# Patient Record
Sex: MALE | Race: WHITE | Employment: OTHER | URBAN - METROPOLITAN AREA
[De-identification: names, ages, dates, MRNs, and addresses within clinical notes are randomized per-mention and may not be internally consistent; named-entity substitution may affect disease eponyms.]

---

## 2017-01-17 PROBLEM — I48.91 ATRIAL FIBRILLATION (HCC): Status: ACTIVE | Noted: 2017-01-17

## 2017-03-20 ENCOUNTER — HOSPITAL ENCOUNTER (OUTPATIENT)
Dept: ULTRASOUND IMAGING | Age: 82
Discharge: HOME | End: 2017-03-20
Attending: UROLOGY
Payer: MEDICARE

## 2017-03-20 DIAGNOSIS — N28.1 RENAL CYST: ICD-10-CM

## 2017-03-20 PROCEDURE — 76770 US EXAM ABDO BACK WALL COMP: CPT

## 2017-03-21 NOTE — PROGRESS NOTES
Patient pre-assessment complete for Generator change with Dr Joseluis Solis scheduled for 3/21/17 at 9:30am, arrival time 7:30am. Patient verified using . Patient instructed to bring all home medications in labeled bottles on the day of procedure. NPO status reinforced. Patient instructed to HOLD HCTZ. Instructed they can take all other medications excluding vitamins & supplements. Patient verbalizes understanding of all instructions & denies any questions at this time.

## 2017-03-22 ENCOUNTER — HOSPITAL ENCOUNTER (OUTPATIENT)
Dept: CARDIAC CATH/INVASIVE PROCEDURES | Age: 82
Discharge: HOME | End: 2017-03-22
Attending: INTERNAL MEDICINE | Admitting: INTERNAL MEDICINE
Payer: MEDICARE

## 2017-03-22 VITALS
TEMPERATURE: 97.5 F | HEART RATE: 69 BPM | OXYGEN SATURATION: 95 % | HEIGHT: 71 IN | DIASTOLIC BLOOD PRESSURE: 76 MMHG | BODY MASS INDEX: 26.6 KG/M2 | SYSTOLIC BLOOD PRESSURE: 137 MMHG | WEIGHT: 190 LBS | RESPIRATION RATE: 15 BRPM

## 2017-03-22 LAB
ANION GAP BLD CALC-SCNC: 7 MMOL/L (ref 7–16)
BUN SERPL-MCNC: 25 MG/DL (ref 8–23)
CALCIUM SERPL-MCNC: 9.1 MG/DL (ref 8.3–10.4)
CHLORIDE SERPL-SCNC: 108 MMOL/L (ref 98–107)
CO2 SERPL-SCNC: 30 MMOL/L (ref 21–32)
CREAT SERPL-MCNC: 1.21 MG/DL (ref 0.8–1.5)
ERYTHROCYTE [DISTWIDTH] IN BLOOD BY AUTOMATED COUNT: 14.1 % (ref 11.9–14.6)
GLUCOSE SERPL-MCNC: 105 MG/DL (ref 65–100)
HCT VFR BLD AUTO: 43.4 % (ref 41.1–50.3)
HGB BLD-MCNC: 14.4 G/DL (ref 13.6–17.2)
INR PPP: 1.1 (ref 0.9–1.2)
MCH RBC QN AUTO: 30.8 PG (ref 26.1–32.9)
MCHC RBC AUTO-ENTMCNC: 33.2 G/DL (ref 31.4–35)
MCV RBC AUTO: 92.9 FL (ref 79.6–97.8)
PLATELET # BLD AUTO: 211 K/UL (ref 150–450)
PMV BLD AUTO: 12.4 FL (ref 10.8–14.1)
POTASSIUM SERPL-SCNC: 3.9 MMOL/L (ref 3.5–5.1)
PROTHROMBIN TIME: 11.9 SEC (ref 9.6–12)
RBC # BLD AUTO: 4.67 M/UL (ref 4.23–5.67)
SODIUM SERPL-SCNC: 145 MMOL/L (ref 136–145)
WBC # BLD AUTO: 6.3 K/UL (ref 4.3–11.1)

## 2017-03-22 PROCEDURE — 33213 INSERT PULSE GEN DUAL LEADS: CPT

## 2017-03-22 PROCEDURE — 74011000258 HC RX REV CODE- 258: Performed by: INTERNAL MEDICINE

## 2017-03-22 PROCEDURE — 74011250636 HC RX REV CODE- 250/636: Performed by: INTERNAL MEDICINE

## 2017-03-22 PROCEDURE — 33228 REMV&REPLC PM GEN DUAL LEAD: CPT

## 2017-03-22 PROCEDURE — 74011250636 HC RX REV CODE- 250/636

## 2017-03-22 PROCEDURE — 77030031139 HC SUT VCRL2 J&J -A

## 2017-03-22 PROCEDURE — 99153 MOD SED SAME PHYS/QHP EA: CPT

## 2017-03-22 PROCEDURE — 99152 MOD SED SAME PHYS/QHP 5/>YRS: CPT

## 2017-03-22 PROCEDURE — 80048 BASIC METABOLIC PNL TOTAL CA: CPT | Performed by: INTERNAL MEDICINE

## 2017-03-22 PROCEDURE — 85027 COMPLETE CBC AUTOMATED: CPT | Performed by: INTERNAL MEDICINE

## 2017-03-22 PROCEDURE — 85610 PROTHROMBIN TIME: CPT | Performed by: INTERNAL MEDICINE

## 2017-03-22 PROCEDURE — 74011000250 HC RX REV CODE- 250: Performed by: INTERNAL MEDICINE

## 2017-03-22 PROCEDURE — C1785 PMKR, DUAL, RATE-RESP: HCPCS

## 2017-03-22 RX ORDER — SODIUM CHLORIDE 9 MG/ML
75 INJECTION, SOLUTION INTRAVENOUS CONTINUOUS
Status: DISCONTINUED | OUTPATIENT
Start: 2017-03-22 | End: 2017-03-22 | Stop reason: HOSPADM

## 2017-03-22 RX ORDER — SODIUM CHLORIDE 0.9 % (FLUSH) 0.9 %
5-10 SYRINGE (ML) INJECTION AS NEEDED
Status: DISCONTINUED | OUTPATIENT
Start: 2017-03-22 | End: 2017-03-22 | Stop reason: HOSPADM

## 2017-03-22 RX ORDER — MIDAZOLAM HYDROCHLORIDE 1 MG/ML
.5-5 INJECTION, SOLUTION INTRAMUSCULAR; INTRAVENOUS
Status: DISCONTINUED | OUTPATIENT
Start: 2017-03-22 | End: 2017-03-22 | Stop reason: HOSPADM

## 2017-03-22 RX ORDER — CEFAZOLIN SODIUM IN 0.9 % NACL 2 G/50 ML
2 INTRAVENOUS SOLUTION, PIGGYBACK (ML) INTRAVENOUS
Status: COMPLETED | OUTPATIENT
Start: 2017-03-22 | End: 2017-03-22

## 2017-03-22 RX ORDER — SODIUM CHLORIDE 0.9 % (FLUSH) 0.9 %
5-10 SYRINGE (ML) INJECTION EVERY 8 HOURS
Status: DISCONTINUED | OUTPATIENT
Start: 2017-03-22 | End: 2017-03-22 | Stop reason: HOSPADM

## 2017-03-22 RX ADMIN — SODIUM CHLORIDE 75 ML/HR: 900 INJECTION, SOLUTION INTRAVENOUS at 08:00

## 2017-03-22 RX ADMIN — MIDAZOLAM HYDROCHLORIDE 2 MG: 1 INJECTION, SOLUTION INTRAMUSCULAR; INTRAVENOUS at 10:03

## 2017-03-22 RX ADMIN — SODIUM CHLORIDE 50000 UNITS: 900 INJECTION, SOLUTION INTRAVENOUS at 08:00

## 2017-03-22 RX ADMIN — CEFAZOLIN 2 G: 1 INJECTION, POWDER, FOR SOLUTION INTRAMUSCULAR; INTRAVENOUS; PARENTERAL at 09:30

## 2017-03-22 NOTE — IP AVS SNAPSHOT
Moon Rodriguez 
 
 
 [facility] [address] [facility] 
[phone] Patient: Adilia Gusman MRN: [id number] SWA:[birth year] Discharge Summary 3/22/2017 Adilia Gusman MRN[de-identified]  [id number] Admission Information Provider Pager Service Admission Date Expected D/C Date Reilly Bolanos MD  CARDIAC CATH LAB 3/22/2017 3/22/2017 Actual LOS Patient Class 0 days OUTPATIENT Follow-up Information Follow up With Details Comments Contact Info Reilly Bolanos MD On 4/4/2017 at 11:30 a.m. in the [place] office , For wound re-check [address]  [address] [facility] [address] [phone] Current Discharge Medication List  
  
ASK your doctor about these medications Dose & Instructions Dispensing Information Comments Morning Noon Evening Bedtime  
 aspirin delayed-release 81 mg tablet Your last dose was: Your next dose is:    
   
   
 Dose:  81 mg Take 81 mg by mouth every evening. Pt states that he will remain on Aspirin 81 mg -- Hx of afib-- not contraindicated-- Favio states this is \"OK\" per Dr Ida Rubin   1/21/16 Refills:  0 CENTRUM SILVER 500-250 mcg Chew Generic drug:  MV-Min-Folic Acid-Lutein Your last dose was: Your next dose is:    
   
   
 Dose:  1 Tab Take 1 Tab by mouth nightly. Refills:  0  
     
   
   
   
  
 COUMADIN 5 mg tablet Generic drug:  warfarin Your last dose was: Your next dose is:    
   
   
 Dose:  2.5-5 mg Take 2.5-5 mg by mouth nightly. 5mg on Sat ,2.5mg on all other days Refills:  0  
     
   
   
   
  
 cpap machine kit Your last dose was: Your next dose is:    
   
   
 by Does Not Apply route. Refills:  0  
     
   
   
   
  
 diltiazem hcl 180 mg Tb24 Your last dose was: Your next dose is:    
   
   
 Dose:  1 Tab Take 1 Tab by mouth two (2) times a day. Refills:  0  
     
   
   
   
  
 hydroCHLOROthiazide 25 mg tablet Commonly known as:  HYDRODIURIL Your last dose was: Your next dose is:    
   
   
 Dose:  12.5 mg Take 0.5 Tabs by mouth daily. 25 mg tab- take 1/2 tab daily Quantity:  90 Tab Refills:  3  
     
   
   
   
  
 levothyroxine 25 mcg tablet Commonly known as:  SYNTHROID Your last dose was: Your next dose is:    
   
   
 Dose:  25 mcg Take 25 mcg by mouth daily (before breakfast). Refills:  0  
     
   
   
   
  
 lisinopril 40 mg tablet Commonly known as:  Mollie Ripa Your last dose was: Your next dose is:    
   
   
 Dose:  40 mg Take 1 Tab by mouth two (2) times a day. Quantity:  180 Tab Refills:  3  
     
   
   
   
  
 sotalol 160 mg tablet Commonly known as:  Ekaterina Sniff Your last dose was: Your next dose is:    
   
   
 Dose:  160 mg Take 1 Tab by mouth two (2) times a day. Quantity:  180 Tab Refills:  3  
     
   
   
   
  
 tamsulosin 0.4 mg capsule Commonly known as:  FLOMAX Your last dose was: Your next dose is:    
   
   
 Dose:  0.4 mg Take 1 capsule by mouth daily. Quantity:  90 capsule Refills:  4 General Information Please provide this summary of care documentation to your next provider. Allergies Unspecified:  Lortab [Hydrocodone-acetaminophen]; Percocet [Oxycodone-acetaminophen] Current Immunizations  Reviewed on 12/16/2016 Name Date Influenza Vaccine 10/1/2016 Pneumococcal Conjugate (PCV-13) 1/1/2015 Discharge Instructions Discharge Instructions PACEMAKER INSTRUCTIONS SHEET 
· Keep your incision dry. · Remove the dressing in 72 hours and redress with sterile gauze and tegaderm.  
· You may use your pacemaker arm; but DO NOT raise the arm higher than your shoulder for the first two weeks to prevent the pacemaker lead from moving. DO NOT immobilize your pacemaker arm. · Call us IMMEDIATELY if you develop fever, pain, redness, and/or drainage at the pacemaker arm. · Do not lift more than 10 pounds for 2 weeks and 20 pounds for 1 month. · Microwaves WILL NOT harm your pacemaker. Warning does not apply to you. · Avoid activities which can reprogram your pacemaker such as arc welding, ham radios, and tanning booths. At airports, always show your pacemaker identification card. You may walk through the metal detector, but do not allow the hand held wand near your pacemaker. Do not have a MRI or NMR scan without talking to your cardiologist. 
· Your pacemakers function will be evaluated over the telephone. Within 3 weeks you should receive a schedule. If you do not receive a schedule, or if you have questions, you may call [phone]. · Remove the battery from the transmitter after each use. Always keep a spare 9 volt battery. · The pacemaker battery is tested by the heart rate with the magnet applied. This test is done each time you transmit your ECG so it is very important that you follow your schedule. · Carry your pacemaker identification card at all times. Within 6 weeks, you should receive your permanent card. Keep your temporary card as a spare. Call [phone] if you do not receive your card or if you lose your card. · Always show the doctor or dentist your pacemaker identification card. · If you have questions about your pacemaker or office appointment, please call the office of [facility] at [phone]. · Following the pacemaker implant, you will need to return to the clinic to see your doctor within 1 week. If you did not receive an appointment, please call [phone]. Discharge Orders None  
  
` Patient Signature:  ____________________________________________________________ Date:  ____________________________________________________________  
  
 [employer] Provider Signature:  ____________________________________________________________ Date:  ____________________________________________________________

## 2017-03-22 NOTE — PROGRESS NOTES
TRANSFER - OUT REPORT:    Verbal report given to RN(name) on Herman Simpson  being transferred to CPRU(unit) for routine progression of care       Report consisted of patients Situation, Background, Assessment and   Recommendations(SBAR). Information from the following report(s) Procedure Summary was reviewed with the receiving nurse. Lines:   Peripheral IV 03/22/17 Left Antecubital (Active)        Opportunity for questions and clarification was provided.       Patient transported with:   Registered Nurse     Generator change completed by Dr Ramirez Spar   Incision left subclavian  St Max device   2 mg versed   Closed with staples, telfa, and tegaderm

## 2017-03-22 NOTE — PROCEDURES
Goyo Payne 44       Name:  Nitesh Wood   MR#:  [id number]   :  1935   Account #:  [de-identified]   Date of Adm:  2017       DATE OF PROCEDURE: 2017      PROCEDURE PERFORMED: Pulse generator exchange. PREOPERATIVE DIAGNOSIS: Sick sinus syndrome with end of life   pacemaker. POSTOPERATIVE DIAGNOSIS: Sick sinus syndrome with end of life   pacemaker status post pulse generator exchange. BRIEF CLINICAL ABSTRACT: The patient is an 78-year-old    male with history of paroxysmal atrial flutter, sick sinus   syndrome with previous dual-chamber pacemaker and coronary   artery disease. The patient was recently evaluated in device   clinic and found to have reached end of life pulse generator. Elective pulse generator exchange was recommended. Coumadin   therapy has been interrupted prior to the procedure. Risks,   complications were explained. The patient is being admitted for   an outpatient procedure. PROCEDURE: After informed consent was obtained, the patient was   transported to the pacing laboratory in stable condition. He   received Versed 2 mg IV during the procedure for additional   sedation. Appropriate conscious sedation protocol was adhered   to. Conscious sedation was supervised for 19 minutes. There were   no complications during the conscious sedation protocol. The   left subclavian area was prepped and draped in sterile fashion. The patient received Ancef 2 mg IV for prophylactic antibiotic   therapy prior to the procedure. With 1% Xylocaine, skin and   subcutaneous tissue were anesthetized in the left subclavian   region over the existing pulse generator. With sharp dissection,   the pacemaker pocket was surgically opened. Dissection was   performed over the pulse generator. The old pulse generator was   explanted without complications.  The explanted device is a [employer] [place] XL DR dual-chamber pulse generator, model   number 5826. Serial number is [id number]. Date of implant was   01/19/2009. The leads were connected to a new [address] [place] [address]   dual-chamber pulse generator. This is a [employer]. [employer]   Assurity MRI compatible device. Model number is [id number], serial   number is [id number]. The leads were then interrogated through the   device. The right ventricular lead is a [employer]. [employer], model   number 1688TC 52 cm active fixation bipolar lead. Serial number   is [id number]. Date of implant was 01/19/2009. The chronic right   ventricular threshold is 1.5 volts at 0.1 milliseconds with   sensed R-waves of 9.4 millivolts with a lead impedance of 350   ohms. The right atrial lead is also a [employer]. [employer] Medical active   fixation bipolar lead, model number 1782TC. It is a 46 cm active   fixation lead, serial number is [id number]. The chronic right atrial   threshold is 0.75 volts at 0.4 milliseconds with sensed P waves   of 3.8 millivolts with a lead impedance of 290 ohms. This   was thought to represent acceptable chronic pacing parameters. Following lead connection to the new pulse generator, the   pacemaker pocket was irrigated with antibiotic solution. Adequate hemostasis was noted. The leads and generator were then   placed within the pocket. The subcutaneous tissues were closed   with 3-0 absorbable Vicryl. The skin was then closed with 13   surgical staples. Estimated blood loss was less than 5 mL. There   were no noted complications. The patient was transported to the   holding area in stable condition. He will be monitored for   observation and hopefully released home later today with   appropriate wound precautions. COMPLICATIONS: None. ESTIMATED BLOOD LOSS: Less than 10 mL. DISPOSITION: The patient will be monitored in recovery and   hopefully released to home with outpatient followup and   appropriate wound precautions.         MD DEIDRE Negrete / Salma Adan   D:  03/22/2017   10:39   T:  03/22/2017 13:05   Job #:  [id number]

## 2017-03-22 NOTE — BRIEF OP NOTE
Brief Cardiac Procedure Note    Patient: Swati German MRN: [id number]  SSN: [id number]    YOB: 1935  Age: 80 y.o. Sex: male      Date of Procedure: 3/22/2017     Pre-procedure Diagnosis: SSS with EOL pacemaker    Post-procedure Diagnosis: SSS s/p pacemaker exchange  Procedure: Pulse generator exchange    Brief Description of Procedure: Pacemaker exhange    Performed By: Lore Simeon MD     Assistants: none    Anesthesia: Verised 2 mg iv. Estimated Blood Loss: Less than 10 mL      Specimens: ST Max Chana D/E:[id number]. Implants: sT Max Assurity pulse generator. I/X:[id number]. Findings: End of life pacemaker    Complications: none. Recommendations: Continue medical therapy. Wound precautions.     Signed By: Lore Simeon MD     March 22, 2017

## 2017-03-22 NOTE — DISCHARGE INSTRUCTIONS
PACEMAKER INSTRUCTIONS SHEET  · Keep your incision dry. · Remove the dressing in 72 hours and redress with sterile gauze and tegaderm. · You may use your pacemaker arm; but DO NOT raise the arm higher than your shoulder for the first two weeks to prevent the pacemaker lead from moving. DO NOT immobilize your pacemaker arm. · Call us IMMEDIATELY if you develop fever, pain, redness, and/or drainage at the pacemaker arm. · Do not lift more than 10 pounds for 2 weeks and 20 pounds for 1 month. · Microwaves WILL NOT harm your pacemaker. Warning does not apply to you. · Avoid activities which can reprogram your pacemaker such as arc welding, ham radios, and tanning booths. At airports, always show your pacemaker identification card. You may walk through the metal detector, but do not allow the hand held wand near your pacemaker. Do not have a MRI or NMR scan without talking to your cardiologist.  · Your pacemakers function will be evaluated over the telephone. Within 3 weeks you should receive a schedule. If you do not receive a schedule, or if you have questions, you may call [phone]. · Remove the battery from the transmitter after each use. Always keep a spare 9 volt battery. · The pacemaker battery is tested by the heart rate with the magnet applied. This test is done each time you transmit your ECG so it is very important that you follow your schedule. · Carry your pacemaker identification card at all times. Within 6 weeks, you should receive your permanent card. Keep your temporary card as a spare. Call [phone] if you do not receive your card or if you lose your card. · Always show the doctor or dentist your pacemaker identification card. · If you have questions about your pacemaker or office appointment, please call the office of [facility] at [phone]. · Following the pacemaker implant, you will need to return to the clinic to see your doctor within 1 week.  If you did not receive an appointment, please call [phone].

## 2017-03-22 NOTE — PROGRESS NOTES
Discharged to home accompanied by wife. Left unit via wheelchair. Pacemaker dressing dry and intact, no bleeding or hematoma.

## 2017-03-22 NOTE — PROGRESS NOTES
Discharge instructions reviewed with patient and wife. Verbalize understanding. Written instructions given.

## 2017-03-22 NOTE — PROGRESS NOTES
Report received from Cheli Phoenix 15. Procedural findings communicated. Intra procedural  medication administration reviewed. Progression of care discussed.      Patient received into [address] [facility] 2 post generator change  Dressing dry and intact yes    Patient instructed to limit movement to left upper extremity    Routine post procedural vital signs and site assessment initiated yes

## 2017-03-22 NOTE — PROGRESS NOTES
Left pectoral pacemaker site covered with sterile tegaderm, noted to be clean, dry, and intact without bleeding or hematoma. Sling applied to left arm and patient instructed to limit movement of the arm. Patient verbalizes understanding.

## 2017-12-06 PROBLEM — I49.5 SSS (SICK SINUS SYNDROME) (HCC): Status: ACTIVE | Noted: 2017-12-06

## 2017-12-29 ENCOUNTER — HOSPITAL ENCOUNTER (OUTPATIENT)
Dept: GENERAL RADIOLOGY | Age: 82
Discharge: HOME | End: 2017-12-29
Payer: MEDICARE

## 2017-12-29 DIAGNOSIS — M25.562 BILATERAL KNEE PAIN: ICD-10-CM

## 2017-12-29 DIAGNOSIS — M25.561 BILATERAL KNEE PAIN: ICD-10-CM

## 2017-12-29 PROCEDURE — 73560 X-RAY EXAM OF KNEE 1 OR 2: CPT

## 2018-02-05 PROBLEM — R06.09 DYSPNEA ON EXERTION: Status: ACTIVE | Noted: 2018-02-05

## 2018-03-07 ENCOUNTER — HOSPITAL ENCOUNTER (OUTPATIENT)
Dept: GENERAL RADIOLOGY | Age: 83
Discharge: HOME | End: 2018-03-07
Payer: MEDICARE

## 2018-03-07 DIAGNOSIS — R06.02 BREATH SHORTNESS: ICD-10-CM

## 2018-03-07 PROCEDURE — 71046 X-RAY EXAM CHEST 2 VIEWS: CPT

## 2018-05-18 ENCOUNTER — HOSPITAL ENCOUNTER (OUTPATIENT)
Dept: CT IMAGING | Age: 83
Discharge: HOME | End: 2018-05-18
Attending: INTERNAL MEDICINE
Payer: MEDICARE

## 2018-05-18 DIAGNOSIS — R41.82 ALTERED MENTAL STATUS: ICD-10-CM

## 2018-05-18 PROCEDURE — 70450 CT HEAD/BRAIN W/O DYE: CPT

## 2018-07-18 ENCOUNTER — HOSPITAL ENCOUNTER (OUTPATIENT)
Dept: ULTRASOUND IMAGING | Age: 83
Discharge: HOME | End: 2018-07-18
Attending: UROLOGY
Payer: MEDICARE

## 2018-07-18 DIAGNOSIS — N28.1 RENAL CYST: ICD-10-CM

## 2018-07-18 PROCEDURE — 76770 US EXAM ABDO BACK WALL COMP: CPT

## 2019-02-25 ENCOUNTER — HOSPITAL ENCOUNTER (OUTPATIENT)
Dept: GENERAL RADIOLOGY | Age: 84
Discharge: HOME | End: 2019-02-25
Payer: MEDICARE

## 2019-02-25 ENCOUNTER — HOSPITAL ENCOUNTER (OUTPATIENT)
Dept: LAB | Age: 84
Discharge: HOME | End: 2019-02-25
Payer: MEDICARE

## 2019-02-25 DIAGNOSIS — R06.09 DYSPNEA ON EXERTION: ICD-10-CM

## 2019-02-25 DIAGNOSIS — I48.0 PAROXYSMAL ATRIAL FIBRILLATION (HCC): ICD-10-CM

## 2019-02-25 DIAGNOSIS — I10 ESSENTIAL HYPERTENSION: Chronic | ICD-10-CM

## 2019-02-25 LAB
ANION GAP SERPL CALC-SCNC: 8 MMOL/L
BASOPHILS # BLD: 0 K/UL (ref 0–0.2)
BASOPHILS NFR BLD: 0 % (ref 0–2)
BNP SERPL-MCNC: 333 PG/ML
BUN SERPL-MCNC: 53 MG/DL (ref 8–23)
CALCIUM SERPL-MCNC: 8.7 MG/DL (ref 8.3–10.4)
CHLORIDE SERPL-SCNC: 106 MMOL/L (ref 98–107)
CO2 SERPL-SCNC: 24 MMOL/L (ref 21–32)
CREAT SERPL-MCNC: 1.3 MG/DL (ref 0.8–1.5)
DIFFERENTIAL METHOD BLD: ABNORMAL
EOSINOPHIL # BLD: 0 K/UL (ref 0–0.8)
EOSINOPHIL NFR BLD: 0 % (ref 0.5–7.8)
ERYTHROCYTE [DISTWIDTH] IN BLOOD BY AUTOMATED COUNT: 14.5 % (ref 11.9–14.6)
GLUCOSE SERPL-MCNC: 131 MG/DL (ref 65–100)
HCT VFR BLD AUTO: 46.2 % (ref 41.1–50.3)
HGB BLD-MCNC: 15.5 G/DL (ref 13.6–17.2)
IMM GRANULOCYTES # BLD AUTO: 0.1 K/UL (ref 0–0.5)
IMM GRANULOCYTES NFR BLD AUTO: 1 % (ref 0–5)
LYMPHOCYTES # BLD: 1.6 K/UL (ref 0.5–4.6)
LYMPHOCYTES NFR BLD: 10 % (ref 13–44)
MCH RBC QN AUTO: 31.1 PG (ref 26.1–32.9)
MCHC RBC AUTO-ENTMCNC: 33.5 G/DL (ref 31.4–35)
MCV RBC AUTO: 92.8 FL (ref 79.6–97.8)
MONOCYTES # BLD: 1.1 K/UL (ref 0.1–1.3)
MONOCYTES NFR BLD: 7 % (ref 4–12)
NEUTS SEG # BLD: 12.6 K/UL (ref 1.7–8.2)
NEUTS SEG NFR BLD: 81 % (ref 43–78)
NRBC # BLD: 0 K/UL (ref 0–0.2)
PLATELET # BLD AUTO: 226 K/UL (ref 150–450)
PMV BLD AUTO: 11.1 FL (ref 9.4–12.3)
POTASSIUM SERPL-SCNC: 4.4 MMOL/L (ref 3.5–5.1)
RBC # BLD AUTO: 4.98 M/UL (ref 4.23–5.6)
SODIUM SERPL-SCNC: 138 MMOL/L (ref 136–145)
TSH SERPL DL<=0.005 MIU/L-ACNC: 1.96 UIU/ML (ref 0.36–3.74)
WBC # BLD AUTO: 15.5 K/UL (ref 4.3–11.1)

## 2019-02-25 PROCEDURE — 36415 COLL VENOUS BLD VENIPUNCTURE: CPT

## 2019-02-25 PROCEDURE — 85025 COMPLETE CBC W/AUTO DIFF WBC: CPT

## 2019-02-25 PROCEDURE — 80048 BASIC METABOLIC PNL TOTAL CA: CPT

## 2019-02-25 PROCEDURE — 71046 X-RAY EXAM CHEST 2 VIEWS: CPT

## 2019-02-25 PROCEDURE — 83880 ASSAY OF NATRIURETIC PEPTIDE: CPT

## 2019-02-25 PROCEDURE — 84443 ASSAY THYROID STIM HORMONE: CPT

## 2019-08-16 ENCOUNTER — HOSPITAL ENCOUNTER (OUTPATIENT)
Dept: ULTRASOUND IMAGING | Age: 84
Discharge: HOME | End: 2019-08-16
Attending: INTERNAL MEDICINE
Payer: MEDICARE

## 2019-08-16 ENCOUNTER — HOSPITAL ENCOUNTER (OUTPATIENT)
Dept: ULTRASOUND IMAGING | Age: 84
Discharge: HOME | End: 2019-08-16
Attending: UROLOGY
Payer: MEDICARE

## 2019-08-16 DIAGNOSIS — R60.0 LOWER EXTREMITY EDEMA: ICD-10-CM

## 2019-08-16 PROCEDURE — 76770 US EXAM ABDO BACK WALL COMP: CPT

## 2019-08-16 PROCEDURE — 93970 EXTREMITY STUDY: CPT

## 2019-08-20 ENCOUNTER — HOSPITAL ENCOUNTER (OUTPATIENT)
Dept: ULTRASOUND IMAGING | Age: 84
Discharge: HOME | End: 2019-08-20
Attending: UROLOGY

## 2019-08-20 DIAGNOSIS — N52.9 IMPOTENCE OF ORGANIC ORIGIN: Chronic | ICD-10-CM

## 2019-08-20 DIAGNOSIS — R35.0 URINARY FREQUENCY: Chronic | ICD-10-CM

## 2019-08-20 DIAGNOSIS — N40.1 BENIGN PROSTATIC HYPERPLASIA WITH LOWER URINARY TRACT SYMPTOMS, SYMPTOM DETAILS UNSPECIFIED: ICD-10-CM

## 2019-08-20 DIAGNOSIS — N28.1 RENAL CYST: ICD-10-CM

## 2019-09-24 ENCOUNTER — HOSPITAL ENCOUNTER (OUTPATIENT)
Dept: PET IMAGING | Age: 84
Discharge: HOME | End: 2019-09-24
Payer: MEDICARE

## 2019-09-24 DIAGNOSIS — R91.1 LUNG NODULE: ICD-10-CM

## 2019-09-24 PROCEDURE — A9552 F18 FDG: HCPCS

## 2019-09-24 PROCEDURE — 74011636320 HC RX REV CODE- 636/320: Performed by: INTERNAL MEDICINE

## 2019-09-24 RX ORDER — SODIUM CHLORIDE 0.9 % (FLUSH) 0.9 %
10 SYRINGE (ML) INJECTION
Status: COMPLETED | OUTPATIENT
Start: 2019-09-24 | End: 2019-09-24

## 2019-09-24 RX ADMIN — Medication 10 ML: at 14:45

## 2019-09-24 RX ADMIN — DIATRIZOATE MEGLUMINE AND DIATRIZOATE SODIUM 10 ML: 660; 100 LIQUID ORAL; RECTAL at 14:45

## 2019-10-21 PROBLEM — E04.1 THYROID NODULE: Status: ACTIVE | Noted: 2019-10-21

## 2019-12-20 ENCOUNTER — HOSPITAL ENCOUNTER (OUTPATIENT)
Dept: CT IMAGING | Age: 84
Discharge: HOME | End: 2019-12-20
Attending: INTERNAL MEDICINE
Payer: MEDICARE

## 2019-12-20 DIAGNOSIS — R91.8 PULMONARY NODULES: ICD-10-CM

## 2019-12-20 PROCEDURE — 71250 CT THORAX DX C-: CPT

## 2019-12-23 NOTE — PROGRESS NOTES
Spoke with the patient in regards to their CT scan results, explained to the patient per Dr. Chelsea Lemus that the CT has showed that the previously seen largest nodule in the left upper lobe is no longer visible and that the only residual nodule is a small 2 mm nodule for which we can repeat a CT scan at 18 months and we can see him back in the office in 1 year to check on his symptoms and order a CT of the Neck at that time. Patient understood the results and did not have any further questions. CT order will be established for 18 months. Micheal Gan, can we put this patient on the recall list for 1 year with Dr. Chelsea Lemus? Thanks!   // Kim ONEILL

## 2019-12-31 PROBLEM — K21.00 REFLUX ESOPHAGITIS: Status: ACTIVE | Noted: 2018-02-23

## 2019-12-31 PROBLEM — R49.0 HOARSENESS: Status: ACTIVE | Noted: 2018-02-23

## 2020-01-24 DIAGNOSIS — E04.1 THYROID NODULE: Primary | ICD-10-CM

## 2020-01-27 ENCOUNTER — HOSPITAL ENCOUNTER (OUTPATIENT)
Dept: SURGERY | Age: 85
Discharge: HOME | End: 2020-01-27

## 2020-01-28 VITALS — WEIGHT: 195 LBS | BODY MASS INDEX: 27.3 KG/M2 | HEIGHT: 71 IN

## 2020-01-28 RX ORDER — ESOMEPRAZOLE MAGNESIUM 40 MG/1
40 CAPSULE, DELAYED RELEASE ORAL DAILY
COMMUNITY
End: 2020-08-13 | Stop reason: ALTCHOICE

## 2020-01-28 NOTE — PERIOP NOTES
[facility] repArian Fell, returned call and stated rep not required DOS and could use magnet if needed.

## 2020-01-28 NOTE — PERIOP NOTES
Patient verified name and . Order for consent was found in EHR and matches case posting; patient verifies procedure. Type 2 surgery, PAT phone assessment complete. Orders not received. Labs per surgeon: None  Labs per anesthesia protocol: Hgb, Potassium, Creatinine  Patient's wife instructed to go to [facility] tomorrow to have patient's blood drawn [phone]. Verbalized understanding and states will go after MD appointment. Patient has [facility] [place] Pacemaker due to Afib. Left message for Dalia Bernstein rep for [address] [place] [address] [phone], to return call and advise on up coming surgery and if rep assistance needed. Spoke with June Lima in scheduling and had Pacemaker added to case posting. Last cardiac office note 2019, last EKG tracing 2019, last stress test report 2019, last echo report 2019 (LVEF 65%) and last pacemaker program check 2019 all found in Chart review for anesthesia reference. Per Dr. Yi Jimenez instruction (found in Chart review):  2020   Gabbie Newton MD   to Dorathy Coad 1:07 PM   OK to hold coumadin for surgery, would hold as few days as possible     Instructed patient's wife, patient needs to start ASA 81mg when Coumadin stopped. Verbalized understanding. Pt's wife states patient's last dose will be 2019. Patient answered medical/surgical history questions at their best of ability. All prior to admission medications documented in [facility]. Patient instructed to take the following medications the day of surgery according to anesthesia guidelines with a small sip of water: ASA 81mg, Dilt-XR, hydralazine, levothyroxine, metoprolol, tamsulosin esomeprazole and use inhaler and bring to hospital. Also instructed to bring CPAP to hospital DOS . Hold all vitamins 7 days prior to surgery and NSAIDS 5 days prior to surgery.  Prescription meds to hold:Coumadin per Dr. Ada Hawkins and Dr. Yi Jimenez instruction    Patient instructed on the following:  Arrive at A Entrance, time of arrival to be called the day before by 1700  NPO after midnight including gum, mints, and ice chips  Responsible adult must drive patient to the hospital, stay during surgery, and patient will need supervision 24 hours after anesthesia  Use antibacterial soap in shower the night before surgery and on the morning of surgery  All piercings must be removed prior to arrival.    Leave all valuables (money and jewelry) at home but bring insurance card and ID on       DOS. Do not wear make-up, nail polish, lotions, cologne, perfumes, powders, or oil on skin. Patient teach back successful and patient demonstrates knowledge of instruction.

## 2020-01-29 ENCOUNTER — HOSPITAL ENCOUNTER (OUTPATIENT)
Dept: LAB | Age: 85
Discharge: HOME | End: 2020-01-29
Payer: MEDICARE

## 2020-01-29 LAB
CREAT SERPL-MCNC: 1.31 MG/DL (ref 0.8–1.5)
HGB BLD-MCNC: 12.5 G/DL (ref 13.6–17.2)
POTASSIUM SERPL-SCNC: 3.5 MMOL/L (ref 3.5–5.1)

## 2020-01-29 PROCEDURE — 84132 ASSAY OF SERUM POTASSIUM: CPT

## 2020-01-29 PROCEDURE — 85018 HEMOGLOBIN: CPT

## 2020-01-29 PROCEDURE — 36415 COLL VENOUS BLD VENIPUNCTURE: CPT

## 2020-01-29 PROCEDURE — 82565 ASSAY OF CREATININE: CPT

## 2020-02-02 ENCOUNTER — ANESTHESIA EVENT (OUTPATIENT)
Dept: SURGERY | Age: 85
End: 2020-02-02
Payer: MEDICARE

## 2020-02-03 ENCOUNTER — ANESTHESIA (OUTPATIENT)
Dept: SURGERY | Age: 85
End: 2020-02-03
Payer: MEDICARE

## 2020-02-03 ENCOUNTER — HOSPITAL ENCOUNTER (OUTPATIENT)
Age: 85
Setting detail: OBSERVATION
Discharge: HOME | End: 2020-02-04
Attending: OTOLARYNGOLOGY | Admitting: OTOLARYNGOLOGY
Payer: MEDICARE

## 2020-02-03 DIAGNOSIS — E04.1 THYROID NODULE: ICD-10-CM

## 2020-02-03 PROBLEM — E89.0 S/P TOTAL THYROIDECTOMY: Status: ACTIVE | Noted: 2020-02-03

## 2020-02-03 LAB
CALCIUM SERPL-MCNC: 9.3 MG/DL (ref 8.3–10.4)
PTH-INTACT SERPL-MCNC: 46.5 PG/ML (ref 18.5–88)

## 2020-02-03 PROCEDURE — 77030031753 HC SHR ENDO COAG HARM J&J -E: Performed by: OTOLARYNGOLOGY

## 2020-02-03 PROCEDURE — 77030040361 HC SLV COMPR DVT MDII -B: Performed by: OTOLARYNGOLOGY

## 2020-02-03 PROCEDURE — 76060000035 HC ANESTHESIA 2 TO 2.5 HR: Performed by: OTOLARYNGOLOGY

## 2020-02-03 PROCEDURE — 77030021678 HC GLIDESCP STAT DISP VERT -B: Performed by: ANESTHESIOLOGY

## 2020-02-03 PROCEDURE — 77030002996 HC SUT SLK J&J -A: Performed by: OTOLARYNGOLOGY

## 2020-02-03 PROCEDURE — 74011000250 HC RX REV CODE- 250: Performed by: NURSE ANESTHETIST, CERTIFIED REGISTERED

## 2020-02-03 PROCEDURE — 77030018836 HC SOL IRR NACL ICUM -A: Performed by: OTOLARYNGOLOGY

## 2020-02-03 PROCEDURE — 77030040361 HC SLV COMPR DVT MDII -B

## 2020-02-03 PROCEDURE — 77030006671 HC BLD MYRIN BVR BD -A: Performed by: OTOLARYNGOLOGY

## 2020-02-03 PROCEDURE — 74011000250 HC RX REV CODE- 250: Performed by: OTOLARYNGOLOGY

## 2020-02-03 PROCEDURE — 74011250636 HC RX REV CODE- 250/636: Performed by: ANESTHESIOLOGY

## 2020-02-03 PROCEDURE — 77030019655 HC PRB STIM CRAN MEDT -B: Performed by: OTOLARYNGOLOGY

## 2020-02-03 PROCEDURE — 74011250636 HC RX REV CODE- 250/636: Performed by: NURSE ANESTHETIST, CERTIFIED REGISTERED

## 2020-02-03 PROCEDURE — 77030032988 HC TU ET NIM TRIVNTG EMG MEDT -D: Performed by: OTOLARYNGOLOGY

## 2020-02-03 PROCEDURE — 99218 HC RM OBSERVATION: CPT

## 2020-02-03 PROCEDURE — 82310 ASSAY OF CALCIUM: CPT

## 2020-02-03 PROCEDURE — 77030019908 HC STETH ESOPH SIMS -A: Performed by: ANESTHESIOLOGY

## 2020-02-03 PROCEDURE — 76010000171 HC OR TIME 2 TO 2.5 HR INTENSV-TIER 1: Performed by: OTOLARYNGOLOGY

## 2020-02-03 PROCEDURE — 74011250637 HC RX REV CODE- 250/637: Performed by: OTOLARYNGOLOGY

## 2020-02-03 PROCEDURE — 36415 COLL VENOUS BLD VENIPUNCTURE: CPT

## 2020-02-03 PROCEDURE — 77030011267 HC ELECTRD BLD COVD -A: Performed by: OTOLARYNGOLOGY

## 2020-02-03 PROCEDURE — 77030002888 HC SUT CHRMC J&J -A: Performed by: OTOLARYNGOLOGY

## 2020-02-03 PROCEDURE — 88331 PATH CONSLTJ SURG 1 BLK 1SPC: CPT

## 2020-02-03 PROCEDURE — 76210000000 HC OR PH I REC 2 TO 2.5 HR: Performed by: OTOLARYNGOLOGY

## 2020-02-03 PROCEDURE — 77030040922 HC BLNKT HYPOTHRM STRY -A: Performed by: ANESTHESIOLOGY

## 2020-02-03 PROCEDURE — 77030040356 HC CORD BPLR FRCP COVD -A: Performed by: OTOLARYNGOLOGY

## 2020-02-03 PROCEDURE — 83970 ASSAY OF PARATHORMONE: CPT

## 2020-02-03 PROCEDURE — 77030002916 HC SUT ETHLN J&J -A: Performed by: OTOLARYNGOLOGY

## 2020-02-03 PROCEDURE — 88305 TISSUE EXAM BY PATHOLOGIST: CPT

## 2020-02-03 RX ORDER — MORPHINE SULFATE 2 MG/ML
2 INJECTION, SOLUTION INTRAMUSCULAR; INTRAVENOUS
Status: DISCONTINUED | OUTPATIENT
Start: 2020-02-03 | End: 2020-02-04 | Stop reason: HOSPADM

## 2020-02-03 RX ORDER — FUROSEMIDE 40 MG/1
40 TABLET ORAL 2 TIMES DAILY
Status: DISCONTINUED | OUTPATIENT
Start: 2020-02-03 | End: 2020-02-04 | Stop reason: HOSPADM

## 2020-02-03 RX ORDER — ALBUTEROL SULFATE 0.83 MG/ML
2.5 SOLUTION RESPIRATORY (INHALATION)
Status: DISCONTINUED | OUTPATIENT
Start: 2020-02-03 | End: 2020-02-04 | Stop reason: HOSPADM

## 2020-02-03 RX ORDER — SUCCINYLCHOLINE CHLORIDE 20 MG/ML
INJECTION INTRAMUSCULAR; INTRAVENOUS AS NEEDED
Status: DISCONTINUED | OUTPATIENT
Start: 2020-02-03 | End: 2020-02-03 | Stop reason: HOSPADM

## 2020-02-03 RX ORDER — AMOXICILLIN 500 MG/1
500 CAPSULE ORAL EVERY 12 HOURS
Status: DISCONTINUED | OUTPATIENT
Start: 2020-02-03 | End: 2020-02-04 | Stop reason: HOSPADM

## 2020-02-03 RX ORDER — LABETALOL HYDROCHLORIDE 5 MG/ML
20 INJECTION, SOLUTION INTRAVENOUS
Status: DISCONTINUED | OUTPATIENT
Start: 2020-02-03 | End: 2020-02-04 | Stop reason: HOSPADM

## 2020-02-03 RX ORDER — ONDANSETRON 4 MG/1
4 TABLET, ORALLY DISINTEGRATING ORAL
Status: DISCONTINUED | OUTPATIENT
Start: 2020-02-03 | End: 2020-02-04 | Stop reason: HOSPADM

## 2020-02-03 RX ORDER — PROPOFOL 10 MG/ML
INJECTION, EMULSION INTRAVENOUS AS NEEDED
Status: DISCONTINUED | OUTPATIENT
Start: 2020-02-03 | End: 2020-02-03 | Stop reason: HOSPADM

## 2020-02-03 RX ORDER — SODIUM CHLORIDE, SODIUM LACTATE, POTASSIUM CHLORIDE, CALCIUM CHLORIDE 600; 310; 30; 20 MG/100ML; MG/100ML; MG/100ML; MG/100ML
150 INJECTION, SOLUTION INTRAVENOUS CONTINUOUS
Status: DISCONTINUED | OUTPATIENT
Start: 2020-02-03 | End: 2020-02-03

## 2020-02-03 RX ORDER — SODIUM CHLORIDE, SODIUM LACTATE, POTASSIUM CHLORIDE, CALCIUM CHLORIDE 600; 310; 30; 20 MG/100ML; MG/100ML; MG/100ML; MG/100ML
150 INJECTION, SOLUTION INTRAVENOUS CONTINUOUS
Status: DISCONTINUED | OUTPATIENT
Start: 2020-02-03 | End: 2020-02-03 | Stop reason: HOSPADM

## 2020-02-03 RX ORDER — LIDOCAINE HYDROCHLORIDE AND EPINEPHRINE 10; 10 MG/ML; UG/ML
INJECTION, SOLUTION INFILTRATION; PERINEURAL AS NEEDED
Status: DISCONTINUED | OUTPATIENT
Start: 2020-02-03 | End: 2020-02-03 | Stop reason: HOSPADM

## 2020-02-03 RX ORDER — ACETAMINOPHEN 500 MG
500 TABLET ORAL
Status: DISCONTINUED | OUTPATIENT
Start: 2020-02-03 | End: 2020-02-04 | Stop reason: HOSPADM

## 2020-02-03 RX ORDER — ONDANSETRON 2 MG/ML
INJECTION INTRAMUSCULAR; INTRAVENOUS AS NEEDED
Status: DISCONTINUED | OUTPATIENT
Start: 2020-02-03 | End: 2020-02-03 | Stop reason: HOSPADM

## 2020-02-03 RX ORDER — ALBUTEROL SULFATE 0.83 MG/ML
2.5 SOLUTION RESPIRATORY (INHALATION)
Status: DISCONTINUED | OUTPATIENT
Start: 2020-02-03 | End: 2020-02-03

## 2020-02-03 RX ORDER — ACETAMINOPHEN 500 MG
1000 TABLET ORAL
Status: DISCONTINUED | OUTPATIENT
Start: 2020-02-03 | End: 2020-02-03

## 2020-02-03 RX ORDER — HYDROMORPHONE HYDROCHLORIDE 2 MG/ML
0.5 INJECTION, SOLUTION INTRAMUSCULAR; INTRAVENOUS; SUBCUTANEOUS
Status: DISCONTINUED | OUTPATIENT
Start: 2020-02-03 | End: 2020-02-03

## 2020-02-03 RX ORDER — NALOXONE HYDROCHLORIDE 0.4 MG/ML
0.4 INJECTION, SOLUTION INTRAMUSCULAR; INTRAVENOUS; SUBCUTANEOUS AS NEEDED
Status: DISCONTINUED | OUTPATIENT
Start: 2020-02-03 | End: 2020-02-04 | Stop reason: HOSPADM

## 2020-02-03 RX ORDER — PANTOPRAZOLE SODIUM 40 MG/1
40 TABLET, DELAYED RELEASE ORAL
Status: DISCONTINUED | OUTPATIENT
Start: 2020-02-04 | End: 2020-02-04 | Stop reason: HOSPADM

## 2020-02-03 RX ORDER — ROCURONIUM BROMIDE 10 MG/ML
INJECTION, SOLUTION INTRAVENOUS AS NEEDED
Status: DISCONTINUED | OUTPATIENT
Start: 2020-02-03 | End: 2020-02-03 | Stop reason: HOSPADM

## 2020-02-03 RX ORDER — SODIUM CHLORIDE 9 MG/ML
50 INJECTION, SOLUTION INTRAVENOUS CONTINUOUS
Status: DISCONTINUED | OUTPATIENT
Start: 2020-02-03 | End: 2020-02-03

## 2020-02-03 RX ORDER — EPHEDRINE SULFATE/0.9% NACL/PF 50 MG/5 ML
SYRINGE (ML) INTRAVENOUS AS NEEDED
Status: DISCONTINUED | OUTPATIENT
Start: 2020-02-03 | End: 2020-02-03

## 2020-02-03 RX ORDER — FAMOTIDINE 20 MG/1
20 TABLET, FILM COATED ORAL ONCE
Status: DISCONTINUED | OUTPATIENT
Start: 2020-02-03 | End: 2020-02-03 | Stop reason: HOSPADM

## 2020-02-03 RX ORDER — HYDROCODONE BITARTRATE AND ACETAMINOPHEN 5; 325 MG/1; MG/1
1 TABLET ORAL
Status: DISCONTINUED | OUTPATIENT
Start: 2020-02-03 | End: 2020-02-04 | Stop reason: HOSPADM

## 2020-02-03 RX ORDER — LIDOCAINE HYDROCHLORIDE 20 MG/ML
INJECTION, SOLUTION EPIDURAL; INFILTRATION; INTRACAUDAL; PERINEURAL AS NEEDED
Status: DISCONTINUED | OUTPATIENT
Start: 2020-02-03 | End: 2020-02-03 | Stop reason: HOSPADM

## 2020-02-03 RX ORDER — METOPROLOL SUCCINATE 25 MG/1
25 TABLET, EXTENDED RELEASE ORAL DAILY
Status: DISCONTINUED | OUTPATIENT
Start: 2020-02-04 | End: 2020-02-04 | Stop reason: HOSPADM

## 2020-02-03 RX ORDER — SODIUM CHLORIDE, SODIUM LACTATE, POTASSIUM CHLORIDE, CALCIUM CHLORIDE 600; 310; 30; 20 MG/100ML; MG/100ML; MG/100ML; MG/100ML
100 INJECTION, SOLUTION INTRAVENOUS CONTINUOUS
Status: DISCONTINUED | OUTPATIENT
Start: 2020-02-03 | End: 2020-02-04 | Stop reason: HOSPADM

## 2020-02-03 RX ORDER — DEXAMETHASONE SODIUM PHOSPHATE 4 MG/ML
INJECTION, SOLUTION INTRA-ARTICULAR; INTRALESIONAL; INTRAMUSCULAR; INTRAVENOUS; SOFT TISSUE AS NEEDED
Status: DISCONTINUED | OUTPATIENT
Start: 2020-02-03 | End: 2020-02-03 | Stop reason: HOSPADM

## 2020-02-03 RX ORDER — LISINOPRIL 20 MG/1
40 TABLET ORAL EVERY 12 HOURS
Status: DISCONTINUED | OUTPATIENT
Start: 2020-02-03 | End: 2020-02-04 | Stop reason: HOSPADM

## 2020-02-03 RX ORDER — LIDOCAINE HYDROCHLORIDE 10 MG/ML
0.1 INJECTION INFILTRATION; PERINEURAL AS NEEDED
Status: DISCONTINUED | OUTPATIENT
Start: 2020-02-03 | End: 2020-02-03 | Stop reason: HOSPADM

## 2020-02-03 RX ORDER — DILTIAZEM HYDROCHLORIDE 180 MG/1
180 CAPSULE, COATED, EXTENDED RELEASE ORAL EVERY 12 HOURS
Status: DISCONTINUED | OUTPATIENT
Start: 2020-02-03 | End: 2020-02-04 | Stop reason: HOSPADM

## 2020-02-03 RX ORDER — MUPIROCIN 20 MG/G
OINTMENT TOPICAL AS NEEDED
Status: DISCONTINUED | OUTPATIENT
Start: 2020-02-03 | End: 2020-02-03 | Stop reason: HOSPADM

## 2020-02-03 RX ORDER — METOPROLOL TARTRATE 5 MG/5ML
INJECTION INTRAVENOUS AS NEEDED
Status: DISCONTINUED | OUTPATIENT
Start: 2020-02-03 | End: 2020-02-03 | Stop reason: HOSPADM

## 2020-02-03 RX ORDER — FENTANYL CITRATE 50 UG/ML
INJECTION, SOLUTION INTRAMUSCULAR; INTRAVENOUS AS NEEDED
Status: DISCONTINUED | OUTPATIENT
Start: 2020-02-03 | End: 2020-02-03 | Stop reason: HOSPADM

## 2020-02-03 RX ORDER — TAMSULOSIN HYDROCHLORIDE 0.4 MG/1
0.4 CAPSULE ORAL
Status: DISCONTINUED | OUTPATIENT
Start: 2020-02-04 | End: 2020-02-04 | Stop reason: HOSPADM

## 2020-02-03 RX ORDER — FENTANYL CITRATE 50 UG/ML
100 INJECTION, SOLUTION INTRAMUSCULAR; INTRAVENOUS ONCE
Status: DISCONTINUED | OUTPATIENT
Start: 2020-02-03 | End: 2020-02-03 | Stop reason: HOSPADM

## 2020-02-03 RX ADMIN — ONDANSETRON 4 MG: 2 INJECTION INTRAMUSCULAR; INTRAVENOUS at 10:27

## 2020-02-03 RX ADMIN — LIDOCAINE HYDROCHLORIDE 100 MG: 20 INJECTION, SOLUTION EPIDURAL; INFILTRATION; INTRACAUDAL; PERINEURAL at 08:58

## 2020-02-03 RX ADMIN — PHENYLEPHRINE HYDROCHLORIDE 100 MCG: 10 INJECTION INTRAVENOUS at 09:28

## 2020-02-03 RX ADMIN — SODIUM CHLORIDE, SODIUM LACTATE, POTASSIUM CHLORIDE, AND CALCIUM CHLORIDE 150 ML/HR: 600; 310; 30; 20 INJECTION, SOLUTION INTRAVENOUS at 08:00

## 2020-02-03 RX ADMIN — ACETAMINOPHEN 500 MG: 500 TABLET, FILM COATED ORAL at 20:38

## 2020-02-03 RX ADMIN — AMOXICILLIN 500 MG: 500 CAPSULE ORAL at 15:18

## 2020-02-03 RX ADMIN — PHENYLEPHRINE HYDROCHLORIDE 50 MCG: 10 INJECTION INTRAVENOUS at 09:12

## 2020-02-03 RX ADMIN — SUCCINYLCHOLINE CHLORIDE 120 MG: 20 INJECTION, SOLUTION INTRAMUSCULAR; INTRAVENOUS at 08:58

## 2020-02-03 RX ADMIN — LISINOPRIL 40 MG: 20 TABLET ORAL at 20:26

## 2020-02-03 RX ADMIN — DILTIAZEM HYDROCHLORIDE 180 MG: 180 CAPSULE, COATED, EXTENDED RELEASE ORAL at 20:26

## 2020-02-03 RX ADMIN — PHENYLEPHRINE HYDROCHLORIDE 50 MCG: 10 INJECTION INTRAVENOUS at 09:15

## 2020-02-03 RX ADMIN — HYDROMORPHONE HYDROCHLORIDE 0.5 MG: 2 INJECTION INTRAMUSCULAR; INTRAVENOUS; SUBCUTANEOUS at 12:11

## 2020-02-03 RX ADMIN — ROCURONIUM BROMIDE 5 MG: 10 INJECTION, SOLUTION INTRAVENOUS at 08:58

## 2020-02-03 RX ADMIN — DEXAMETHASONE SODIUM PHOSPHATE 10 MG: 4 INJECTION, SOLUTION INTRAMUSCULAR; INTRAVENOUS at 09:13

## 2020-02-03 RX ADMIN — PHENYLEPHRINE HYDROCHLORIDE 100 MCG: 10 INJECTION INTRAVENOUS at 09:19

## 2020-02-03 RX ADMIN — METOPROLOL TARTRATE 0.5 MG: 5 INJECTION, SOLUTION INTRAVENOUS at 09:23

## 2020-02-03 RX ADMIN — PHENYLEPHRINE HYDROCHLORIDE 30 MCG/MIN: 10 INJECTION INTRAVENOUS at 09:31

## 2020-02-03 RX ADMIN — PROPOFOL 50 MG: 10 INJECTION, EMULSION INTRAVENOUS at 09:23

## 2020-02-03 RX ADMIN — PROPOFOL 120 MG: 10 INJECTION, EMULSION INTRAVENOUS at 08:58

## 2020-02-03 RX ADMIN — FUROSEMIDE 40 MG: 40 TABLET ORAL at 17:49

## 2020-02-03 RX ADMIN — FENTANYL CITRATE 100 MCG: 50 INJECTION INTRAMUSCULAR; INTRAVENOUS at 08:58

## 2020-02-03 RX ADMIN — HYDROMORPHONE HYDROCHLORIDE 0.5 MG: 2 INJECTION INTRAMUSCULAR; INTRAVENOUS; SUBCUTANEOUS at 11:41

## 2020-02-03 RX ADMIN — PHENYLEPHRINE HYDROCHLORIDE 300 MCG: 10 INJECTION INTRAVENOUS at 10:19

## 2020-02-03 NOTE — PROGRESS NOTES
TRANSFER - IN REPORT:    Verbal report received from [address] RN(name) on Kulwant Lewis  being received from PACU(unit) for routine progression of care      Report consisted of patients Situation, Background, Assessment and   Recommendations(SBAR). Information from the following report(s) SBAR, Kardex, OR Summary, Procedure Summary, Intake/Output and MAR was reviewed with the receiving nurse. Opportunity for questions and clarification was provided. Assessment completed upon patients arrival to unit and care assumed.

## 2020-02-03 NOTE — ANESTHESIA POSTPROCEDURE EVALUATION
Procedure(s):  TOTAL THYROIDECTOMY . general    Anesthesia Post Evaluation      Multimodal analgesia: multimodal analgesia used between 6 hours prior to anesthesia start to PACU discharge  Patient location during evaluation: bedside  Patient participation: complete - patient participated  Level of consciousness: awake and alert  Pain management: adequate  Airway patency: patent  Anesthetic complications: no  Cardiovascular status: hemodynamically stable  Respiratory status: spontaneous ventilation  Hydration status: euvolemic  Comments: Patient stable and may discharge at this time.         Vitals Value Taken Time   /84 2/3/2020 12:00 PM   Temp 36.4 °C (97.5 °F) 2/3/2020 11:06 AM   Pulse 82 2/3/2020 12:00 PM   Resp 18 2/3/2020 12:00 PM   SpO2 95 % 2/3/2020 12:00 PM

## 2020-02-03 NOTE — PERIOP NOTES
TRANSFER - OUT REPORT:    Verbal report given to [facility] DOMINGO GREENE on Lily Vargas  being transferred to [place] [address] for routine post - op       Report consisted of patients Situation, Background, Assessment and   Recommendations(SBAR). Information from the following report(s) SBAR, Kardex, Procedure Summary, Intake/Output, MAR and Cardiac Rhythm A-fib/paced was reviewed with the receiving nurse. Lines:   Peripheral IV 02/03/20 Left Wrist (Active)   Site Assessment Clean, dry, & intact 2/3/2020 11:30 AM   Phlebitis Assessment 0 2/3/2020 11:30 AM   Infiltration Assessment 0 2/3/2020 11:30 AM   Dressing Status Clean, dry, & intact 2/3/2020 11:30 AM   Dressing Type Tape;Transparent 2/3/2020 11:30 AM   Hub Color/Line Status Pink;Patent; Infusing 2/3/2020 11:30 AM        Opportunity for questions and clarification was provided.       Patient transported with:   Room air; IV fluids, patient chart

## 2020-02-03 NOTE — PROGRESS NOTES
Received to room from PACU. Alert and oriented x3. Denies pain. Incision to neck is open to air and approximated with sutures. Tolerating liquids without problem. Discussed pain, diet and bed functions. Voiced understanding. Family at bedside.

## 2020-02-04 VITALS
TEMPERATURE: 97.7 F | SYSTOLIC BLOOD PRESSURE: 140 MMHG | OXYGEN SATURATION: 97 % | WEIGHT: 199.2 LBS | RESPIRATION RATE: 16 BRPM | HEART RATE: 100 BPM | BODY MASS INDEX: 27.78 KG/M2 | DIASTOLIC BLOOD PRESSURE: 80 MMHG

## 2020-02-04 LAB
CALCIUM SERPL-MCNC: 9.4 MG/DL (ref 8.3–10.4)
CALCIUM SERPL-MCNC: 9.5 MG/DL (ref 8.3–10.4)
CALCIUM SERPL-MCNC: 9.8 MG/DL (ref 8.3–10.4)
CALCIUM SERPL-MCNC: 9.9 MG/DL (ref 8.3–10.4)
INR PPP: 1
PROTHROMBIN TIME: 13.7 SEC (ref 12–14.7)

## 2020-02-04 PROCEDURE — 94760 N-INVAS EAR/PLS OXIMETRY 1: CPT

## 2020-02-04 PROCEDURE — 85610 PROTHROMBIN TIME: CPT

## 2020-02-04 PROCEDURE — 82310 ASSAY OF CALCIUM: CPT

## 2020-02-04 PROCEDURE — 99218 HC RM OBSERVATION: CPT

## 2020-02-04 PROCEDURE — 74011250637 HC RX REV CODE- 250/637: Performed by: OTOLARYNGOLOGY

## 2020-02-04 RX ADMIN — DILTIAZEM HYDROCHLORIDE 180 MG: 180 CAPSULE, COATED, EXTENDED RELEASE ORAL at 08:23

## 2020-02-04 RX ADMIN — AMOXICILLIN 500 MG: 500 CAPSULE ORAL at 00:10

## 2020-02-04 RX ADMIN — PANTOPRAZOLE SODIUM 40 MG: 40 TABLET, DELAYED RELEASE ORAL at 06:32

## 2020-02-04 RX ADMIN — LISINOPRIL 40 MG: 20 TABLET ORAL at 08:23

## 2020-02-04 RX ADMIN — TAMSULOSIN HYDROCHLORIDE 0.4 MG: 0.4 CAPSULE ORAL at 06:32

## 2020-02-04 RX ADMIN — METOPROLOL SUCCINATE 25 MG: 25 TABLET, EXTENDED RELEASE ORAL at 08:23

## 2020-02-04 RX ADMIN — FUROSEMIDE 40 MG: 40 TABLET ORAL at 08:23

## 2020-02-04 NOTE — OP NOTES
[address] [address]  OPERATIVE REPORT    Name:  Joseph Chin  MR#:  [id number]  :  1935  ACCOUNT #:  [de-identified]  DATE OF SERVICE:  2020    PREOPERATIVE DIAGNOSIS:  Left thyroid mass. POSTOPERATIVE DIAGNOSIS:  Left thyroid mass. PROCEDURE PERFORMED:  Total thyroidectomy. SURGEON:  Neil Gorman. DO Alan    ASSISTANT:  None. ANESTHESIA:  General.    COMPLICATIONS:  None. SPECIMENS REMOVED:  Total thyroid. IMPLANTS:  None. ESTIMATED BLOOD LOSS:  Less than 10 mL. HISTORY:  This is an 77-year-old male who was sent to see me by Dr. Nata Jason with Endocrinology. He was feeling weak in 2019. He was losing his breath. He does have a history of atrial fibrillation and having thoughts of that does seem to make him worse. Due to shortness of breath, he saw cardiologist and electrophysiologist, and he did great with his workup. He was then sent to Pulmonary. He tried some inhalers which helped just a little. He got thrush. He had a CT scan done which showed a pulmonary nodule. They did check a PET CT scan which showed a hyperintensity in the left thyroid, so he was sent to see Dr. Nata Jason. He did an ultrasound-guided needle biopsy in the office, this was indeterminate. He had genetic testing, was done on the nodule, which came back as suspicious with a risk of malignancy approximately 50%. He was sent to see me for surgical evaluation. He does have some hoarseness but it is more of a weakness to the breath. He is having no issues with swallowing. No food is going down the wrong tube. I did do a scope in the office which showed normal true vocal cord movement but based on the history and physical exam, especially with the findings on the ultrasound and a genetic testing, it was my recommendation that he undergo a left thyroidectomy with nerve integrity monitoring, frozen section, possible total thyroidectomy.   The procedure risks and benefits were discussed with him and his wife in the office. All questions were answered and he was agreeable to the surgery. SURGERY DETAILS:  The patient was identified in the preoperative waiting area. He was taken back to the operating room where he underwent general anesthesia. Probes for the stimulation probe were placed in the anterior chest wall. Intubation was done with a nerve integrity monitoring endotracheal tube. The patient's head was left in the midline. Neck was extended with the head supported and then he was prepped and draped in a sterile fashion. I was able to david out the prominent landmarks in the anterior neck including the sternal notch, sternocleidomastoid muscles, cricoid cartilage and the thyroid gland. I then marked out a prominent neck crease below the level of the cricoid cartilage. It was approximately 4 cm in length and I injected less than 1 mL of 1% lidocaine with epinephrine into that pre-drawn line. After a few minutes, an incision was made through that pre-drawn line, down through subcutaneous fat, identified the platysma, was able to break down the platysma, and superior and inferior subplatysmal flaps were raised. Stay sutures were used to hold the four corners of the platysma, holding the incision open. Strap muscles were split in the midline, taken down to the level of the thyroid gland, was able to immediately identify the thyroid gland, you can feel the firm nodules in the inferior portion of the left thyroid. Starting on the left, I was able to use blunt dissection going around the outside of the gland. I was able to identify the recurrent laryngeal nerve near the tracheoesophageal groove. Superior and inferior parathyroid glands were identified. I was able to hug the gland and ligated the superior and inferior thyroid vessels using a harmonic scalpel deflecting them laterally. I did identify the middle thyroid vein which was also ligated using a harmonic scalpel.   Then I was able to get around the gland completely and it was completely removed in its entirety. There was no palpable lymph nodes in the neck, took this over the anterior portion of the trachea, went beyond towards the right side, again blunt dissection, feeling the inferior pole. 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 Using blunt dissection, going around the gland, was able to identify superior and inferior vessels, again ligated using a harmonic scalpel. There was more scar tissue posteriorly on the right side. I was able to break down the scar tissue, ligated the middle thyroid vein. I was able to identify superior and inferior parathyroid glands along with his superior and inferior vessels and they were all retracted laterally. The parathyroid glands did appear to be healthy even at the end of the case and I was able to remove the rest of the thyroid gland taking care to protect the recurrent laryngeal nerve. I marked the left thyroid. I did send it away. 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 There was good stimulation of the recurrent laryngeal nerve and so then 4-0 chromic was used to reapproximate the platysma, 5-0 nylon was used to reapproximate the skin. The patient was then awakened and he was taken to the postop recovery room in stable condition.         DO HENRY [id number]  D:  02/03/2020 12:01  T:  02/03/2020 23:00  JOB #:  [id number]

## 2020-02-04 NOTE — CONSULTS
BRIEF HOSPITALIST CONSULT NOTE    Hospitalist service consulted for hypertension management of surgical patient. Past medical history reviewed. All home medications reviewed. All notes reviewed from this admission. All vital signs reviewed. All available diagnostics reviewed. All active orders reviewed. Recommendations:     I have started the patient on labetalol 20 mg IV every 2 hours as needed for elevated blood pressure. Please continue the patient on home antihypertensives. Thank you for consulting the Hospitalist service for this patient. I will sign off. Please call with any questions.

## 2020-02-04 NOTE — DISCHARGE INSTRUCTIONS
DISCHARGE SUMMARY from Nurse    PATIENT INSTRUCTIONS:    After general anesthesia or intravenous sedation, for 24 hours or while taking prescription Narcotics:  · Limit your activities  · Do not drive and operate hazardous machinery  · Do not make important personal or business decisions  · Do  not drink alcoholic beverages  · If you have not urinated within 8 hours after discharge, please contact your surgeon on call. Report the following to your surgeon:  · Excessive pain, swelling, redness or odor of or around the surgical area  · Temperature over 100.5  · Nausea and vomiting lasting longer than 4 hours or if unable to take medications  · Any signs of decreased circulation or nerve impairment to extremity: change in color, persistent  numbness, tingling, coldness or increase pain  · Any questions    What to do at Home:  Recommended activity: Activity as tolerated and no driving for today or while taking pain meds    If you experience any of the following symptoms temp>101, please follow up with MD.    *  Please give a list of your current medications to your Primary Care Provider. *  Please update this list whenever your medications are discontinued, doses are      changed, or new medications (including over-the-counter products) are added. *  Please carry medication information at all times in case of emergency situations. These are general instructions for a healthy lifestyle:    No smoking/ No tobacco products/ Avoid exposure to second hand smoke  Surgeon General's Warning:  Quitting smoking now greatly reduces serious risk to your health.     Obesity, smoking, and sedentary lifestyle greatly increases your risk for illness    A healthy diet, regular physical exercise & weight monitoring are important for maintaining a healthy lifestyle    You may be retaining fluid if you have a history of heart failure or if you experience any of the following symptoms:  Weight gain of 3 pounds or more overnight or 5 pounds in a week, increased swelling in our hands or feet or shortness of breath while lying flat in bed. Please call your doctor as soon as you notice any of these symptoms; do not wait until your next office visit. Patient Education        Thyroidectomy: What to Expect at Home  Your Recovery    Thyroidectomy is the removal of the thyroid gland, which is shaped like a butterfly and lies across the windpipe (trachea). The gland makes hormones that control how your body makes and uses energy (metabolism). A doctor removes the gland when a tumor is present. The doctor may also remove the gland if you have an enlarged thyroid that is causing symptoms that bother you. Most tumors that grow in the thyroid gland are benign, meaning they are not cancer. You may leave the hospital with stitches in the cut (incision) the doctor made. Your doctor will tell you if you need to come back to have these removed. You may still have a tube called a drain in your neck. Your doctor will take this out a few days after your surgery. You may have some trouble chewing and swallowing after you go home. Your voice probably will be hoarse, and you may have trouble talking. For most people, these problems get better within 3 to 4 months, but it can take as long as a year. In some cases, this surgery causes permanent problems with chewing, speaking, or swallowing. This care sheet gives you a general idea about how long it will take for you to recover. But each person recovers at a different pace. Follow the steps below to get better as quickly as possible. How can you care for yourself at home? Activity    · Rest when you feel tired. Getting enough sleep will help you recover. When you lie down, put two or three pillows under your head to keep it raised.     · Try to walk each day. Start by walking a little more than you did the day before. Bit by bit, increase the amount you walk.  Walking boosts blood flow and helps prevent pneumonia and constipation.     · Avoid strenuous physical activity and lifting heavy objects for 3 weeks after surgery or until your doctor says it is okay.     · Do not over-extend your neck backwards for 2 weeks after surgery.     · Ask your doctor when you can drive again.     · You may take a shower, unless you still have a drain near your incision. Pat the incision dry. If you have a drain, follow your doctor's instructions to care for it. Diet    · If it is painful to swallow, start out with cold drinks, flavored ice pops, and ice cream. Next, try soft foods like pudding, yogurt, canned or cooked fruit, scrambled eggs, and mashed potatoes. Avoid eating hard or scratchy foods like chips or raw vegetables. Avoid orange or tomato juice and other acidic foods that can sting the throat.     · If you cough right after drinking, try drinking thicker liquids, such as a smoothie.     · You may notice that your bowel movements are not regular right after your surgery. This is common. Try to avoid constipation and straining with bowel movements. You may want to take a fiber supplement every day. If you have not had a bowel movement after a couple of days, ask your doctor about taking a mild laxative. Medicines    · Your doctor will tell you if and when you can restart your medicines. He or she will also give you instructions about taking any new medicines.     · If you take blood thinners, such as warfarin (Coumadin), clopidogrel (Plavix), or aspirin, be sure to talk to your doctor. He or she will tell you if and when to start taking those medicines again. Make sure that you understand exactly what your doctor wants you to do.     · Be safe with medicines. Take pain medicines exactly as directed. ? If the doctor gave you a prescription medicine for pain, take it as prescribed.   ? If you are not taking a prescription pain medicine, ask your doctor if you can take an over-the-counter medicine.     · If you think your pain medicine is making you sick to your stomach:  ? Take your medicine after meals (unless your doctor has told you not to). ? Ask your doctor for a different pain medicine.     · Your doctor may prescribe calcium to prevent problems after surgery from low calcium. Not having enough calcium can cause symptoms such as tingling around your mouth or in your hands and feet.     · Your doctor may have prescribed antibiotics. Take them as directed. Do not stop taking them just because you feel better. You need to take the full course of antibiotics. Incision care    · If your doctor told you how to care for your incision, follow your doctor's instructions. If you did not get instructions, follow this general advice:  ? After the first 24 to 48 hours, wash around the wound with clean water 2 times a day. Don't use hydrogen peroxide or alcohol, which can slow healing.     · You may have a drain near your incision. Your doctor will tell you how to take care of it. Follow-up care is a key part of your treatment and safety. Be sure to make and go to all appointments, and call your doctor if you are having problems. It's also a good idea to know your test results and keep a list of the medicines you take. When should you call for help? Call 911 anytime you think you may need emergency care. For example, call if:    · You passed out (lost consciousness).     · You have sudden chest pain and shortness of breath, or you cough up blood.     · You have severe trouble breathing.    Call your doctor now or seek immediate medical care if:    · You have loose stitches, or your incision comes open.     · Bleeding from your incision soaks through your bandages.     · You have signs of infection, such as:  ? Increased pain, swelling, warmth, or redness. ? Red streaks leading from the incision. ? Pus draining from the incision. ?  A fever.     · You have a tingling feeling around your mouth.     · You have cramping or tingling in your hands and feet.    Watch closely for changes in your health, and be sure to contact your doctor if:    · You have trouble talking.     · You are sick to your stomach or cannot keep fluids down.     · You do not have a bowel movement after taking a laxative. Where can you learn more? Go to http://fabby-elzbieta.info/. Enter [id number] in the search box to learn more about \"Thyroidectomy: What to Expect at Home. \"  Current as of: November 6, 2018  Content Version: 12.2  © 6267-4291 [employer]. Care instructions adapted under license by [employer] (which disclaims liability or warranty for this information). If you have questions about a medical condition or this instruction, always ask your healthcare professional. [address] any warranty or liability for your use of this information. The discharge information has been reviewed with the patient. The patient verbalized understanding. Discharge medications reviewed with the patient and appropriate educational materials and side effects teaching were provided.   ___________________________________________________________________________________________________________________________________

## 2020-02-04 NOTE — PROGRESS NOTES
Pt is resting in bed with his  wife at bedside. Alert and oriented x's 4,   Pt denies any pain,  Incision is intact and LORI. Mupricin ointment in room, gave the wife some swabs to use to put on the incision. Assisted pt up to the bathroom and back to bed. Pt ambulates well and without any unsteadiness. Bed is in low locked position and call [facility] within reach.

## 2020-02-04 NOTE — PROGRESS NOTES
Shift Assessment Complete. Pt is post op day 1  Pt is A&Ox 3. Incision open to air   Pt denies any pain or need at this time. Bed low and locked. Side rails x3. Call light with in reach. Pt verbalizes understanding of call light.

## 2020-05-13 PROBLEM — E04.1 THYROID NODULE: Status: RESOLVED | Noted: 2019-10-21 | Resolved: 2020-05-13

## 2020-08-05 ENCOUNTER — HOSPITAL ENCOUNTER (OUTPATIENT)
Dept: LAB | Age: 85
Discharge: HOME | End: 2020-08-05
Payer: MEDICARE

## 2020-08-05 DIAGNOSIS — E03.9 PRIMARY HYPOTHYROIDISM: ICD-10-CM

## 2020-08-05 LAB
T4 FREE SERPL-MCNC: 1.1 NG/DL (ref 0.78–1.46)
TSH SERPL DL<=0.005 MIU/L-ACNC: 5.82 UIU/ML (ref 0.36–3.74)

## 2020-08-05 PROCEDURE — 84432 ASSAY OF THYROGLOBULIN: CPT

## 2020-08-05 PROCEDURE — 84443 ASSAY THYROID STIM HORMONE: CPT

## 2020-08-05 PROCEDURE — 86800 THYROGLOBULIN ANTIBODY: CPT

## 2020-08-05 PROCEDURE — 36415 COLL VENOUS BLD VENIPUNCTURE: CPT

## 2020-08-05 PROCEDURE — 84439 ASSAY OF FREE THYROXINE: CPT

## 2020-08-07 LAB — THYROGLOB AB SERPL-ACNC: 89.4 IU/ML (ref 0–0.9)

## 2020-08-12 LAB — THYROGLOBULIN, 803881: 3.5 NG/ML

## 2020-08-12 NOTE — PROGRESS NOTES
Please let  Shelbi Thakkar know that the lab did not run his thyroglobulin (thyroid cancer tumor marker), and it looks like it was negative. That is great news.

## 2020-09-25 ENCOUNTER — HOSPITAL ENCOUNTER (OUTPATIENT)
Dept: ULTRASOUND IMAGING | Age: 85
Discharge: HOME | End: 2020-09-25
Attending: NURSE PRACTITIONER
Payer: MEDICARE

## 2020-09-25 DIAGNOSIS — N28.1 RENAL CYST: ICD-10-CM

## 2020-09-25 DIAGNOSIS — N40.1 BENIGN PROSTATIC HYPERPLASIA WITH LOWER URINARY TRACT SYMPTOMS, SYMPTOM DETAILS UNSPECIFIED: ICD-10-CM

## 2020-09-25 PROCEDURE — 76770 US EXAM ABDO BACK WALL COMP: CPT

## 2020-10-01 ENCOUNTER — HOSPITAL ENCOUNTER (OUTPATIENT)
Dept: LAB | Age: 85
Discharge: HOME | End: 2020-10-01
Payer: MEDICARE

## 2020-10-01 DIAGNOSIS — C73 FOLLICULAR THYROID CARCINOMA (HCC): ICD-10-CM

## 2020-10-01 DIAGNOSIS — E03.9 PRIMARY HYPOTHYROIDISM: ICD-10-CM

## 2020-10-01 DIAGNOSIS — C73 PAPILLARY THYROID CARCINOMA (HCC): ICD-10-CM

## 2020-10-01 LAB
T4 FREE SERPL-MCNC: 1.3 NG/DL (ref 0.78–1.46)
TSH SERPL DL<=0.005 MIU/L-ACNC: 1.33 UIU/ML (ref 0.36–3.74)

## 2020-10-01 PROCEDURE — 36415 COLL VENOUS BLD VENIPUNCTURE: CPT

## 2020-10-01 PROCEDURE — 84443 ASSAY THYROID STIM HORMONE: CPT

## 2020-10-01 PROCEDURE — 84432 ASSAY OF THYROGLOBULIN: CPT

## 2020-10-01 PROCEDURE — 84439 ASSAY OF FREE THYROXINE: CPT

## 2020-10-01 PROCEDURE — 86800 THYROGLOBULIN ANTIBODY: CPT

## 2020-10-02 LAB — THYROGLOB AB SERPL-ACNC: 67.3 IU/ML (ref 0–0.9)

## 2020-10-10 LAB — THYROGLOBULIN, 803881: 4.1 NG/ML

## 2020-12-03 ENCOUNTER — HOSPITAL ENCOUNTER (OUTPATIENT)
Dept: CT IMAGING | Age: 85
Discharge: HOME | End: 2020-12-03
Attending: INTERNAL MEDICINE
Payer: MEDICARE

## 2020-12-03 DIAGNOSIS — R91.8 PULMONARY NODULES: ICD-10-CM

## 2020-12-03 PROCEDURE — 71250 CT THORAX DX C-: CPT

## 2021-04-07 ENCOUNTER — HOSPITAL ENCOUNTER (OUTPATIENT)
Dept: LAB | Age: 86
Discharge: HOME | End: 2021-04-07
Payer: MEDICARE

## 2021-04-07 DIAGNOSIS — E03.9 PRIMARY HYPOTHYROIDISM: ICD-10-CM

## 2021-04-07 DIAGNOSIS — C73 FOLLICULAR THYROID CARCINOMA (HCC): ICD-10-CM

## 2021-04-07 DIAGNOSIS — C73 PAPILLARY THYROID CARCINOMA (HCC): ICD-10-CM

## 2021-04-07 LAB
T4 FREE SERPL-MCNC: 1.2 NG/DL (ref 0.78–1.46)
TSH SERPL DL<=0.005 MIU/L-ACNC: 1.47 UIU/ML (ref 0.36–3.74)

## 2021-04-07 PROCEDURE — 84432 ASSAY OF THYROGLOBULIN: CPT

## 2021-04-07 PROCEDURE — 86800 THYROGLOBULIN ANTIBODY: CPT

## 2021-04-07 PROCEDURE — 84443 ASSAY THYROID STIM HORMONE: CPT

## 2021-04-07 PROCEDURE — 36415 COLL VENOUS BLD VENIPUNCTURE: CPT

## 2021-04-07 PROCEDURE — 84439 ASSAY OF FREE THYROXINE: CPT

## 2021-04-08 LAB
THYROGLOB AB SERPL-ACNC: 24.7 IU/ML (ref 0–0.9)
THYROGLOB SERPL-MCNC: ABNORMAL NG/ML
THYROGLOBULIN ANTIBODY, 006697: ABNORMAL IU/ML

## 2021-04-15 PROBLEM — I48.0 PAROXYSMAL ATRIAL FIBRILLATION (HCC): Status: ACTIVE | Noted: 2017-01-17

## 2021-04-18 LAB — THYROGLOBULIN, 803881: 4.4 NG/ML

## 2021-07-28 PROBLEM — R29.898 WEAKNESS OF BOTH LEGS: Status: ACTIVE | Noted: 2021-07-28

## 2021-10-18 ENCOUNTER — HOSPITAL ENCOUNTER (OUTPATIENT)
Dept: LAB | Age: 86
Discharge: HOME | End: 2021-10-18
Payer: MEDICARE

## 2021-10-18 DIAGNOSIS — E03.9 PRIMARY HYPOTHYROIDISM: ICD-10-CM

## 2021-10-18 DIAGNOSIS — C73 FOLLICULAR THYROID CARCINOMA (HCC): ICD-10-CM

## 2021-10-18 LAB
T4 FREE SERPL-MCNC: 1.3 NG/DL (ref 0.78–1.46)
TSH SERPL DL<=0.005 MIU/L-ACNC: 0.58 UIU/ML (ref 0.36–3.74)

## 2021-10-18 PROCEDURE — 36415 COLL VENOUS BLD VENIPUNCTURE: CPT

## 2021-10-18 PROCEDURE — 84443 ASSAY THYROID STIM HORMONE: CPT

## 2021-10-18 PROCEDURE — 84432 ASSAY OF THYROGLOBULIN: CPT

## 2021-10-18 PROCEDURE — 86800 THYROGLOBULIN ANTIBODY: CPT

## 2021-10-18 PROCEDURE — 84439 ASSAY OF FREE THYROXINE: CPT

## 2021-10-19 LAB — THYROGLOB AB SERPL-ACNC: 16.6 IU/ML (ref 0–0.9)

## 2021-10-26 LAB — THYROGLOBULIN, 803881: 2.6 NG/ML

## 2021-12-28 ENCOUNTER — HOSPITAL ENCOUNTER (OUTPATIENT)
Dept: CT IMAGING | Age: 86
Discharge: HOME | End: 2021-12-28
Attending: INTERNAL MEDICINE
Payer: MEDICARE

## 2021-12-28 DIAGNOSIS — R91.1 LUNG NODULE: ICD-10-CM

## 2021-12-28 PROCEDURE — 71250 CT THORAX DX C-: CPT

## 2022-03-18 PROBLEM — R29.898 WEAKNESS OF BOTH LEGS: Status: ACTIVE | Noted: 2021-07-28

## 2022-03-18 PROBLEM — R49.0 HOARSENESS: Status: ACTIVE | Noted: 2018-02-23

## 2022-03-19 PROBLEM — Z98.890 S/P TOTAL THYROIDECTOMY: Status: ACTIVE | Noted: 2020-02-03

## 2022-03-19 PROBLEM — E89.0 S/P TOTAL THYROIDECTOMY: Status: ACTIVE | Noted: 2020-02-03

## 2022-03-19 PROBLEM — Z90.89 S/P TOTAL THYROIDECTOMY: Status: ACTIVE | Noted: 2020-02-03

## 2022-03-19 PROBLEM — K21.00 REFLUX ESOPHAGITIS: Status: ACTIVE | Noted: 2018-02-23

## 2022-03-19 PROBLEM — R06.09 DYSPNEA ON EXERTION: Status: ACTIVE | Noted: 2018-02-05

## 2022-03-19 PROBLEM — I49.5 SSS (SICK SINUS SYNDROME) (HCC): Status: ACTIVE | Noted: 2017-12-06

## 2022-03-20 PROBLEM — I48.0 PAROXYSMAL ATRIAL FIBRILLATION (HCC): Status: ACTIVE | Noted: 2017-01-17

## 2022-04-19 ENCOUNTER — HOSPITAL ENCOUNTER (OUTPATIENT)
Dept: LAB | Age: 87
Discharge: HOME | End: 2022-04-19
Payer: MEDICARE

## 2022-04-19 DIAGNOSIS — C73 PAPILLARY THYROID CARCINOMA (HCC): ICD-10-CM

## 2022-04-19 DIAGNOSIS — C73 FOLLICULAR THYROID CARCINOMA (HCC): ICD-10-CM

## 2022-04-19 DIAGNOSIS — E03.9 PRIMARY HYPOTHYROIDISM: ICD-10-CM

## 2022-04-19 LAB
T4 FREE SERPL-MCNC: 1.4 NG/DL (ref 0.9–1.8)
TSH SERPL DL<=0.005 MIU/L-ACNC: 0.44 UIU/ML (ref 0.36–3.74)

## 2022-04-19 PROCEDURE — 84432 ASSAY OF THYROGLOBULIN: CPT

## 2022-04-19 PROCEDURE — 84443 ASSAY THYROID STIM HORMONE: CPT

## 2022-04-19 PROCEDURE — 84439 ASSAY OF FREE THYROXINE: CPT

## 2022-04-19 PROCEDURE — 36415 COLL VENOUS BLD VENIPUNCTURE: CPT

## 2022-04-19 PROCEDURE — 86800 THYROGLOBULIN ANTIBODY: CPT

## 2022-04-20 LAB — THYROGLOB AB SERPL-ACNC: 40.7 IU/ML (ref 0–0.9)

## 2022-04-27 LAB — THYROGLOBULIN, 803881: 7.2 NG/ML

## 2022-05-24 ENCOUNTER — ANTI-COAG VISIT (OUTPATIENT)
Dept: CARDIOLOGY CLINIC | Age: 87
End: 2022-05-24
Payer: MEDICARE

## 2022-05-24 DIAGNOSIS — Z79.01 LONG TERM CURRENT USE OF ANTICOAGULANT: ICD-10-CM

## 2022-05-24 DIAGNOSIS — I48.21 PERMANENT ATRIAL FIBRILLATION (HCC): Primary | ICD-10-CM

## 2022-05-24 DIAGNOSIS — Z79.01 LONG TERM (CURRENT) USE OF ANTICOAGULANTS: ICD-10-CM

## 2022-05-24 LAB
POC INR: 2.2
PROTHROMBIN TIME, POC: NORMAL
VALID INTERNAL CONTROL, POC: YES

## 2022-05-24 PROCEDURE — 85610 PROTHROMBIN TIME: CPT | Performed by: INTERNAL MEDICINE

## 2022-05-24 PROCEDURE — 93793 ANTICOAG MGMT PT WARFARIN: CPT | Performed by: INTERNAL MEDICINE

## 2022-05-24 NOTE — PROGRESS NOTES
Anticoagulation Summary  As of 5/24/2022    INR goal:  2.0-3.0   TTR:     INR used for dosing:     Warfarin maintenance plan:  3.5 mg (1 mg x 1 and 2.5 mg x 1) every Mon, Wed, Fri; 2.5 mg (2.5 mg x 1) all other days   Weekly warfarin total:  20.5 mg   Plan last modified:  Miroslava Reeves MA (5/24/2022)   Next INR check:  6/21/2022   Target end date:      Indications    Permanent atrial fibrillation ([facility] [address] [I48.21]  Long term current use of anticoagulant [Z79.01]             Anticoagulation Episode Summary     INR check location:  Anticoagulation Clinic    Preferred lab:      Send INR reminders to:  [facility] CARDIOLOGY MELINDA PT    Comments:        Anticoagulation Care Providers     Provider Role Specialty Phone number    Kavin Peabody, MD [facility] Cardiology [phone]

## 2022-06-08 DIAGNOSIS — R91.1 LUNG NODULE: Primary | ICD-10-CM

## 2022-06-15 DIAGNOSIS — C73 FOLLICULAR THYROID CARCINOMA (HCC): ICD-10-CM

## 2022-06-15 DIAGNOSIS — C73 PAPILLARY THYROID CARCINOMA (HCC): ICD-10-CM

## 2022-06-15 DIAGNOSIS — E03.9 PRIMARY HYPOTHYROIDISM: Primary | ICD-10-CM

## 2022-06-23 ENCOUNTER — NURSE ONLY (OUTPATIENT)
Dept: CARDIOLOGY CLINIC | Age: 87
End: 2022-06-23
Payer: MEDICARE

## 2022-06-23 ENCOUNTER — ANTI-COAG VISIT (OUTPATIENT)
Dept: CARDIOLOGY CLINIC | Age: 87
End: 2022-06-23
Payer: MEDICARE

## 2022-06-23 DIAGNOSIS — Z79.01 LONG TERM CURRENT USE OF ANTICOAGULANT: ICD-10-CM

## 2022-06-23 DIAGNOSIS — I48.21 PERMANENT ATRIAL FIBRILLATION (HCC): Primary | ICD-10-CM

## 2022-06-23 DIAGNOSIS — Z79.01 LONG TERM (CURRENT) USE OF ANTICOAGULANTS: ICD-10-CM

## 2022-06-23 LAB
POC INR: 3.7
PROTHROMBIN TIME, POC: NORMAL
VALID INTERNAL CONTROL, POC: YES

## 2022-06-23 PROCEDURE — 93793 ANTICOAG MGMT PT WARFARIN: CPT | Performed by: INTERNAL MEDICINE

## 2022-06-23 PROCEDURE — 85610 PROTHROMBIN TIME: CPT | Performed by: INTERNAL MEDICINE

## 2022-06-23 PROCEDURE — 93288 INTERROG EVL PM/LDLS PM IP: CPT | Performed by: INTERNAL MEDICINE

## 2022-06-23 NOTE — PATIENT INSTRUCTIONS
Reminder: Please contact the Coumadin Clinic at [phone]  when you have medication changes. Examples, new medications, antibiotics, discontinued medications, new supplements, missed doses of warfarin or if you took extra doses of warfarin. This also includes OTC medications. Notifying us helps reduce the possibility of high and low INR's. In addition, if warfarin needs to be held for any procedures, please have surgeon or physician's office contact us before holding anticoagulant. Thanks, [facility] Cardiology Coumadin Clinic.

## 2022-06-23 NOTE — PROGRESS NOTES
Anticoagulation Summary  As of 6/23/2022    INR goal:  2.0-3.0   TTR:  31.5 % (2.9 wk)   INR used for dosing:  3.7 (6/23/2022)   Warfarin maintenance plan:  3.5 mg (1 mg x 1 and 2.5 mg x 1) every Mon, Wed, Fri; 2.5 mg (2.5 mg x 1) all other days   Weekly warfarin total:  20.5 mg   Plan last modified:  Val Browne MA (5/24/2022)   Next INR check:     Target end date:      Indications    Permanent atrial fibrillation ([address] [I48.21]  Long term current use of anticoagulant [Z79.01]             Anticoagulation Episode Summary     INR check location:  Anticoagulation Clinic    Preferred lab:      Send INR reminders to:  [facility] CARDIOLOGY MELINDA PT    Comments:        Anticoagulation Care Providers     Provider Role Specialty Phone number    Quang Sandoval MD [facility] Cardiology [phone]      pt is above range, pt denies any diet changes. Pt is taking voltaren, this known to increase INR.  Will decrease weekly dose and recheck in 2 week//KM

## 2022-06-23 NOTE — PROGRESS NOTES
This note will not be viewable in [address] IN OFFICE CHECK    Preliminary Impression: All normal function. No changes were made. Following MD: Dr. Dacia Rendon  Implanting MD: Dr. Anika Heath presented to the [facility] [address] today for a device check. No ventricular events. Chronic AF, on AC. Underlying in the 46s. Leads stable. No changes made. Normal PPM function. The device was interrogated, and the results were forwarded to the ordering provider for review.      Krystal Brower  6/23/2022  1:19 PM

## 2022-07-07 ENCOUNTER — ANTI-COAG VISIT (OUTPATIENT)
Dept: CARDIOLOGY CLINIC | Age: 87
End: 2022-07-07
Payer: MEDICARE

## 2022-07-07 DIAGNOSIS — I48.21 PERMANENT ATRIAL FIBRILLATION (HCC): Primary | ICD-10-CM

## 2022-07-07 DIAGNOSIS — Z79.01 LONG TERM CURRENT USE OF ANTICOAGULANT: ICD-10-CM

## 2022-07-07 DIAGNOSIS — Z79.01 LONG TERM (CURRENT) USE OF ANTICOAGULANTS: ICD-10-CM

## 2022-07-07 LAB
POC INR: 5.9
PROTHROMBIN TIME, POC: NORMAL
VALID INTERNAL CONTROL, POC: YES

## 2022-07-07 PROCEDURE — 85610 PROTHROMBIN TIME: CPT | Performed by: INTERNAL MEDICINE

## 2022-07-07 PROCEDURE — 93793 ANTICOAG MGMT PT WARFARIN: CPT | Performed by: INTERNAL MEDICINE

## 2022-07-07 NOTE — PATIENT INSTRUCTIONS
Reminder: Please contact the Coumadin Clinic at [phone]  when you have medication changes. Examples, new medications, antibiotics, discontinued medications, new supplements, missed doses of warfarin or if you took extra doses of warfarin. This also includes OTC medications. Notifying us helps reduce the possibility of high and low INR's. In addition, if warfarin needs to be held for any procedures, please have surgeon or physician's office contact us before holding anticoagulant. Thanks, [facility] Cardiology Coumadin Clinic.

## 2022-07-07 NOTE — PROGRESS NOTES
Anticoagulation Summary  As of 7/7/2022    INR goal:  2.0-3.0   TTR:  18.8 % (1.1 mo)   INR used for dosing:     Warfarin maintenance plan:  2.5 mg (2.5 mg x 1) every day   Weekly warfarin total:  17.5 mg   Plan last modified:  Nicky Stahl MA (7/7/2022)   Next INR check:  7/11/2022   Target end date:      Indications    Permanent atrial fibrillation ([address] [I48.21]  Long term current use of anticoagulant [Z79.01]             Anticoagulation Episode Summary     INR check location:  Anticoagulation Clinic    Preferred lab:      Send INR reminders to:  [facility] CARDIOLOGY MELINDA PT    Comments:        Anticoagulation Care Providers     Provider Role Specialty Phone number    Erin Partida MD [facility] Cardiology [phone]      Pt is above range, pt denies any diet or med changes. Spoke with Dr. Rosalina Luna, Pt is to hold for 2 days, and decrease weekly dose to 2.5 mg daily.  Recheck on Monday //KM

## 2022-07-11 ENCOUNTER — ANTI-COAG VISIT (OUTPATIENT)
Dept: CARDIOLOGY CLINIC | Age: 87
End: 2022-07-11
Payer: MEDICARE

## 2022-07-11 DIAGNOSIS — Z79.01 LONG TERM CURRENT USE OF ANTICOAGULANT: ICD-10-CM

## 2022-07-11 DIAGNOSIS — I48.21 PERMANENT ATRIAL FIBRILLATION (HCC): Primary | ICD-10-CM

## 2022-07-11 DIAGNOSIS — Z79.01 LONG TERM (CURRENT) USE OF ANTICOAGULANTS: ICD-10-CM

## 2022-07-11 LAB
POC INR: 2.3
PROTHROMBIN TIME, POC: NORMAL
VALID INTERNAL CONTROL, POC: YES

## 2022-07-11 PROCEDURE — 93793 ANTICOAG MGMT PT WARFARIN: CPT | Performed by: INTERNAL MEDICINE

## 2022-07-11 PROCEDURE — 85610 PROTHROMBIN TIME: CPT | Performed by: INTERNAL MEDICINE

## 2022-07-11 NOTE — PROGRESS NOTES
Anticoagulation Summary  As of 2022    INR goal:  2.0-3.0   TTR:  18.9 % (1.3 mo)   INR used for dosin.3 (2022)   Warfarin maintenance plan:  6 mg (1 mg x 1 and 5 mg x 1) every Mon, Wed, Fri; 5 mg (5 mg x 1) all other days   Weekly warfarin total:  38 mg   Plan last modified:  Francisco Javier Aguilar MA (2022)   Next INR check:  2022   Target end date:      Indications    Permanent atrial fibrillation ([address] [I48.21]  Long term current use of anticoagulant [Z79.01]             Anticoagulation Episode Summary     INR check location:  Anticoagulation Clinic    Preferred lab:      Send INR reminders to:  [facility] CARDIOLOGY MELINDA PT    Comments:        Anticoagulation Care Providers     Provider Role Specialty Phone number    Lakeisha Lambert MD Responsible Cardiology [phone]        Patient brought in his medication bottles for review, as he was concerned with his last INR being so elevated. Reviewed medications and noted that we had the wrong dose listed in his chart. Patient confirmed that he is taking 5mg on STTS, 6mg on MWF. Instructed him to continue this dose, as he was adamant that this is his correct dose, and recheck INR in 2 weeks to ensure therapeutic result. He verbalized understanding & thanked.  //pg

## 2022-07-25 ENCOUNTER — ANTI-COAG VISIT (OUTPATIENT)
Dept: CARDIOLOGY CLINIC | Age: 87
End: 2022-07-25
Payer: MEDICARE

## 2022-07-25 DIAGNOSIS — I48.21 PERMANENT ATRIAL FIBRILLATION (HCC): Primary | ICD-10-CM

## 2022-07-25 DIAGNOSIS — Z79.01 LONG TERM CURRENT USE OF ANTICOAGULANT: ICD-10-CM

## 2022-07-25 DIAGNOSIS — Z79.01 LONG TERM (CURRENT) USE OF ANTICOAGULANTS: ICD-10-CM

## 2022-07-25 LAB
POC INR: 8
PROTHROMBIN TIME, POC: NORMAL
VALID INTERNAL CONTROL, POC: YES

## 2022-07-25 PROCEDURE — 85610 PROTHROMBIN TIME: CPT | Performed by: INTERNAL MEDICINE

## 2022-07-25 PROCEDURE — 93793 ANTICOAG MGMT PT WARFARIN: CPT | Performed by: INTERNAL MEDICINE

## 2022-07-25 NOTE — PATIENT INSTRUCTIONS
Reminder: Please contact the Coumadin Clinic at [phone]  when you have medication changes. Examples, new medications, antibiotics, discontinued medications, new supplements, missed doses of warfarin or if you took extra doses of warfarin. This also includes OTC medications. Notifying us helps reduce the possibility of high and low INR's. In addition, if warfarin needs to be held for any procedures, please have surgeon or physician's office contact us before holding anticoagulant. Thanks, [facility] Cardiology Coumadin Clinic.

## 2022-07-25 NOTE — PROGRESS NOTES
Pt is supratherapuetic, this is likely due to pt taking the wrong dosage. The Pt had 5 mg tablets from previous dosages, and began taking the 5 mg tablets in place of the 2.5 mg tablet. Pt was instructed to dispose of the 5 mg tablets. Spoke with Dr. Sobeida Bower, pt is to hold for 3 days then recheck.  Pt is to return to previous dosage when it is safe to do so. //KM  Anticoagulation Summary  As of 7/25/2022      INR goal:  2.0-3.0   TTR:  17.1 % (1.7 mo)   INR used for dosing:     Warfarin maintenance plan:  3.5 mg (1 mg x 1 and 2.5 mg x 1) every Mon, Wed, Fri; 2.5 mg (2.5 mg x 1) all other days   Weekly warfarin total:  20.5 mg   Plan last modified:  Jatin Fischer MA (7/25/2022)   Next INR check:  7/28/2022   Target end date:      Indications    Permanent atrial fibrillation ([facility] [address] [I48.21]  Long term current use of anticoagulant [Z79.01]                 Anticoagulation Episode Summary       INR check location:  Anticoagulation Clinic    Preferred lab:      Send INR reminders to:  [facility] CARDIOLOGY MELINDA PT    Comments:            Anticoagulation Care Providers       Provider Role Specialty Phone number    Vandana Flower MD [facility] Cardiology [phone]

## 2022-07-28 ENCOUNTER — ANTI-COAG VISIT (OUTPATIENT)
Dept: CARDIOLOGY CLINIC | Age: 87
End: 2022-07-28
Payer: MEDICARE

## 2022-07-28 DIAGNOSIS — Z79.01 LONG TERM (CURRENT) USE OF ANTICOAGULANTS: ICD-10-CM

## 2022-07-28 DIAGNOSIS — I48.21 PERMANENT ATRIAL FIBRILLATION (HCC): Primary | ICD-10-CM

## 2022-07-28 DIAGNOSIS — Z79.01 LONG TERM CURRENT USE OF ANTICOAGULANT: ICD-10-CM

## 2022-07-28 LAB
POC INR: 2.4
PROTHROMBIN TIME, POC: YES

## 2022-07-28 PROCEDURE — 85610 PROTHROMBIN TIME: CPT | Performed by: INTERNAL MEDICINE

## 2022-07-28 PROCEDURE — 93793 ANTICOAG MGMT PT WARFARIN: CPT | Performed by: INTERNAL MEDICINE

## 2022-07-28 RX ORDER — WARFARIN SODIUM 1 MG/1
TABLET ORAL
Qty: 40 TABLET | Refills: 0 | Status: SHIPPED | OUTPATIENT
Start: 2022-07-28

## 2022-07-28 RX ORDER — WARFARIN SODIUM 2.5 MG/1
TABLET ORAL
Qty: 135 TABLET | Refills: 3 | Status: SHIPPED | OUTPATIENT
Start: 2022-07-28

## 2022-07-28 RX ORDER — WARFARIN SODIUM 1 MG/1
1 TABLET ORAL DAILY
Qty: 40 TABLET | Refills: 2 | Status: SHIPPED | OUTPATIENT
Start: 2022-07-28

## 2022-07-28 NOTE — PROGRESS NOTES
Pt brought in medication bottles. 5 mg tablets will be destroyed.  Pt will recheck in 2 weeks (refer to INR check dated 22). //KM  Anticoagulation Summary  As of 2022      INR goal:  2.0-3.0   TTR:  16.7 % (1.8 mo)   INR used for dosin.4 (2022)   Warfarin maintenance plan:  3.5 mg (1 mg x 1 and 2.5 mg x 1) every Mon, Wed, Fri; 2.5 mg (2.5 mg x 1) all other days   Weekly warfarin total:  20.5 mg   Plan last modified:  Flex Zuñiga MA (2022)   Next INR check:  2022   Target end date:      Indications    Permanent atrial fibrillation ([facility] [address] [I48.21]  Long term current use of anticoagulant [Z79.01]                 Anticoagulation Episode Summary       INR check location:  Anticoagulation Clinic    Preferred lab:      Send INR reminders to:  [facility] CARDIOLOGY MELINDA PT    Comments:            Anticoagulation Care Providers       Provider Role Specialty Phone number    Yudi Carrillo MD [facility] Cardiology [phone]

## 2022-07-28 NOTE — PATIENT INSTRUCTIONS
Reminder: Please contact the Coumadin Clinic at [phone]  when you have medication changes. Examples, new medications, antibiotics, discontinued medications, new supplements, missed doses of warfarin or if you took extra doses of warfarin. This also includes OTC medications. Notifying us helps reduce the possibility of high and low INR's. In addition, if warfarin needs to be held for any procedures, please have surgeon or physician's office contact us before holding anticoagulant. Thanks, [facility] Cardiology Coumadin Clinic.

## 2022-07-29 ENCOUNTER — OFFICE VISIT (OUTPATIENT)
Dept: CARDIOLOGY CLINIC | Age: 87
End: 2022-07-29
Payer: MEDICARE

## 2022-07-29 VITALS
HEIGHT: 71 IN | SYSTOLIC BLOOD PRESSURE: 134 MMHG | WEIGHT: 184 LBS | BODY MASS INDEX: 25.76 KG/M2 | HEART RATE: 72 BPM | DIASTOLIC BLOOD PRESSURE: 66 MMHG

## 2022-07-29 DIAGNOSIS — I70.0 AORTIC CALCIFICATION (HCC): ICD-10-CM

## 2022-07-29 DIAGNOSIS — Z95.0 CARDIAC PACEMAKER: Primary | ICD-10-CM

## 2022-07-29 DIAGNOSIS — I48.21 PERMANENT ATRIAL FIBRILLATION (HCC): ICD-10-CM

## 2022-07-29 DIAGNOSIS — I25.10 CORONARY ARTERY DISEASE INVOLVING NATIVE CORONARY ARTERY OF NATIVE HEART WITHOUT ANGINA PECTORIS: ICD-10-CM

## 2022-07-29 DIAGNOSIS — I10 ESSENTIAL HYPERTENSION: ICD-10-CM

## 2022-07-29 PROCEDURE — 1123F ACP DISCUSS/DSCN MKR DOCD: CPT | Performed by: INTERNAL MEDICINE

## 2022-07-29 PROCEDURE — 99214 OFFICE O/P EST MOD 30 MIN: CPT | Performed by: INTERNAL MEDICINE

## 2022-07-29 PROCEDURE — G8427 DOCREV CUR MEDS BY ELIG CLIN: HCPCS | Performed by: INTERNAL MEDICINE

## 2022-07-29 PROCEDURE — G8417 CALC BMI ABV UP PARAM F/U: HCPCS | Performed by: INTERNAL MEDICINE

## 2022-07-29 PROCEDURE — 1036F TOBACCO NON-USER: CPT | Performed by: INTERNAL MEDICINE

## 2022-07-29 ASSESSMENT — ENCOUNTER SYMPTOMS
HEMATOCHEZIA: 0
COLOR CHANGE: 0
ABDOMINAL PAIN: 0
WHEEZING: 0
BOWEL INCONTINENCE: 0
SPUTUM PRODUCTION: 0
HOARSE VOICE: 0
HEMATEMESIS: 0
SHORTNESS OF BREATH: 0
BLURRED VISION: 0
DIARRHEA: 0
ORTHOPNEA: 0

## 2022-07-29 NOTE — PROGRESS NOTES
[facility] CARDIOLOGY  [address], [address]  PHONE: [phone]        22        NAME:  Camden Song  : 1935  MRN: [id number]       SUBJECTIVE:   Camden Song is a 80 y.o. male seen for a follow up visit regarding the following: The patient has a hx of PAF ,SSS with PPM,CAD,and primary hypertension. He returns to discuss an X-ray finding that concerned chiropracter . Recent Xray showed aortic calcification in his abdominal aorta. Chief Complaint   Patient presents with    Atrial Fibrillation       HPI:    Hypertension  This is a chronic problem. The problem is unchanged. The problem is controlled. Pertinent negatives include no anxiety, blurred vision, chest pain, headaches, malaise/fatigue, neck pain, orthopnea, palpitations, peripheral edema, PND, shortness of breath or sweats. Past Medical History, Past Surgical History, Family history, Social History, and Medications were all reviewed with the patient today and updated as necessary.          Current Outpatient Medications:     warfarin (COUMADIN) 1 MG tablet, Take 1 tab as directed by [address] [place] [address] [facility] Cardiololgy, Disp: 40 tablet, Rfl: 0    warfarin (COUMADIN) 1 MG tablet, Take 1 tablet by mouth in the morning., Disp: 40 tablet, Rfl: 2    warfarin (COUMADIN) 2.5 MG tablet, Take 1 to 1.5 tabs by mouth, daily as directed by [address] [place] [address] 121 Cardiology, Disp: 135 tablet, Rfl: 3    albuterol sulfate  (90 Base) MCG/ACT inhaler, Inhale 2 puffs into the lungs every 4 hours as needed, Disp: , Rfl:     dilTIAZem (TIAZAC) 180 MG extended release capsule, TAKE 1 CAPSULE TWICE DAILY, Disp: , Rfl:     esomeprazole (NEXIUM) 40 MG delayed release capsule, Take by mouth daily, Disp: , Rfl:     furosemide (LASIX) 40 MG tablet, TAKE 1 TABLET TWICE DAILY, Disp: , Rfl:     hydrALAZINE (APRESOLINE) 25 MG tablet, TAKE 1 TABLET TWICE DAILY, Disp: , Rfl:     levothyroxine (SYNTHROID) 125 MCG tablet, Take 125 mcg by mouth every morning (before breakfast), Disp: , Rfl:     lisinopril (PRINIVIL;ZESTRIL) 40 MG tablet, TAKE 1 TABLET TWICE DAILY, Disp: , Rfl:     metoprolol succinate (TOPROL XL) 25 MG extended release tablet, Take 25 mg by mouth daily, Disp: , Rfl:     tamsulosin (FLOMAX) 0.4 MG capsule, TAKE 1 CAPSULE EVERY MORNING, Disp: , Rfl:   Allergies   Allergen Reactions    Hydrocodone-Acetaminophen Other (See Comments)     Severe restlessness    Oxycodone-Acetaminophen Nausea And Vomiting     Past Medical History:   Diagnosis Date    Arthritis     Atrial fibrillation ([facility] [address]     Coronary artery disease     ATZQY-[id number]     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 Ovi Cuadra [facility]) 2010    Microscopic hematuria     Obstructive sleep apnea on CPAP     Papillary thyroid carcinoma ([facility] [address]     Peptic ulcer disease     Primary hypothyroidism     Reflux esophagitis 2/23/2018    Transient ischemic attack (TIA) 10/31/2008     Past Surgical History:   Procedure Laterality Date    CARDIAC CATHETERIZATION  1995    angioplasty    HEMORRHOID SURGERY  1990s    HERNIA REPAIR Right 1990s    HERNIA REPAIR  01/2016    Ventral hernia (Dr. Rosalina Mathur)    MALIGNANT SKIN LESION EXCISION      2012    MOHS SURGERY  early 2000's    Right side RCR    OTHER SURGICAL HISTORY Left 08/12/2020    Basal cell surgery with skin graft left nose.   Dr. Nadira Garcia  01/19/2009    [employer] pacemaker     PACEMAKER  2018    replacement    THYROIDECTOMY  02/03/2020    Total thyroidectomy (Dr. Amie Amado)     Family History   Problem Relation Age of Onset    Stroke Sister     Hypertension Mother     Thyroid Cancer Neg Hx     Thyroid Disease Daughter         benign nodules    Breast Cancer Daughter         breast sarcoma    Breast Cancer Sister     Cancer Sister         leukemia    Stroke Mother     Breast Cancer Sister       Social History     Tobacco Use    Smoking status: Former     Packs/day: 2.00     Types: Cigarettes     Quit date: 1969     Years since quittin.6    Smokeless tobacco: Never   Substance Use Topics    Alcohol use: No       ROS:    Review of Systems   Constitutional: Negative for chills, decreased appetite, diaphoresis, fever and malaise/fatigue. HENT:  Negative for congestion, hearing loss, hoarse voice and nosebleeds. Eyes:  Negative for blurred vision. Cardiovascular:  Negative for chest pain, claudication, cyanosis, dyspnea on exertion, irregular heartbeat, leg swelling, near-syncope, orthopnea, palpitations, paroxysmal nocturnal dyspnea and syncope. Respiratory:  Negative for shortness of breath, sputum production and wheezing. Endocrine: Negative for polydipsia, polyphagia and polyuria. Skin:  Negative for color change. Musculoskeletal:  Negative for neck pain. Gastrointestinal:  Negative for abdominal pain, bowel incontinence, diarrhea, hematemesis and hematochezia. Genitourinary:  Negative for dysuria, frequency and hematuria. Neurological:  Negative for focal weakness, headaches, light-headedness, loss of balance, numbness, sensory change and weakness. Psychiatric/Behavioral:  Negative for altered mental status and memory loss. PHYSICAL EXAM:   /66   Pulse 72   Ht 5' 11\" (1.803 m)   Wt 184 lb (83.5 kg)   BMI 25.66 kg/m²      Physical Exam  Constitutional:       Appearance: Normal appearance. HENT:      Head: Normocephalic and atraumatic. Nose: Nose normal.   Eyes:      Extraocular Movements: Extraocular movements intact. Pupils: Pupils are equal, round, and reactive to light. Neck:      Vascular: No carotid bruit. Cardiovascular:      Rate and Rhythm: Regular rhythm. Pulses: Normal pulses. Heart sounds: No murmur heard. Pulmonary:      Effort: Pulmonary effort is normal.      Breath sounds: Normal breath sounds. Abdominal:      General: Abdomen is flat.  Bowel sounds are normal.      Palpations: Abdomen is soft. Musculoskeletal:         General: Normal range of motion. Cervical back: Normal range of motion and neck supple. Skin:     General: Skin is warm and dry. Neurological:      General: No focal deficit present. Mental Status: He is alert and oriented to person, place, and time. Psychiatric:         Mood and Affect: Mood normal.       Medical problems and test results were reviewed with the patient today. Recent Results (from the past 672 hour(s))   PT/INR (Resulted at [facility]/in-office AND billed by [facility])    Collection Time: 07/07/22  9:53 AM   Result Value Ref Range    Valid Internal Control, POC yes     Prothrombin time, POC      POC INR 5.9    PT/INR (Resulted at [facility]/in-office AND billed by [facility])    Collection Time: 07/11/22  1:59 PM   Result Value Ref Range    Valid Internal Control, POC yes     Prothrombin time, POC      POC INR 2.3    PT/INR (Resulted at [facility]/in-office AND billed by [facility])    Collection Time: 07/25/22  9:11 AM   Result Value Ref Range    Valid Internal Control, POC yes     Prothrombin time, POC      POC INR 8.0    PT/INR (Resulted at [facility]/in-office AND billed by [facility])    Collection Time: 07/28/22  1:31 PM   Result Value Ref Range    Prothrombin time, POC yes     POC INR 2.4        No results found for any visits on 07/29/22. ASSESSMENT and PLAN    Gemma López was seen today for atrial fibrillation. Diagnoses and all orders for this visit:    Cardiac pacemaker:Device clinic as scheduled. Permanent atrial fibrillation (HCC):Stable. Continue VKA,Tiazac,and Toprol. Coronary artery disease involving native coronary artery of native heart without angina pectoris:Stable. Continue Toprol and Lisinopril    Essential hypertension:Stable. Continue Tiazac,Lisinopril,Toprol, Lasix,and Apresoline. Aortic calcification (HCC)  -     Vascular AAA screening; Future        Disposition:    Return for Follow up after procedure.                 Dalila Pagan Sunil Lisa MD  7/29/2022  2:18 PM

## 2022-08-08 DIAGNOSIS — I25.10 CORONARY ARTERY DISEASE: Primary | ICD-10-CM

## 2022-08-11 ENCOUNTER — ANTI-COAG VISIT (OUTPATIENT)
Dept: CARDIOLOGY CLINIC | Age: 87
End: 2022-08-11
Payer: MEDICARE

## 2022-08-11 DIAGNOSIS — Z79.01 LONG TERM CURRENT USE OF ANTICOAGULANT: ICD-10-CM

## 2022-08-11 DIAGNOSIS — Z79.01 LONG TERM (CURRENT) USE OF ANTICOAGULANTS: ICD-10-CM

## 2022-08-11 DIAGNOSIS — I48.21 PERMANENT ATRIAL FIBRILLATION (HCC): Primary | ICD-10-CM

## 2022-08-11 LAB
POC INR: 2.3
PROTHROMBIN TIME, POC: YES

## 2022-08-11 PROCEDURE — 93793 ANTICOAG MGMT PT WARFARIN: CPT | Performed by: INTERNAL MEDICINE

## 2022-08-11 PROCEDURE — 85610 PROTHROMBIN TIME: CPT | Performed by: INTERNAL MEDICINE

## 2022-08-11 NOTE — PATIENT INSTRUCTIONS
Reminder: Please contact the Coumadin Clinic at [phone]  when you have medication changes. Examples, new medications, antibiotics, discontinued medications, new supplements, missed doses of warfarin or if you took extra doses of warfarin. This also includes OTC medications. Notifying us helps reduce the possibility of high and low INR's. In addition, if warfarin needs to be held for any procedures, please have surgeon or physician's office contact us before holding anticoagulant. Thanks, [facility] Cardiology Coumadin Clinic.

## 2022-08-12 ENCOUNTER — HOSPITAL ENCOUNTER (OUTPATIENT)
Dept: ULTRASOUND IMAGING | Age: 87
Discharge: HOME | End: 2022-08-15
Payer: MEDICARE

## 2022-08-12 DIAGNOSIS — I25.10 CORONARY ARTERY DISEASE: ICD-10-CM

## 2022-08-12 PROCEDURE — 76706 US ABDL AORTA SCREEN AAA: CPT

## 2022-09-15 ENCOUNTER — ANTI-COAG VISIT (OUTPATIENT)
Dept: CARDIOLOGY CLINIC | Age: 87
End: 2022-09-15
Payer: MEDICARE

## 2022-09-15 DIAGNOSIS — I48.21 PERMANENT ATRIAL FIBRILLATION (HCC): Primary | ICD-10-CM

## 2022-09-15 DIAGNOSIS — Z79.01 LONG TERM (CURRENT) USE OF ANTICOAGULANTS: ICD-10-CM

## 2022-09-15 DIAGNOSIS — Z79.01 LONG TERM CURRENT USE OF ANTICOAGULANT: ICD-10-CM

## 2022-09-15 LAB
POC INR: 4.9
PROTHROMBIN TIME, POC: YES

## 2022-09-15 PROCEDURE — 93793 ANTICOAG MGMT PT WARFARIN: CPT | Performed by: INTERNAL MEDICINE

## 2022-09-15 PROCEDURE — 85610 PROTHROMBIN TIME: CPT | Performed by: INTERNAL MEDICINE

## 2022-09-15 NOTE — PATIENT INSTRUCTIONS
Reminder: Please contact the Coumadin Clinic at [phone]  when you have medication changes. Examples, new medications, antibiotics, discontinued medications, new supplements, missed doses of warfarin or if you took extra doses of warfarin. This also includes OTC medications. Notifying us helps reduce the possibility of high and low INR's. In addition, if warfarin needs to be held for any procedures, please have surgeon or physician's office contact us before holding anticoagulant. Thanks, [facility] Cardiology Coumadin Clinic.

## 2022-09-15 NOTE — PROGRESS NOTES
Pt is above range, pt states that he uses Liniment that is known to increase INR.  Will decrease weekly dose and recheck in 2 weeks//KM    Anticoagulation Summary  As of 9/15/2022      INR goal:  2.0-3.0   TTR:  31.2 % (3.5 mo)   INR used for dosin.9 (9/15/2022)   Warfarin maintenance plan:  3.5 mg (1 mg x 1 and 2.5 mg x 1) every Mon; 2.5 mg (2.5 mg x 1) all other days   Weekly warfarin total:  18.5 mg   Plan last modified:  Brianda Sanches MA (9/15/2022)   Next INR check:  2022   Target end date:      Indications    Permanent atrial fibrillation ([facility] [address] [I48.21]  Long term current use of anticoagulant [Z79.01]                 Anticoagulation Episode Summary       INR check location:  Anticoagulation Clinic    Preferred lab:      Send INR reminders to:  [facility] CARDIOLOGY MELINDA PT    Comments:            Anticoagulation Care Providers       Provider Role Specialty Phone number    Donald Meier MD [facility] Cardiology [phone]

## 2022-09-26 ENCOUNTER — ANTI-COAG VISIT (OUTPATIENT)
Dept: CARDIOLOGY CLINIC | Age: 87
End: 2022-09-26
Payer: MEDICARE

## 2022-09-26 DIAGNOSIS — Z79.01 LONG TERM (CURRENT) USE OF ANTICOAGULANTS: ICD-10-CM

## 2022-09-26 DIAGNOSIS — Z79.01 LONG TERM CURRENT USE OF ANTICOAGULANT: ICD-10-CM

## 2022-09-26 DIAGNOSIS — I48.21 PERMANENT ATRIAL FIBRILLATION (HCC): Primary | ICD-10-CM

## 2022-09-26 LAB
POC INR: 1.3
PROTHROMBIN TIME, POC: YES

## 2022-09-26 PROCEDURE — 85610 PROTHROMBIN TIME: CPT | Performed by: INTERNAL MEDICINE

## 2022-09-26 PROCEDURE — 93793 ANTICOAG MGMT PT WARFARIN: CPT | Performed by: INTERNAL MEDICINE

## 2022-09-26 NOTE — PROGRESS NOTES
Pt is below range, this is likely due to pt deciding not to use the lineament known to increase INR and consuming vit K along with Warfarin adjustment. Pt states that he will no longer use the cream the he has used for months, will increase weekly dosage back to previous dose.      Anticoagulation Summary  As of 9/26/2022      INR goal:  2.0-3.0   TTR:  30.9 % (3.8 mo)   INR used for dosing:     Warfarin maintenance plan:  3.5 mg (1 mg x 1 and 2.5 mg x 1) every Mon, Wed, Fri; 2.5 mg (2.5 mg x 1) all other days   Weekly warfarin total:  20.5 mg   Plan last modified:  Felipa Parmar MA (9/26/2022)   Next INR check:  10/10/2022   Target end date:      Indications    Permanent atrial fibrillation ([address] [I48.21]  Long term current use of anticoagulant [Z79.01]                 Anticoagulation Episode Summary       INR check location:  Anticoagulation Clinic    Preferred lab:      Send INR reminders to:  [facility] CARDIOLOGY MELINDA PT    Comments:            Anticoagulation Care Providers       Provider Role Specialty Phone number    Jeffrey Martinez MD [facility] Cardiology [phone]

## 2022-09-26 NOTE — PATIENT INSTRUCTIONS
Please call for all appointments. Reminder: Please contact the Coumadin Clinic at [phone]  when you have medication changes. Examples, new medications, antibiotics, discontinued medications, new supplements, missed doses of warfarin or if you took extra doses of warfarin. This also includes OTC medications. Notifying us helps reduce the possibility of high and low INR's. In addition, if warfarin needs to be held for any procedures, please have surgeon or physician's office contact us before holding anticoagulant. Thanks, [facility] Cardiology Coumadin Clinic.

## 2022-09-27 RX ORDER — DILTIAZEM HYDROCHLORIDE 180 MG/1
CAPSULE, EXTENDED RELEASE ORAL
Qty: 180 CAPSULE | Refills: 3 | Status: SHIPPED | OUTPATIENT
Start: 2022-09-27

## 2022-09-27 RX ORDER — HYDRALAZINE HYDROCHLORIDE 25 MG/1
TABLET, FILM COATED ORAL
Qty: 180 TABLET | Refills: 3 | Status: SHIPPED | OUTPATIENT
Start: 2022-09-27

## 2022-09-27 NOTE — TELEPHONE ENCOUNTER
Diltiazem 180mg BID & Hydralazine 25mg BID verified in last office note, dated 7/29/22.  Medications pended & sent to Dr. Suzanna Khan for approval. //pg

## 2022-10-12 ENCOUNTER — ANTI-COAG VISIT (OUTPATIENT)
Dept: CARDIOLOGY CLINIC | Age: 87
End: 2022-10-12
Payer: MEDICARE

## 2022-10-12 DIAGNOSIS — Z79.01 LONG TERM (CURRENT) USE OF ANTICOAGULANTS: ICD-10-CM

## 2022-10-12 DIAGNOSIS — Z79.01 LONG TERM CURRENT USE OF ANTICOAGULANT: ICD-10-CM

## 2022-10-12 DIAGNOSIS — I48.21 PERMANENT ATRIAL FIBRILLATION (HCC): Primary | ICD-10-CM

## 2022-10-12 LAB
POC INR: 2.3
PROTHROMBIN TIME, POC: YES

## 2022-10-12 PROCEDURE — 85610 PROTHROMBIN TIME: CPT | Performed by: INTERNAL MEDICINE

## 2022-10-12 PROCEDURE — 93793 ANTICOAG MGMT PT WARFARIN: CPT | Performed by: INTERNAL MEDICINE

## 2022-10-12 NOTE — PROGRESS NOTES
Anticoagulation Summary  As of 10/12/2022      INR goal:  2.0-3.0   TTR:  30.8 % (4.4 mo)   INR used for dosin.3 (10/12/2022)   Warfarin maintenance plan:  3.5 mg (1 mg x 1 and 2.5 mg x 1) every Mon, Wed, Fri; 2.5 mg (2.5 mg x 1) all other days   Weekly warfarin total:  20.5 mg   Plan last modified:  Nickolas De La Cruz MA (2022)   Next INR check:  10/26/2022   Target end date:      Indications    Permanent atrial fibrillation ([facility] [address] [I48.21]  Long term current use of anticoagulant [Z79.01]                 Anticoagulation Episode Summary       INR check location:  Anticoagulation Clinic    Preferred lab:      Send INR reminders to:  [facility] CARDIOLOGY MELINDA PT    Comments:            Anticoagulation Care Providers       Provider Role Specialty Phone number    India Joshua MD [facility] Cardiology [phone]

## 2022-10-12 NOTE — PATIENT INSTRUCTIONS
Reminder: Please contact the Coumadin Clinic at [phone]  when you have medication changes. Examples, new medications, antibiotics, discontinued medications, new supplements, missed doses of warfarin or if you took extra doses of warfarin. This also includes OTC medications. Notifying us helps reduce the possibility of high and low INR's. In addition, if warfarin needs to be held for any procedures, please have surgeon or physician's office contact us before holding anticoagulant. Thanks, [place] Cardiology Coumadin Clinic.

## 2022-10-13 DIAGNOSIS — C73 PAPILLARY THYROID CARCINOMA (HCC): ICD-10-CM

## 2022-10-13 DIAGNOSIS — E03.9 PRIMARY HYPOTHYROIDISM: ICD-10-CM

## 2022-10-13 DIAGNOSIS — C73 FOLLICULAR THYROID CARCINOMA (HCC): ICD-10-CM

## 2022-10-13 LAB
T4 FREE SERPL-MCNC: 1.1 NG/DL (ref 0.78–1.46)
TSH, 3RD GENERATION: 2.59 UIU/ML (ref 0.36–3.74)

## 2022-10-15 LAB — THYROGLOB AB SERPL-ACNC: 60.2 IU/ML (ref 0–0.9)

## 2022-10-21 ENCOUNTER — ANTI-COAG VISIT (OUTPATIENT)
Dept: CARDIOLOGY CLINIC | Age: 87
End: 2022-10-21
Payer: MEDICARE

## 2022-10-21 DIAGNOSIS — Z79.01 LONG TERM CURRENT USE OF ANTICOAGULANT: ICD-10-CM

## 2022-10-21 DIAGNOSIS — Z79.01 LONG TERM (CURRENT) USE OF ANTICOAGULANTS: ICD-10-CM

## 2022-10-21 DIAGNOSIS — I48.21 PERMANENT ATRIAL FIBRILLATION (HCC): Primary | ICD-10-CM

## 2022-10-21 LAB
POC INR: 2.8
PROTHROMBIN TIME, POC: YES

## 2022-10-21 PROCEDURE — 93793 ANTICOAG MGMT PT WARFARIN: CPT | Performed by: INTERNAL MEDICINE

## 2022-10-21 PROCEDURE — 85610 PROTHROMBIN TIME: CPT | Performed by: INTERNAL MEDICINE

## 2022-10-21 NOTE — PROGRESS NOTES
Anticoagulation Summary  As of 10/21/2022      INR goal:  2.0-3.0   TTR:  35.1 % (4.7 mo)   INR used for dosin.8 (10/21/2022)   Warfarin maintenance plan:  3.5 mg (1 mg x 1 and 2.5 mg x 1) every Mon, Wed, Fri; 2.5 mg (2.5 mg x 1) all other days; Starting 10/21/2022   Weekly warfarin total:  20.5 mg   Plan last modified:  Natalie Wilson MA (2022)   Next INR check:  2022   Target end date:      Indications    Permanent atrial fibrillation ([facility] [address] [I48.21]  Long term current use of anticoagulant [Z79.01]                 Anticoagulation Episode Summary       INR check location:  Anticoagulation Clinic    Preferred lab:      Send INR reminders to:  [facility] CARDIOLOGY MELINDA GUILLERMO    Comments:            Anticoagulation Care Providers       Provider Role Specialty Phone number    Matthew Serrato MD [facility] Cardiology [phone]

## 2022-10-21 NOTE — PATIENT INSTRUCTIONS
Reminder: Please contact the Coumadin Clinic at [phone]  when you have medication changes. Examples, new medications, antibiotics, discontinued medications, new supplements, missed doses of warfarin or if you took extra doses of warfarin. This also includes OTC medications. Notifying us helps reduce the possibility of high and low INR's. In addition, if warfarin needs to be held for any procedures, please have surgeon or physician's office contact us before holding anticoagulant. Thanks, [address] Cardiology Coumadin Clinic.

## 2022-10-23 LAB — THYROGLOBULIN: 5.6 NG/ML

## 2022-10-24 ENCOUNTER — OFFICE VISIT (OUTPATIENT)
Dept: ENDOCRINOLOGY | Age: 87
End: 2022-10-24
Payer: MEDICARE

## 2022-10-24 VITALS
HEART RATE: 70 BPM | HEIGHT: 71 IN | SYSTOLIC BLOOD PRESSURE: 126 MMHG | BODY MASS INDEX: 25.76 KG/M2 | OXYGEN SATURATION: 99 % | DIASTOLIC BLOOD PRESSURE: 82 MMHG | WEIGHT: 184 LBS

## 2022-10-24 DIAGNOSIS — C73 FOLLICULAR THYROID CARCINOMA (HCC): Primary | ICD-10-CM

## 2022-10-24 DIAGNOSIS — E03.9 PRIMARY HYPOTHYROIDISM: ICD-10-CM

## 2022-10-24 DIAGNOSIS — C73 PAPILLARY THYROID CARCINOMA (HCC): ICD-10-CM

## 2022-10-24 DIAGNOSIS — I48.0 PAROXYSMAL ATRIAL FIBRILLATION (HCC): ICD-10-CM

## 2022-10-24 PROCEDURE — G8484 FLU IMMUNIZE NO ADMIN: HCPCS | Performed by: INTERNAL MEDICINE

## 2022-10-24 PROCEDURE — 1036F TOBACCO NON-USER: CPT | Performed by: INTERNAL MEDICINE

## 2022-10-24 PROCEDURE — 99214 OFFICE O/P EST MOD 30 MIN: CPT | Performed by: INTERNAL MEDICINE

## 2022-10-24 PROCEDURE — 1123F ACP DISCUSS/DSCN MKR DOCD: CPT | Performed by: INTERNAL MEDICINE

## 2022-10-24 PROCEDURE — G8417 CALC BMI ABV UP PARAM F/U: HCPCS | Performed by: INTERNAL MEDICINE

## 2022-10-24 PROCEDURE — G8428 CUR MEDS NOT DOCUMENT: HCPCS | Performed by: INTERNAL MEDICINE

## 2022-10-24 RX ORDER — LEVOTHYROXINE SODIUM 0.12 MG/1
125 TABLET ORAL
Qty: 90 TABLET | Refills: 3 | Status: SHIPPED | OUTPATIENT
Start: 2022-10-24

## 2022-10-24 ASSESSMENT — ENCOUNTER SYMPTOMS
CHOKING: 1
VOICE CHANGE: 0
TROUBLE SWALLOWING: 0

## 2022-10-24 NOTE — PROGRESS NOTES
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 [facility] [place] had MAMIE low risk thyroid cancer. As such, he is adequately treated with thyroidectomy without radioiodine therapy. Thyroglobulin has been in the expected range for someone who has undergone thyroidectomy but not received radioiodine. I will follow that over time. - Thyroglobulin Panel; Future    2. Papillary thyroid carcinoma (HCC)  - Thyroglobulin Panel; Future    3. Primary hypothyroidism (preceding thyroidectomy)  TSH target is the normal range. Continue levothyroxine as currently prescribed. - levothyroxine (SYNTHROID) 125 MCG tablet; Take 1 tablet by mouth every morning (before breakfast)  Dispense: 90 tablet; Refill: 3  - TSH; Future  - T4, Free; Future    4. Paroxysmal atrial fibrillation ([facility] [address]        Follow-up and Dispositions    Return in about 6 months (around 4/24/2023). History of Present Illness:    THYROID CANCER  Bijan Miles is seen in the [place] [address] for follow-up of thyroid cancer. Type of thyroid cancer: follicular thyroid cancer, microscopic papillary thyroid cancer     Date thyroid cancer diagnosed: 10/21/2019 on biopsy of a sonographically suspicious PET-positive thyroid nodule in the left lobe (cytology indeterminate (atypia of undetermined significance/Wilson category III; Afirma genomic sequencing  suspicious)     Date(s) of surgery: 2/3/2020 (Dr. 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 0.1 cm papillary carcinoma in the isthmus (uninvolved surgical margins). Background lymphocytic thyroiditis. No lymph nodes submitted.      AJCC stage: T1 - Tumor 2 cm or less in greatest dimension limited to the thyroid, NX - Regional lymph nodes cannot be assessed, MX - Distant metastasis cannot be assessed     Radioactive iodine treatment: none     Treatment of hypothyroidism: He was diagnosed with hypothyroidism in the 2000. He was started on levothyroxine 25 mcg daily at diagnosis. His dose has been adjusted as follows:  -50 mcg daily ~2015  -100 mcg daily 2/11/2020  -112 mcg daily 5/28/2020  -125 mcg daily 8/11/2020     Labs:  9/2/2015: TSH 5.630, T4 8.1.  2/25/2019: TSH 1.960.  2/3/2020: Calcium 9.3, intact parathyroid hormone 46.5.  2/4/2020: Calcium 9.9.  5/13/2020: TSH 10.160, free T4 1.28.  8/5/2020: TSH 5.820, free T4 1.1, thyroglobulin 3.5, thyroglobulin antibodies 89.4.  10/1/2020: TSH 1.330, free T4 1.3, thyroglobulin (JG) 4.1, thyroglobulin antibodies at 67.3.  4/7/2021: TSH 1.470, free T4 1.2, thyroglobulin (JG) 4.4, thyroglobulin antibodies 24.7.  10/18/2021: TSH 0.579, free T4 1.3, thyroglobulin (JG) 2.6, thyroglobulin antibodies 16.6.  4/19/2022: TSH 0.437, free T4 1.4, thyroglobulin (JG) 7.2, thyroglobulin antibodies 40.7.  10/13/2022: TSH 2.590, free T4 1.1, thyroglobulin (JG) 5.6, thyroglobulin antibodies 60.2     Imaging:   Before surgery:  ~2018: Ultrasound (Dr. Lyndsey Castillo)- result not available (no nodules per patient). 9/24/2019: PET/CT ([address] [place] [address])- 1.2 cm hypermetabolic (5.2 SUVs) nodule in the left thyroid lobe. No cervical lymphadenopathy. 10/21/2019: Ultrasound ([facility])- Right lobe 2.09 x 1.49 x 3.55 cm, isthmus 0.43 cm, left lobe 1.84 x 2.16 x 3.78 cm. Mildly heterogeneous echotexture. Normal blood flow. 0.61 x 0.54 x 0.64 cm hypoechoic nodule without calcifications or increased blood flow in the posterior midportion of the right lobe. 1.23 x 1.26 x 1.12 cm slightly hypoechoic nodule without calcifications or increased blood flow in the mid to lower portion of the left lobe.   No abnormal cervical lymph nodes.     Since surgery:  none     Current symptoms: See review of systems below     Family history of thyroid cancer:  No       Review of Systems   Constitutional:  Negative for fatigue and unexpected weight change. HENT:  Negative for trouble swallowing and voice change. Respiratory:  Positive for choking. Cardiovascular:  Negative for palpitations. /82 (Site: Left Upper Arm, Position: Sitting)   Pulse 70   Ht 5' 11\" (1.803 m)   Wt 184 lb (83.5 kg)   SpO2 99%   BMI 25.66 kg/m²   Wt Readings from Last 3 Encounters:   10/24/22 184 lb (83.5 kg)   07/29/22 184 lb (83.5 kg)   05/04/22 186 lb (84.4 kg)       Physical Exam  Constitutional:       Appearance: Normal appearance. HENT:      Head: Normocephalic. Neck:      Thyroid: No thyroid mass or thyromegaly. Comments: Healed thyroidectomy scar. No palpable neck masses. Cardiovascular:      Rate and Rhythm: Normal rate and regular rhythm. Pulmonary:      Effort: Pulmonary effort is normal.      Breath sounds: Normal breath sounds. Neurological:      Mental Status: He is alert.    Psychiatric:         Mood and Affect: Mood normal.         Behavior: Behavior normal.       Orders Placed This Encounter   Procedures    TSH     Standing Status:   Future     Standing Expiration Date:   10/24/2023    T4, Free     Standing Status:   Future     Standing Expiration Date:   10/24/2023    Thyroglobulin Panel     Standing Status:   Future     Standing Expiration Date:   10/24/2023         Current Outpatient Medications   Medication Sig Dispense Refill    levothyroxine (SYNTHROID) 125 MCG tablet Take 1 tablet by mouth every morning (before breakfast) 90 tablet 3    hydrALAZINE (APRESOLINE) 25 MG tablet TAKE 1 TABLET TWICE DAILY 180 tablet 3    dilTIAZem (DILACOR XR) 180 MG extended release capsule TAKE 1 CAPSULE TWICE DAILY 180 capsule 3    warfarin (COUMADIN) 1 MG tablet Take 1 tab as directed by [facility] Cardiololgy 40 tablet 0    warfarin (COUMADIN) 1 MG tablet Take 1 tablet by mouth in the morning. 40 tablet 2    warfarin (COUMADIN) 2.5 MG tablet Take 1 to 1.5 tabs by mouth, daily as directed by [facility] Cardiology 135 tablet 3    albuterol sulfate  (90 Base) MCG/ACT inhaler Inhale 2 puffs into the lungs every 4 hours as needed      dilTIAZem (TIAZAC) 180 MG extended release capsule TAKE 1 CAPSULE TWICE DAILY      esomeprazole (NEXIUM) 40 MG delayed release capsule Take by mouth daily      furosemide (LASIX) 40 MG tablet TAKE 1 TABLET TWICE DAILY      lisinopril (PRINIVIL;ZESTRIL) 40 MG tablet TAKE 1 TABLET TWICE DAILY      metoprolol succinate (TOPROL XL) 25 MG extended release tablet Take 25 mg by mouth daily      tamsulosin (FLOMAX) 0.4 MG capsule TAKE 1 CAPSULE EVERY MORNING       No current facility-administered medications for this visit. Julio Vallejo MD, FACE      Portions of this note were generated with the assistance of voice recognition software. As such, some errors in transcription may be present.

## 2022-11-04 ENCOUNTER — OFFICE VISIT (OUTPATIENT)
Dept: CARDIOLOGY CLINIC | Age: 87
End: 2022-11-04
Payer: MEDICARE

## 2022-11-04 ENCOUNTER — ANTI-COAG VISIT (OUTPATIENT)
Dept: CARDIOLOGY CLINIC | Age: 87
End: 2022-11-04
Payer: MEDICARE

## 2022-11-04 VITALS
HEIGHT: 71 IN | HEART RATE: 80 BPM | SYSTOLIC BLOOD PRESSURE: 136 MMHG | DIASTOLIC BLOOD PRESSURE: 80 MMHG | WEIGHT: 184 LBS | BODY MASS INDEX: 25.76 KG/M2

## 2022-11-04 DIAGNOSIS — I48.21 PERMANENT ATRIAL FIBRILLATION (HCC): Primary | ICD-10-CM

## 2022-11-04 DIAGNOSIS — Z79.01 LONG TERM (CURRENT) USE OF ANTICOAGULANTS: ICD-10-CM

## 2022-11-04 DIAGNOSIS — Z79.01 LONG TERM CURRENT USE OF ANTICOAGULANT: ICD-10-CM

## 2022-11-04 DIAGNOSIS — I48.21 PERMANENT ATRIAL FIBRILLATION (HCC): ICD-10-CM

## 2022-11-04 DIAGNOSIS — I10 ESSENTIAL HYPERTENSION: ICD-10-CM

## 2022-11-04 DIAGNOSIS — I25.10 CORONARY ARTERY DISEASE INVOLVING NATIVE CORONARY ARTERY OF NATIVE HEART WITHOUT ANGINA PECTORIS: ICD-10-CM

## 2022-11-04 DIAGNOSIS — Z95.0 CARDIAC PACEMAKER: Primary | ICD-10-CM

## 2022-11-04 LAB
POC INR: 2.8
PROTHROMBIN TIME, POC: YES

## 2022-11-04 PROCEDURE — 1123F ACP DISCUSS/DSCN MKR DOCD: CPT | Performed by: INTERNAL MEDICINE

## 2022-11-04 PROCEDURE — 85610 PROTHROMBIN TIME: CPT | Performed by: INTERNAL MEDICINE

## 2022-11-04 PROCEDURE — G8484 FLU IMMUNIZE NO ADMIN: HCPCS | Performed by: INTERNAL MEDICINE

## 2022-11-04 PROCEDURE — 1036F TOBACCO NON-USER: CPT | Performed by: INTERNAL MEDICINE

## 2022-11-04 PROCEDURE — 99214 OFFICE O/P EST MOD 30 MIN: CPT | Performed by: INTERNAL MEDICINE

## 2022-11-04 PROCEDURE — G8417 CALC BMI ABV UP PARAM F/U: HCPCS | Performed by: INTERNAL MEDICINE

## 2022-11-04 PROCEDURE — G8427 DOCREV CUR MEDS BY ELIG CLIN: HCPCS | Performed by: INTERNAL MEDICINE

## 2022-11-04 ASSESSMENT — ENCOUNTER SYMPTOMS
DIARRHEA: 0
ABDOMINAL PAIN: 0
BOWEL INCONTINENCE: 0
HOARSE VOICE: 0
HEMATOCHEZIA: 0
COLOR CHANGE: 0
SPUTUM PRODUCTION: 0
WHEEZING: 0
BLURRED VISION: 0
HEMATEMESIS: 0
SHORTNESS OF BREATH: 0
ORTHOPNEA: 0

## 2022-11-04 NOTE — PROGRESS NOTES
Anticoagulation Summary  As of 2022      INR goal:  2.0-3.0   TTR:  41.0 % (5.1 mo)   INR used for dosin.8 (2022)   Warfarin maintenance plan:  3.5 mg (1 mg x 1 and 2.5 mg x 1) every Mon, Wed, Fri; 2.5 mg (2.5 mg x 1) all other days; Starting 2022   Weekly warfarin total:  20.5 mg   Plan last modified:  Diamond Traylor MA (2022)   Next INR check:  2022   Target end date:      Indications    Permanent atrial fibrillation ([facility] [address] [I48.21]  Long term current use of anticoagulant [Z79.01]                 Anticoagulation Episode Summary       INR check location:  Anticoagulation Clinic    Preferred lab:      Send INR reminders to:  [facility] CARDIOLOGY MELINDA GUILLERMO    Comments:            Anticoagulation Care Providers       Provider Role Specialty Phone number    Donna Russo MD [facility] Cardiology [phone]

## 2022-11-04 NOTE — PROGRESS NOTES
[facility] CARDIOLOGY  [address], [address], [address] [place] [address]  PHONE: [phone]        22        NAME:  Ina Alan  : 1935  MRN: [id number]       SUBJECTIVE:   Ina Alan is a 80 y.o. male seen for a follow up visit regarding the following: AF,SSS with pacemaker,CAD,and primary hypertension. He returns for scheduled follow up. Paco Smart reports doing very well. Chief Complaint   Patient presents with    Atrial Fibrillation     6 month follow up       HPI:    Atrial Fibrillation  Presents for follow-up visit. Symptoms include chest pain (rare sharp pain that lasts<1 second). Symptoms are negative for an AICD problem, bradycardia, dizziness, hemodynamic instability, hypertension, hypotension, pacemaker problem, palpitations, shortness of breath, syncope, tachycardia and weakness. The symptoms have been stable. Past Medical History, Past Surgical History, Family history, Social History, and Medications were all reviewed with the patient today and updated as necessary.          Current Outpatient Medications:     Multiple Vitamin (MULTIVITAMIN ADULT PO), Take by mouth daily, Disp: , Rfl:     levothyroxine (SYNTHROID) 125 MCG tablet, Take 1 tablet by mouth every morning (before breakfast), Disp: 90 tablet, Rfl: 3    hydrALAZINE (APRESOLINE) 25 MG tablet, TAKE 1 TABLET TWICE DAILY, Disp: 180 tablet, Rfl: 3    dilTIAZem (DILACOR XR) 180 MG extended release capsule, TAKE 1 CAPSULE TWICE DAILY, Disp: 180 capsule, Rfl: 3    warfarin (COUMADIN) 1 MG tablet, Take 1 tab as directed by [facility] Cardiololgy, Disp: 40 tablet, Rfl: 0    warfarin (COUMADIN) 1 MG tablet, Take 1 tablet by mouth in the morning., Disp: 40 tablet, Rfl: 2    warfarin (COUMADIN) 2.5 MG tablet, Take 1 to 1.5 tabs by mouth, daily as directed by [facility] Cardiology, Disp: 135 tablet, Rfl: 3    albuterol sulfate  (90 Base) MCG/ACT inhaler, Inhale 2 puffs into the lungs every 4 hours as needed, Disp: , Rfl:     esomeprazole (NEXIUM) 40 MG delayed release capsule, Take by mouth daily, Disp: , Rfl:     furosemide (LASIX) 40 MG tablet, TAKE 1 TABLET TWICE DAILY, Disp: , Rfl:     lisinopril (PRINIVIL;ZESTRIL) 40 MG tablet, TAKE 1 TABLET TWICE DAILY, Disp: , Rfl:     metoprolol succinate (TOPROL XL) 25 MG extended release tablet, Take 25 mg by mouth daily, Disp: , Rfl:     tamsulosin (FLOMAX) 0.4 MG capsule, TAKE 1 CAPSULE EVERY MORNING, Disp: , Rfl:   Allergies   Allergen Reactions    Hydrocodone-Acetaminophen Other (See Comments)     Severe restlessness    Oxycodone-Acetaminophen Nausea And Vomiting     Past Medical History:   Diagnosis Date    Arthritis     Atrial fibrillation (HCC)     Coronary artery disease     [id number]     Follicular thyroid carcinoma (HCC)     Hypertension     Incarcerated ventral hernia 01/28/2016    s/p ventral hernia repair (no mesh); Dr. Brenda Nam of [facility]) 2010    Microscopic hematuria     Obstructive sleep apnea on CPAP     Papillary thyroid carcinoma (HCC)     Peptic ulcer disease     Primary hypothyroidism     Reflux esophagitis 2/23/2018    Transient ischemic attack (TIA) 10/31/2008     Past Surgical History:   Procedure Laterality Date    CARDIAC CATHETERIZATION  1995    angioplasty    HEMORRHOID SURGERY  1990s    HERNIA REPAIR Right 1990s    HERNIA REPAIR  01/2016    Ventral hernia (Dr. Jelly Harris)    MALIGNANT SKIN LESION EXCISION      2012    MOHS SURGERY  early 2000's    Right side RCR    OTHER SURGICAL HISTORY Left 08/12/2020    Basal cell surgery with skin graft left nose.   Dr. Gabino Marr  01/19/2009    [employer] pacemaker     PACEMAKER  2018    replacement    THYROIDECTOMY  02/03/2020    Total thyroidectomy (Dr. Alicja Brewer)     Family History   Problem Relation Age of Onset    Stroke Sister     Hypertension Mother     Thyroid Cancer Neg Hx     Thyroid Disease Daughter         benign nodules    Breast Cancer Daughter         breast sarcoma    Breast Cancer Sister     Cancer Sister         leukemia    Stroke Mother     Breast Cancer Sister       Social History     Tobacco Use    Smoking status: Former     Packs/day: 2.00     Types: Cigarettes     Quit date: 1969     Years since quittin.8    Smokeless tobacco: Never   Substance Use Topics    Alcohol use: No       ROS:    Review of Systems   Constitutional: Negative for chills, decreased appetite, diaphoresis, fever and malaise/fatigue. HENT:  Negative for congestion, hearing loss, hoarse voice and nosebleeds. Eyes:  Negative for blurred vision. Cardiovascular:  Positive for chest pain (rare sharp pain that lasts<1 second). Negative for claudication, cyanosis, dyspnea on exertion, irregular heartbeat, leg swelling, near-syncope, orthopnea, palpitations, paroxysmal nocturnal dyspnea and syncope. Respiratory:  Negative for shortness of breath, sputum production and wheezing. Endocrine: Negative for polydipsia, polyphagia and polyuria. Skin:  Negative for color change. Gastrointestinal:  Negative for abdominal pain, bowel incontinence, diarrhea, hematemesis and hematochezia. Genitourinary:  Negative for dysuria, frequency and hematuria. Neurological:  Negative for dizziness, focal weakness, light-headedness, loss of balance, numbness, sensory change and weakness. Psychiatric/Behavioral:  Negative for altered mental status and memory loss. PHYSICAL EXAM:   /80   Pulse 80   Ht 5' 11\" (1.803 m)   Wt 184 lb (83.5 kg)   BMI 25.66 kg/m²      Physical Exam  Constitutional:       Appearance: Normal appearance. HENT:      Head: Normocephalic and atraumatic. Nose: Nose normal.   Eyes:      Extraocular Movements: Extraocular movements intact. Pupils: Pupils are equal, round, and reactive to light. Neck:      Vascular: No carotid bruit. Cardiovascular:      Rate and Rhythm: Regular rhythm. Pulses: Normal pulses.       Heart sounds: No murmur heard.  Pulmonary:      Effort: Pulmonary effort is normal.      Breath sounds: Normal breath sounds. Abdominal:      General: Abdomen is flat. Bowel sounds are normal.      Palpations: Abdomen is soft. Musculoskeletal:         General: Normal range of motion. Cervical back: Normal range of motion and neck supple. Skin:     General: Skin is warm and dry. Neurological:      General: No focal deficit present. Mental Status: He is alert and oriented to person, place, and time. Psychiatric:         Mood and Affect: Mood normal.       Medical problems and test results were reviewed with the patient today.      Recent Results (from the past 672 hour(s))   PT/INR (Resulted at [facility]/in-office AND billed by [facility])    Collection Time: 10/12/22  2:32 PM   Result Value Ref Range    Prothrombin time, POC yes     POC INR 2.3    T4, Free    Collection Time: 10/13/22  1:18 PM   Result Value Ref Range    T4 Free 1.1 0.78 - 1.46 NG/DL   Thyroglobulin Panel    Collection Time: 10/13/22  1:18 PM   Result Value Ref Range    Thyroglobulin Ab 60.2 (H) 0.0 - 0.9 IU/mL   TSH    Collection Time: 10/13/22  1:18 PM   Result Value Ref Range    TSH, 3RD GENERATION 2.590 0.358 - 3.740 uIU/mL   Thyroglobulin (TG-JG)    Collection Time: 10/13/22  1:18 PM   Result Value Ref Range    Thyroglobulin 5.6 ng/mL   PT/INR (Resulted at [facility]/in-office AND billed by [facility])    Collection Time: 10/21/22  3:12 PM   Result Value Ref Range    Prothrombin time, POC yes     POC INR 2.8    PT/INR (Resulted at [facility]/in-office AND billed by [facility])    Collection Time: 11/04/22 10:35 AM   Result Value Ref Range    Prothrombin time, POC yes     POC INR 2.8      No results found for: CHOL, CHOLPOCT, CHOLX, CHLST, CHOLV, HDL, HDLPOC, HDLC, LDL, LDLC, VLDLC, VLDL, TGLX, TRIGL  Results for orders placed or performed in visit on 11/04/22   PT/INR (Resulted at [facility]/in-office AND billed by [facility])   Result Value Ref Range    Prothrombin time, POC yes     POC INR 2.8

## 2022-11-07 ENCOUNTER — OFFICE VISIT (OUTPATIENT)
Dept: UROLOGY | Age: 87
End: 2022-11-07
Payer: MEDICARE

## 2022-11-07 DIAGNOSIS — N40.1 BENIGN PROSTATIC HYPERPLASIA WITH LOWER URINARY TRACT SYMPTOMS, SYMPTOM DETAILS UNSPECIFIED: Primary | ICD-10-CM

## 2022-11-07 LAB
BILIRUBIN, URINE, POC: NEGATIVE
BLOOD URINE, POC: NEGATIVE
GLUCOSE URINE, POC: NEGATIVE
KETONES, URINE, POC: NEGATIVE
LEUKOCYTE ESTERASE, URINE, POC: NEGATIVE
NITRITE, URINE, POC: NEGATIVE
PH, URINE, POC: 5.5 (ref 4.6–8)
PROTEIN,URINE, POC: NEGATIVE
SPECIFIC GRAVITY, URINE, POC: 1.01 (ref 1–1.03)
URINALYSIS CLARITY, POC: NORMAL
URINALYSIS COLOR, POC: NORMAL
UROBILINOGEN, POC: 0.2

## 2022-11-07 PROCEDURE — 1123F ACP DISCUSS/DSCN MKR DOCD: CPT | Performed by: UROLOGY

## 2022-11-07 PROCEDURE — G8427 DOCREV CUR MEDS BY ELIG CLIN: HCPCS | Performed by: UROLOGY

## 2022-11-07 PROCEDURE — G8417 CALC BMI ABV UP PARAM F/U: HCPCS | Performed by: UROLOGY

## 2022-11-07 PROCEDURE — G8484 FLU IMMUNIZE NO ADMIN: HCPCS | Performed by: UROLOGY

## 2022-11-07 PROCEDURE — 1036F TOBACCO NON-USER: CPT | Performed by: UROLOGY

## 2022-11-07 PROCEDURE — 81003 URINALYSIS AUTO W/O SCOPE: CPT | Performed by: UROLOGY

## 2022-11-07 PROCEDURE — 99213 OFFICE O/P EST LOW 20 MIN: CPT | Performed by: UROLOGY

## 2022-11-07 RX ORDER — TAMSULOSIN HYDROCHLORIDE 0.4 MG/1
0.4 CAPSULE ORAL DAILY
Qty: 90 CAPSULE | Refills: 3 | Status: SHIPPED | OUTPATIENT
Start: 2022-11-07

## 2022-11-07 NOTE — PROGRESS NOTES
[facility] Urology  [address]    [place] [address], [address] [facility]  [phone]          Katherine Wheeler  : 1935    No chief complaint on file. HPI     Katherine Wheeler is a 80 y.o. male followed previously by Dr. Evelina Rosas in regards to stable complex b renal cysts and BPH. He is on tamsulosin. LUTS are controlled with tamsulosin. Last ultrasound in  revealed stability of cysts with no further imaging needed. He has no complaint today. Past Medical History:   Diagnosis Date    Arthritis     Atrial fibrillation ([address]     Coronary artery disease     OEZOS-46     Follicular thyroid carcinoma ([facility] [address]     Hypertension     Incarcerated ventral hernia 2016    s/p ventral hernia repair (no mesh); Dr. Nicole Patches of [facility]) 2010    Microscopic hematuria     Obstructive sleep apnea on CPAP     Papillary thyroid carcinoma ([facility] [address]     Peptic ulcer disease     Primary hypothyroidism     Reflux esophagitis 2018    Transient ischemic attack (TIA) 10/31/2008     Past Surgical History:   Procedure Laterality Date    CARDIAC CATHETERIZATION      angioplasty    HEMORRHOID SURGERY      HERNIA REPAIR Right 1990s    HERNIA REPAIR  2016    Ventral hernia (Dr. Gong Foot)    MALIGNANT SKIN LESION EXCISION          MOHS SURGERY  early 's    Right side RCR    OTHER SURGICAL HISTORY Left 2020    Basal cell surgery with skin graft left nose.   Dr. Rebollar Neither  2009    [employer] pacemaker     PACEMAKER  2018    replacement    THYROIDECTOMY  2020    Total thyroidectomy (Dr. Anjelica Ritchie)     Current Outpatient Medications   Medication Sig Dispense Refill    tamsulosin (FLOMAX) 0.4 MG capsule Take 1 capsule by mouth daily TAKE 1 CAPSULE EVERY MORNING 90 capsule 3    Multiple Vitamin (MULTIVITAMIN ADULT PO) Take by mouth daily      levothyroxine (SYNTHROID) 125 MCG tablet Take 1 tablet by mouth every morning (before breakfast) 90 tablet 3    hydrALAZINE (APRESOLINE) 25 MG tablet TAKE 1 TABLET TWICE DAILY 180 tablet 3    dilTIAZem (DILACOR XR) 180 MG extended release capsule TAKE 1 CAPSULE TWICE DAILY 180 capsule 3    warfarin (COUMADIN) 1 MG tablet Take 1 tab as directed by [facility] Cardiololgy 40 tablet 0    warfarin (COUMADIN) 1 MG tablet Take 1 tablet by mouth in the morning. 40 tablet 2    warfarin (COUMADIN) 2.5 MG tablet Take 1 to 1.5 tabs by mouth, daily as directed by [facility] Cardiology 135 tablet 3    albuterol sulfate  (90 Base) MCG/ACT inhaler Inhale 2 puffs into the lungs every 4 hours as needed      esomeprazole (NEXIUM) 40 MG delayed release capsule Take by mouth daily      furosemide (LASIX) 40 MG tablet TAKE 1 TABLET TWICE DAILY      lisinopril (PRINIVIL;ZESTRIL) 40 MG tablet TAKE 1 TABLET TWICE DAILY      metoprolol succinate (TOPROL XL) 25 MG extended release tablet Take 25 mg by mouth daily       No current facility-administered medications for this visit. Allergies   Allergen Reactions    Hydrocodone-Acetaminophen Other (See Comments)     Severe restlessness    Oxycodone-Acetaminophen Nausea And Vomiting     Social History     Socioeconomic History    Marital status:      Spouse name: Not on file    Number of children: Not on file    Years of education: Not on file    Highest education level: Not on file   Occupational History    Not on file   Tobacco Use    Smoking status: Former     Packs/day: 2.00     Types: Cigarettes     Quit date: 1969     Years since quittin.8    Smokeless tobacco: Never   Substance and Sexual Activity    Alcohol use: No    Drug use: No    Sexual activity: Not on file   Other Topics Concern    Not on file   Social History Narrative    The patient has always lived in this general area. He formerly worked in a [employer]. He has no known exposure to TB. There is no significant environmental or industrial exposure. He is  and living with his wife. Social Determinants of Health     Financial Resource Strain: Not on file   Food Insecurity: Not on file   Transportation Needs: Not on file   Physical Activity: Not on file   Stress: Not on file   Social Connections: Not on file   Intimate Partner Violence: Not on file   Housing Stability: Not on file     Family History   Problem Relation Age of Onset    Stroke Sister     Hypertension Mother     Thyroid Cancer Neg Hx     Thyroid Disease Daughter         benign nodules    Breast Cancer Daughter         breast sarcoma    Breast Cancer Sister     Cancer Sister         leukemia    Stroke Mother     Breast Cancer Sister        ROS    Urinalysis  UA - Dipstick  Results for orders placed or performed in visit on 11/07/22   AMB POC URINALYSIS DIP STICK AUTO W/O MICRO   Result Value Ref Range    Color, Urine, POC      Clarity, Urine, POC      Glucose, Urine, POC Negative Negative    Bilirubin, Urine, POC Negative Negative    Ketones, Urine, POC Negative Negative    Specific Gravity, Urine, POC 1.015 1.001 - 1.035    Blood, Urine, POC Negative Negative    pH, Urine, POC 5.5 4.6 - 8.0    Protein, Urine, POC Negative Negative    Urobilinogen, POC 0.2     Nitrate, Urine, POC Negative Negative    Leukocyte Esterase, Urine, POC Negative Negative       There were no vitals taken for this visit. GENERAL: NAD, ALERT, A&O x 3, GAIT NORMAL  LUNGS: easy work of breathing  ABDOMEN: soft, non tender  KALI: 2+ L > R, no nodule  NEUROLOGIC: cranial nerves 2-12 grossly intact           Assessment and Plan    ICD-10-CM    1. Benign prostatic hyperplasia with lower urinary tract symptoms, symptom details unspecified  N40.1 AMB POC URINALYSIS DIP STICK AUTO W/O MICRO     tamsulosin (FLOMAX) 0.4 MG capsule          Tamsulosin was refilled. I will see him in 1 year for kali/refill. I will see his wife same day.

## 2022-11-15 DIAGNOSIS — I49.5 SSS (SICK SINUS SYNDROME) (HCC): ICD-10-CM

## 2022-11-15 DIAGNOSIS — Z95.0 CARDIAC PACEMAKER: Primary | ICD-10-CM

## 2022-11-15 DIAGNOSIS — I48.21 PERMANENT ATRIAL FIBRILLATION (HCC): ICD-10-CM

## 2022-12-02 ENCOUNTER — ANTI-COAG VISIT (OUTPATIENT)
Dept: CARDIOLOGY CLINIC | Age: 87
End: 2022-12-02
Payer: MEDICARE

## 2022-12-02 DIAGNOSIS — I48.21 PERMANENT ATRIAL FIBRILLATION (HCC): Primary | ICD-10-CM

## 2022-12-02 DIAGNOSIS — Z79.01 LONG TERM CURRENT USE OF ANTICOAGULANT: ICD-10-CM

## 2022-12-02 DIAGNOSIS — Z79.01 LONG TERM (CURRENT) USE OF ANTICOAGULANTS: ICD-10-CM

## 2022-12-02 LAB
POC INR: 2.8
PROTHROMBIN TIME, POC: YES

## 2022-12-02 PROCEDURE — 93793 ANTICOAG MGMT PT WARFARIN: CPT | Performed by: INTERNAL MEDICINE

## 2022-12-02 PROCEDURE — 85610 PROTHROMBIN TIME: CPT | Performed by: INTERNAL MEDICINE

## 2022-12-02 NOTE — PROGRESS NOTES
Anticoagulation Summary  As of 2022      INR goal:  2.0-3.0   TTR:  50.1 % (6.1 mo)   INR used for dosin.8 (2022)   Warfarin maintenance plan:  3.5 mg (1 mg x 1 and 2.5 mg x 1) every Mon, Wed, Fri; 2.5 mg (2.5 mg x 1) all other days; Starting 2022   Weekly warfarin total:  20.5 mg   Plan last modified:  Ghassan Mir MA (2022)   Next INR check:  2022   Target end date:      Indications    Permanent atrial fibrillation ([facility] [address] [I48.21]  Long term current use of anticoagulant [Z79.01]                 Anticoagulation Episode Summary       INR check location:  Anticoagulation Clinic    Preferred lab:      Send INR reminders to:  [facility] CARDIOLOGY MELINDA GUILLERMO    Comments:            Anticoagulation Care Providers       Provider Role Specialty Phone number    Lola Escalante MD [facility] Medicine [phone]

## 2022-12-02 NOTE — PATIENT INSTRUCTIONS
Reminder: Please contact the Coumadin Clinic at [phone]  when you have medication changes. Examples, new medications, antibiotics, discontinued medications, new supplements, missed doses of warfarin or if you took extra doses of warfarin. This also includes OTC medications. Notifying us helps reduce the possibility of high and low INR's. In addition, if warfarin needs to be held for any procedures, please have surgeon or physician's office contact us before holding anticoagulant. Thanks, [facility] Cardiology Coumadin Clinic.

## 2022-12-29 DIAGNOSIS — E03.9 PRIMARY HYPOTHYROIDISM: ICD-10-CM

## 2022-12-29 RX ORDER — METOPROLOL SUCCINATE 25 MG/1
TABLET, EXTENDED RELEASE ORAL
Qty: 90 TABLET | Refills: 3 | Status: SHIPPED | OUTPATIENT
Start: 2022-12-29

## 2022-12-30 ENCOUNTER — ANTI-COAG VISIT (OUTPATIENT)
Dept: CARDIOLOGY CLINIC | Age: 87
End: 2022-12-30

## 2022-12-30 DIAGNOSIS — Z79.01 LONG TERM (CURRENT) USE OF ANTICOAGULANTS: ICD-10-CM

## 2022-12-30 DIAGNOSIS — I48.21 PERMANENT ATRIAL FIBRILLATION (HCC): Primary | ICD-10-CM

## 2022-12-30 DIAGNOSIS — Z79.01 LONG TERM CURRENT USE OF ANTICOAGULANT: ICD-10-CM

## 2022-12-30 LAB
POC INR: 3.6
PROTHROMBIN TIME, POC: YES

## 2022-12-30 RX ORDER — LEVOTHYROXINE SODIUM 0.12 MG/1
TABLET ORAL
Qty: 90 TABLET | Refills: 3 | OUTPATIENT
Start: 2022-12-30

## 2022-12-30 NOTE — PROGRESS NOTES
Pt is above range, pt denies any diet or med  changes, will decrease dose and recheck in 2 weeks//KM  Anticoagulation Summary  As of 12/30/2022      INR goal:  2.0-3.0   TTR:  46.7 % (7 mo)   INR used for dosing:  3.6 (12/30/2022)   Warfarin maintenance plan:  3.5 mg (1 mg x 1 and 2.5 mg x 1) every Mon, Wed, Fri; 2.5 mg (2.5 mg x 1) all other days; Starting 12/30/2022   Weekly warfarin total:  20.5 mg   Plan last modified:  Yuko Shen MA (9/26/2022)   Next INR check:     Target end date:      Indications    Permanent atrial fibrillation ([facility] [address] [I48.21]  Long term current use of anticoagulant [Z79.01]                 Anticoagulation Episode Summary       INR check location:  Anticoagulation Clinic    Preferred lab:      Send INR reminders to:  [facility] CARDIOLOGY MELINDA PT    Comments:            Anticoagulation Care Providers       Provider Role Specialty Phone number    Lashawn Woo MD [facility] Cardiology [phone]

## 2022-12-30 NOTE — PATIENT INSTRUCTIONS
Reminder: Please contact the Coumadin Clinic at [phone]  when you have medication changes. Examples, new medications, antibiotics, discontinued medications, new supplements, missed doses of warfarin or if you took extra doses of warfarin. This also includes OTC medications. Notifying us helps reduce the possibility of high and low INR's. In addition, if warfarin needs to be held for any procedures, please have surgeon or physician's office contact us before holding anticoagulant. Thanks, [facility] Cardiology Coumadin Clinic. Cedric Mayer

## 2023-01-20 ENCOUNTER — ANTI-COAG VISIT (OUTPATIENT)
Dept: CARDIOLOGY CLINIC | Age: 88
End: 2023-01-20

## 2023-01-20 DIAGNOSIS — Z79.01 LONG TERM CURRENT USE OF ANTICOAGULANT: ICD-10-CM

## 2023-01-20 DIAGNOSIS — I48.21 PERMANENT ATRIAL FIBRILLATION (HCC): Primary | ICD-10-CM

## 2023-01-20 DIAGNOSIS — Z79.01 LONG TERM (CURRENT) USE OF ANTICOAGULANTS: ICD-10-CM

## 2023-01-20 LAB
POC INR: 2.7
PROTHROMBIN TIME, POC: YES

## 2023-01-20 NOTE — PROGRESS NOTES
Anticoagulation Summary  As of 2023      INR goal:  2.0-3.0   TTR:  45.4 % (7.7 mo)   INR used for dosin.7 (2023)   Warfarin maintenance plan:  3.5 mg (1 mg x 1 and 2.5 mg x 1) every Mon, Wed, Fri; 2.5 mg (2.5 mg x 1) all other days; Starting 2023   Weekly warfarin total:  20.5 mg   Plan last modified:  Kezia Moore MA (2022)   Next INR check:  2023   Target end date:      Indications    Permanent atrial fibrillation ([facility] [address] [I48.21]  Long term current use of anticoagulant [Z79.01]                 Anticoagulation Episode Summary       INR check location:  Anticoagulation Clinic    Preferred lab:      Send INR reminders to:  [facility] CARDIOLOGY MELINDA GUILLERMO    Comments:            Anticoagulation Care Providers       Provider Role Specialty Phone number    Phillip Dakins, MD [facility] Cardiology [phone]

## 2023-01-20 NOTE — PATIENT INSTRUCTIONS
Reminder: Please contact the Coumadin Clinic at [phone]  when you have medication changes. Examples, new medications, antibiotics, discontinued medications, new supplements, missed doses of warfarin or if you took extra doses of warfarin. This also includes OTC medications. Notifying us helps reduce the possibility of high and low INR's. In addition, if warfarin needs to be held for any procedures, please have surgeon or physician's office contact us before holding anticoagulant. Thanks, [facility] Cardiology Coumadin Clinic.

## 2023-01-23 ENCOUNTER — HOSPITAL ENCOUNTER (OUTPATIENT)
Dept: CT IMAGING | Age: 88
Discharge: HOME | End: 2023-01-26
Payer: MEDICARE

## 2023-01-23 DIAGNOSIS — R91.1 LUNG NODULE: ICD-10-CM

## 2023-01-23 PROCEDURE — 71250 CT THORAX DX C-: CPT

## 2023-01-25 NOTE — RESULT ENCOUNTER NOTE
Can we let the patient know that his CT scan was stable. Planned to see him one more time before stopping scans. Do not see appt. Can we get that set up for him. Thanks.      Eduardo Quarles MD

## 2023-02-01 ENCOUNTER — TELEPHONE (OUTPATIENT)
Dept: PULMONOLOGY | Age: 88
End: 2023-02-01

## 2023-02-01 NOTE — TELEPHONE ENCOUNTER
----- Message from Shantell East MD sent at 1/25/2023  5:31 PM EST -----  Can we let the patient know that his CT scan was stable. Planned to see him one more time before stopping scans. Do not see appt. Can we get that set up for him. Thanks.      Shantell East MD

## 2023-02-01 NOTE — TELEPHONE ENCOUNTER
Spoke with the patient's spouse (Teri Schaefer) in regards to their CT scan results, explained per Dr. Wiliam Vasquez that the CT scan was stable and that he would like for him to have an appointment scheduled one more time before he stops any scans. Teri Schaefer was agreeable with this and the patient is scheduled to see Dr. Wiliam Vasquez on 02/24/23 @ 2:00 pm.  Teri Schaefer understood the results and will notify the patient. No further questions or concerns were asked at this time. // [place] Fake. A.

## 2023-02-16 ENCOUNTER — OFFICE VISIT (OUTPATIENT)
Dept: PULMONOLOGY | Age: 88
End: 2023-02-16
Payer: MEDICARE

## 2023-02-16 VITALS
BODY MASS INDEX: 26.88 KG/M2 | TEMPERATURE: 97.2 F | WEIGHT: 192 LBS | HEART RATE: 71 BPM | HEIGHT: 71 IN | OXYGEN SATURATION: 98 % | SYSTOLIC BLOOD PRESSURE: 136 MMHG | DIASTOLIC BLOOD PRESSURE: 74 MMHG

## 2023-02-16 DIAGNOSIS — J45.20 MILD INTERMITTENT ASTHMA, UNCOMPLICATED: ICD-10-CM

## 2023-02-16 DIAGNOSIS — R91.1 SOLITARY PULMONARY NODULE: Primary | ICD-10-CM

## 2023-02-16 PROCEDURE — 1123F ACP DISCUSS/DSCN MKR DOCD: CPT | Performed by: INTERNAL MEDICINE

## 2023-02-16 PROCEDURE — 99213 OFFICE O/P EST LOW 20 MIN: CPT | Performed by: INTERNAL MEDICINE

## 2023-02-16 PROCEDURE — G8484 FLU IMMUNIZE NO ADMIN: HCPCS | Performed by: INTERNAL MEDICINE

## 2023-02-16 PROCEDURE — G8417 CALC BMI ABV UP PARAM F/U: HCPCS | Performed by: INTERNAL MEDICINE

## 2023-02-16 PROCEDURE — G8427 DOCREV CUR MEDS BY ELIG CLIN: HCPCS | Performed by: INTERNAL MEDICINE

## 2023-02-16 PROCEDURE — 1036F TOBACCO NON-USER: CPT | Performed by: INTERNAL MEDICINE

## 2023-02-16 NOTE — PROGRESS NOTES
Name:  Camden Song  YOB: 1935   MRN: [id number]      Office Visit: 2/16/2023        ASSESSMENT AND PLAN:  (Medical Decision Making)    Impression: Pt with mild intermittent asthma, not currently requiring any therapy. Small incidentally discovered lung nodules have now been stable for 2 years or longer. Will stop following these, no further chest imaging required. With no active pulmonary issues we will see him on a prn basis. There are no diagnoses linked to this encounter. No orders of the defined types were placed in this encounter. No orders of the defined types were placed in this encounter. Hannah Martinez MD    No specialty comments available. Total time for encounter on day of encounter was 20 minutes. This time includes chart prep, review of tests/procedures, review of other provider's notes, documentation and counseling patient regarding disease process and medications. _________________________________________________________________________    HISTORY OF PRESENT ILLNESS:    Mr. Shira Paula is a 80 y.o. male who is seen at [facility] today for  Follow-up   He has a history of asthma and lung nodules. PCP performed CT with PE protocol September 2019 due to worsening SOB. Several small nodules up to 9mm in [place]. PET 9/24/19 with no activity. Repeat CT Dec 2020 with 2 new small nodules. Last seen Jan 2022 and repeat CT was stable. Plan was to bring him back in 1 year. This again was stable. Today he states     REVIEW OF SYSTEMS: 10 point review of systems is negative except as reported in HPI. PHYSICAL EXAM: Body mass index is 26.78 kg/m². Vitals:    02/16/23 1559   BP: 136/74   Pulse: 71   Temp: 97.2 °F (36.2 °C)   TempSrc: Skin   SpO2: 98%   Weight: 192 lb (87.1 kg)   Height: 5' 11\" (1.803 m)         General:   Alert, cooperative, no distress, appears stated age. Eyes:   Conjunctivae/corneas clear.  PERRL Mouth/Throat:  Lips, mucosa, and tongue normal. Teeth and gums normal.        Lungs:     CTA B, no w/r/r     Heart:   Regular rate and rhythm, S1, S2 normal, no murmur, click, rub or gallop.     Abdomen:    Soft, non-tender.     Extremities:  Extremities normal, atraumatic, no cyanosis or edema.     Skin:  Skin color normal. No rashes or lesions     Neurologic:  A&Ox3     DIAGNOSTIC TESTS:                                                                                    LABS:   Lab Results   Component Value Date/Time    HGB 12.5 01/29/2020 04:37 PM    TSH 0.437 04/19/2022 08:55 AM     Imaging: I performed an independent interpretation of the patient's images.  CXR:   XR HIP 2-3 VW W PELVIS RIGHT 11/08/2021    Narrative  AUTOMATIC ADMINISTRATIVE RESULT    The result for this exam can be found in the Progress note in the chart.    Impression  :  See Progress note in the chart.    CT Chest:   CT CHEST WO CONTRAST 01/23/2023    Narrative  CT of the chest without contrast.    CLINICAL INDICATION: Pulmonary nodules, followup examination.    PROCEDURE: Serial thin section axial images are obtained from the thoracic inlet  through the upper abdomen without the administration of intravenous contrast.  Radiation dose reduction techniques were used for this study. Our CT scanners  use one or all of the following: Automated exposure control, adjusted of the mA  and/or kV according to patient size, iterative reconstruction    COMPARISON: Chest CT dated 12/28/2021 and 12/3/2020    FINDINGS:    No mediastinal or axillary adenopathy. There is no pleural or pericardial  effusion. Atherosclerotic calcifications are noted in the aorta. There is no  pneumothorax. No airspace consolidation identified. Mild emphysematous changes  again evident. The 2 mm pulmonary nodule in the subpleural right upper lobe on  image number 109 is stable. The tiny 2 mm pulmonary nodule in the subpleural  left upper lobe on image number 106 is stable. No  new suspicious pulmonary  nodules appreciated. Limited evaluation of the upper abdomen is unremarkable. No aggressive bone lesions identified. Impression  1. Stable bilateral pulmonary nodules measuring 2 mm. These nodules have been  stable for greater than two years and should be considered benign. 2. No acute cardiopulmonary abnormality. 3. Mild emphysema    Nuclear Medicine:   PET CT SKULL BASE TO MID THIGH 09/24/2019    Narrative  PET/CT    INDICATION: Lung nodule    TECHNIQUE: After oral administration of gastroview and intravenous  administration of 16.9 mCi of F18 FDG, noncontrast CT images were obtained for  attenuation correction and for fusion with emission PET images. A series of  overlapping emission PET images were then obtained beginning 60 minutes after  injection of FDG. The area imaged spanned the region from the skull base to the  mid thighs. COMPARISON: Chest CT dated 09/03/2019    FINDINGS:    Head/Neck: There is a hypermetabolic 12 mm nodule in the left lobe of the  thyroid. It measures 5.2 SUV Max. No other areas of abnormal uptake are seen  in the neck. There is no significant adenopathy. Chest: The 8mm nodule in the left upper lobe on image 75 shows only slight  uptake above background, 1.2 SUV Max. No hypermetabolic lesions are seen in the  lungs. There is no significant adenopathy. Abdomen: There are no hypermetabolic lesions in the liver or adrenal glands. There is no significant abdominal adenopathy. There are multiple cysts in both  kidneys. Pelvis: There is no significant abnormal uptake in the pelvis. There is no  significant adenopathy. There are no bony lesions. Sigmoid diverticulosis is  incidentally noted. Impression  IMPRESSION:  1. The small left upper lobe lung nodule shows only faint uptake above  background, but it may be below PET sensitivity limits due to its size. Suggest  3 month follow-up CT.   2.  Small hypermetabolic nodule in the left lobe of the thyroid, adenoma versus  primary thyroid tumor. PFTs:   No flowsheet data found. No results found for this or any previous visit. No results found for this or any previous visit. FeNO: No results found for this or any previous visit. FeNO and Likelihood of Eosinophilic Asthma   Unlikely Intermediate Likely   <25 ppb 25-50 ppb >50ppb   Exercise Oximetry:  Echo: No results found for this or any previous visit from the past 3650 days.     [facility] Reference Info:                                                                                                                  Past Medical History:   Diagnosis Date    Arthritis     Atrial fibrillation ([facility] [address]     Coronary artery disease     MVDNL-32     Follicular thyroid carcinoma ([facility] [address]     Hypertension     Incarcerated ventral hernia 2016    s/p ventral hernia repair (no mesh); [facility] L[facility]     Microscopic hematuria     Obstructive sleep apnea on CPAP     Papillary thyroid carcinoma (HCC)     Peptic ulcer disease     Primary hypothyroidism     Reflux esophagitis 2018    Transient ischemic attack (TIA) 10/31/2008        Tobacco Use      Smoking status: Former        Packs/day: 2.00        Types: Cigarettes        Quit date: 1969        Years since quittin.1      Smokeless tobacco: Never    Allergies   Allergen Reactions    Hydrocodone-Acetaminophen Other (See Comments)     Severe restlessness    Oxycodone-Acetaminophen Nausea And Vomiting     Current Outpatient Medications   Medication Instructions    albuterol sulfate  (90 Base) MCG/ACT inhaler 2 puffs, Inhalation, EVERY 4 HOURS PRN    dilTIAZem (DILACOR XR) 180 MG extended release capsule TAKE 1 CAPSULE TWICE DAILY    esomeprazole (NEXIUM) 40 MG delayed release capsule Oral, DAILY    furosemide (LASIX) 40 MG tablet TAKE 1 TABLET TWICE DAILY    hydrALAZINE (APRESOLINE) 25 MG tablet TAKE 1 TABLET TWICE DAILY    levothyroxine (SYNTHROID) 125 mcg, Oral, DAILY BEFORE BREAKFAST    lisinopril (PRINIVIL;ZESTRIL) 40 MG tablet TAKE 1 TABLET TWICE DAILY    metoprolol succinate (TOPROL XL) 25 MG extended release tablet TAKE 1 TABLET EVERY DAY    Multiple Vitamin (MULTIVITAMIN ADULT PO) Oral, DAILY    tamsulosin (FLOMAX) 0.4 mg, Oral, DAILY, TAKE 1 CAPSULE EVERY MORNING    warfarin (COUMADIN) 1 MG tablet Take 1 tab as directed by [facility]iol[facility]    warfarin (COUMADIN) 2.5 MG tablet Take 1 to 1.5 tabs by mouth, daily as directed by [facility] Cardiology    warfarin (COUMADIN) 1 mg, Oral, DAILY

## 2023-02-20 ENCOUNTER — ANTI-COAG VISIT (OUTPATIENT)
Dept: CARDIOLOGY CLINIC | Age: 88
End: 2023-02-20
Payer: MEDICARE

## 2023-02-20 DIAGNOSIS — I48.21 PERMANENT ATRIAL FIBRILLATION (HCC): Primary | ICD-10-CM

## 2023-02-20 DIAGNOSIS — Z79.01 LONG TERM (CURRENT) USE OF ANTICOAGULANTS: ICD-10-CM

## 2023-02-20 DIAGNOSIS — Z79.01 LONG TERM CURRENT USE OF ANTICOAGULANT: ICD-10-CM

## 2023-02-20 LAB
POC INR: 2.3
PROTHROMBIN TIME, POC: NORMAL

## 2023-02-20 PROCEDURE — 85610 PROTHROMBIN TIME: CPT | Performed by: INTERNAL MEDICINE

## 2023-02-20 PROCEDURE — 93793 ANTICOAG MGMT PT WARFARIN: CPT | Performed by: INTERNAL MEDICINE

## 2023-02-20 NOTE — PROGRESS NOTES
Anticoagulation Summary  As of 2023      INR goal:  2.0-3.0   TTR:  51.9 % (8.7 mo)   INR used for dosin.3 (2023)   Warfarin maintenance plan:  3.5 mg (1 mg x 1 and 2.5 mg x 1) every Mon, Wed, Fri; 2.5 mg (2.5 mg x 1) all other days; Starting 2023   Weekly warfarin total:  20.5 mg   Plan last modified:  Yuko Shen MA (2022)   Next INR check:  3/20/2023   Target end date:      Indications    Permanent atrial fibrillation ([facility] [address] [I48.21]  Long term current use of anticoagulant [Z79.01]                 Anticoagulation Episode Summary       INR check location:  Anticoagulation Clinic    Preferred lab:      Send INR reminders to:  [facility] CARDIOLOGY MELINDA GUILLERMO    Comments:            Anticoagulation Care Providers       Provider Role Specialty Phone number    Lashawn Woo MD [facility] Cardiology [phone]

## 2023-02-20 NOTE — PATIENT INSTRUCTIONS
Reminder: Please contact the Coumadin Clinic at [phone]  when you have medication changes. Examples, new medications, antibiotics, discontinued medications, new supplements, missed doses of warfarin or if you took extra doses of warfarin. This also includes OTC medications. Notifying us helps reduce the possibility of high and low INR's. In addition, if warfarin needs to be held for any procedures, please have surgeon or physician's office contact us before holding anticoagulant. Thanks, [facility] Cardiology Coumadin Clinic.

## 2023-03-10 DIAGNOSIS — I49.5 SSS (SICK SINUS SYNDROME) (HCC): ICD-10-CM

## 2023-03-10 DIAGNOSIS — Z95.0 CARDIAC PACEMAKER: ICD-10-CM

## 2023-03-10 DIAGNOSIS — I48.21 PERMANENT ATRIAL FIBRILLATION (HCC): Primary | ICD-10-CM

## 2023-03-20 ENCOUNTER — ANTI-COAG VISIT (OUTPATIENT)
Dept: CARDIOLOGY CLINIC | Age: 88
End: 2023-03-20
Payer: MEDICARE

## 2023-03-20 DIAGNOSIS — I48.21 PERMANENT ATRIAL FIBRILLATION (HCC): Primary | ICD-10-CM

## 2023-03-20 DIAGNOSIS — Z79.01 LONG TERM CURRENT USE OF ANTICOAGULANT: ICD-10-CM

## 2023-03-20 DIAGNOSIS — Z79.01 LONG TERM (CURRENT) USE OF ANTICOAGULANTS: ICD-10-CM

## 2023-03-20 LAB
POC INR: 3.6
PROTHROMBIN TIME, POC: NORMAL

## 2023-03-20 PROCEDURE — 85610 PROTHROMBIN TIME: CPT | Performed by: INTERNAL MEDICINE

## 2023-03-20 PROCEDURE — 93793 ANTICOAG MGMT PT WARFARIN: CPT | Performed by: INTERNAL MEDICINE

## 2023-03-20 NOTE — PATIENT INSTRUCTIONS
Reminder: Please contact the Coumadin Clinic at [phone]  when you have medication changes. Examples, new medications, antibiotics, discontinued medications, new supplements, missed doses of warfarin or if you took extra doses of warfarin. This also includes OTC medications. Notifying us helps reduce the possibility of high and low INR's. In addition, if warfarin needs to be held for any procedures, please have surgeon or physician's office contact us before holding anticoagulant. Thanks, [facility] Cardiology Coumadin Clinic.

## 2023-03-20 NOTE — PROGRESS NOTES
Pt is above range, pt denies any diet or med changes.  Will decrease and recheck in 2 weeks//KM      Anticoagulation Summary  As of 3/20/2023      INR goal:  2.0-3.0   TTR:  52.0 % (9.7 mo)   INR used for dosing:  3.6 (3/20/2023)   Warfarin maintenance plan:  3.5 mg (1 mg x 1 and 2.5 mg x 1) every Mon, Wed, Fri; 2.5 mg (2.5 mg x 1) all other days; Starting 3/20/2023   Weekly warfarin total:  20.5 mg   Plan last modified:  Jules Cunningham MA (9/26/2022)   Next INR check:  4/3/2023   Target end date:      Indications    Permanent atrial fibrillation ([facility] [address] [I48.21]  Long term current use of anticoagulant [Z79.01]                 Anticoagulation Episode Summary       INR check location:  Anticoagulation Clinic    Preferred lab:      Send INR reminders to:  [facility] CARDIOLOGY MELINDA PT    Comments:            Anticoagulation Care Providers       Provider Role Specialty Phone number    Madina Camara MD [facility] Cardiology [phone]

## 2023-04-03 ENCOUNTER — ANTI-COAG VISIT (OUTPATIENT)
Dept: CARDIOLOGY CLINIC | Age: 88
End: 2023-04-03
Payer: MEDICARE

## 2023-04-03 DIAGNOSIS — I48.21 PERMANENT ATRIAL FIBRILLATION (HCC): Primary | ICD-10-CM

## 2023-04-03 DIAGNOSIS — Z79.01 LONG TERM (CURRENT) USE OF ANTICOAGULANTS: ICD-10-CM

## 2023-04-03 DIAGNOSIS — Z79.01 LONG TERM CURRENT USE OF ANTICOAGULANT: ICD-10-CM

## 2023-04-03 LAB
POC INR: 3
PROTHROMBIN TIME, POC: NORMAL

## 2023-04-03 PROCEDURE — 93793 ANTICOAG MGMT PT WARFARIN: CPT | Performed by: INTERNAL MEDICINE

## 2023-04-03 PROCEDURE — 85610 PROTHROMBIN TIME: CPT | Performed by: INTERNAL MEDICINE

## 2023-04-03 NOTE — PROGRESS NOTES
Pt is in range, however because pt is using Voltaren, will decrease weekly dosage and recheck in 2 weeks//KM      Anticoagulation Summary  As of 4/3/2023      INR goal:  2.0-3.0   TTR:  49.7 % (10.1 mo)   INR used for dosing:  3.0 (4/3/2023)   Warfarin maintenance plan:  3.5 mg (1 mg x 1 and 2.5 mg x 1) every Mon, Fri; 2.5 mg (2.5 mg x 1) all other days   Weekly warfarin total:  19.5 mg   Plan last modified:  Amos Benites MA (4/3/2023)   Next INR check:  4/17/2023   Target end date:      Indications    Permanent atrial fibrillation ([address] [I48.21]  Long term current use of anticoagulant [Z79.01]                 Anticoagulation Episode Summary       INR check location:  Anticoagulation Clinic    Preferred lab:      Send INR reminders to:  [facility] CARDIOLOGY MELINDA PT    Comments:            Anticoagulation Care Providers       Provider Role Specialty Phone number    Jamila Jane MD [facility] Cardiology [phone]

## 2023-04-03 NOTE — PATIENT INSTRUCTIONS
Reminder: Please contact the Coumadin Clinic at [phone]  when you have medication changes. Examples, new medications, antibiotics, discontinued medications, new supplements, missed doses of warfarin or if you took extra doses of warfarin. This also includes OTC medications. Notifying us helps reduce the possibility of high and low INR's. In addition, if warfarin needs to be held for any procedures, please have surgeon or physician's office contact us before holding anticoagulant. Thanks, [facility] Cardiology Coumadin Clinic.

## 2023-04-17 ENCOUNTER — ANTI-COAG VISIT (OUTPATIENT)
Dept: CARDIOLOGY CLINIC | Age: 88
End: 2023-04-17
Payer: MEDICARE

## 2023-04-17 DIAGNOSIS — I48.21 PERMANENT ATRIAL FIBRILLATION (HCC): Primary | ICD-10-CM

## 2023-04-17 DIAGNOSIS — Z79.01 LONG TERM CURRENT USE OF ANTICOAGULANT: ICD-10-CM

## 2023-04-17 DIAGNOSIS — Z79.01 LONG TERM (CURRENT) USE OF ANTICOAGULANTS: ICD-10-CM

## 2023-04-17 LAB
POC INR: 2.1
PROTHROMBIN TIME, POC: NORMAL

## 2023-04-17 PROCEDURE — 85610 PROTHROMBIN TIME: CPT | Performed by: INTERNAL MEDICINE

## 2023-04-17 PROCEDURE — 93793 ANTICOAG MGMT PT WARFARIN: CPT | Performed by: INTERNAL MEDICINE

## 2023-04-17 NOTE — PROGRESS NOTES
Anticoagulation Summary  As of 2023      INR goal:  2.0-3.0   TTR:  51.9 % (10.6 mo)   INR used for dosin.1 (2023)   Warfarin maintenance plan:  3.5 mg (1 mg x 1 and 2.5 mg x 1) every Mon, Fri; 2.5 mg (2.5 mg x 1) all other days   Weekly warfarin total:  19.5 mg   Plan last modified:  Ziggy Bautista MA (4/3/2023)   Next INR check:  5/15/2023   Target end date:      Indications    Permanent atrial fibrillation ([facility] [address] [I48.21]  Long term current use of anticoagulant [Z79.01]                 Anticoagulation Episode Summary       INR check location:  Anticoagulation Clinic    Preferred lab:      Send INR reminders to:  [facility] CARDIOLOGY MELINDA PT    Comments:            Anticoagulation Care Providers       Provider Role Specialty Phone number    Tanner Waite MD [facility] Cardiology [phone]

## 2023-04-17 NOTE — PATIENT INSTRUCTIONS
Reminder: Please contact the Coumadin Clinic at [phone]  when you have medication changes. Examples, new medications, antibiotics, discontinued medications, new supplements, missed doses of warfarin or if you took extra doses of warfarin. This also includes OTC medications. Notifying us helps reduce the possibility of high and low INR's. In addition, if warfarin needs to be held for any procedures, please have surgeon or physician's office contact us before holding anticoagulant. Thanks, [facility] Coumadin Clinic.

## 2023-04-24 ENCOUNTER — OFFICE VISIT (OUTPATIENT)
Dept: ENDOCRINOLOGY | Age: 88
End: 2023-04-24
Payer: MEDICARE

## 2023-04-24 VITALS
BODY MASS INDEX: 26.36 KG/M2 | DIASTOLIC BLOOD PRESSURE: 82 MMHG | WEIGHT: 189 LBS | SYSTOLIC BLOOD PRESSURE: 128 MMHG | HEART RATE: 71 BPM | OXYGEN SATURATION: 96 %

## 2023-04-24 DIAGNOSIS — C73 PAPILLARY THYROID CARCINOMA (HCC): ICD-10-CM

## 2023-04-24 DIAGNOSIS — I48.0 PAROXYSMAL ATRIAL FIBRILLATION (HCC): ICD-10-CM

## 2023-04-24 DIAGNOSIS — E03.9 PRIMARY HYPOTHYROIDISM: ICD-10-CM

## 2023-04-24 DIAGNOSIS — C73 FOLLICULAR THYROID CARCINOMA (HCC): Primary | ICD-10-CM

## 2023-04-24 PROCEDURE — G8417 CALC BMI ABV UP PARAM F/U: HCPCS | Performed by: INTERNAL MEDICINE

## 2023-04-24 PROCEDURE — 1123F ACP DISCUSS/DSCN MKR DOCD: CPT | Performed by: INTERNAL MEDICINE

## 2023-04-24 PROCEDURE — 99214 OFFICE O/P EST MOD 30 MIN: CPT | Performed by: INTERNAL MEDICINE

## 2023-04-24 PROCEDURE — 1036F TOBACCO NON-USER: CPT | Performed by: INTERNAL MEDICINE

## 2023-04-24 PROCEDURE — G8428 CUR MEDS NOT DOCUMENT: HCPCS | Performed by: INTERNAL MEDICINE

## 2023-04-24 RX ORDER — LEVOTHYROXINE SODIUM 0.12 MG/1
125 TABLET ORAL
Qty: 90 TABLET | Refills: 3 | Status: SHIPPED | OUTPATIENT
Start: 2023-04-24

## 2023-04-24 ASSESSMENT — ENCOUNTER SYMPTOMS
CHOKING: 0
VOICE CHANGE: 0
TROUBLE SWALLOWING: 0

## 2023-04-24 NOTE — PROGRESS NOTES
VERNON Contreras MD, [facility] ENDOCRINOLOGY   AND   THYROID NODULE CLINIC            Reason for visit: Follow-up of thyroid cancer      ASSESSMENT AND PLAN:    1. Follicular thyroid carcinoma (HCC)  Mr. Babin had MAMIE low risk thyroid cancer.  As such, he is adequately treated with thyroidectomy without radioiodine therapy.  Thyroglobulin has been in the expected range for someone who has undergone thyroidectomy but not received radioiodine (thyroglobulin from last week is pending).  I will follow that over time.  - Thyroglobulin Panel; Future    2. Papillary thyroid carcinoma (HCC)  - Thyroglobulin Panel; Future    3. Primary hypothyroidism (preceding thyroidectomy)  TSH target is the normal range.  Continue levothyroxine as currently prescribed.  - levothyroxine (SYNTHROID) 125 MCG tablet; Take 1 tablet by mouth every morning (before breakfast)  Dispense: 90 tablet; Refill: 3  - TSH; Future  - T4, Free; Future    4. Paroxysmal atrial fibrillation (HCC)        Follow-up and Dispositions    Return in about 6 months (around 10/24/2023).         History of Present Illness:    THYROID CANCER  Dilip Babin is seen in the THYROID NODULE CLINIC for follow-up of thyroid cancer.     Type of thyroid cancer: follicular thyroid cancer, microscopic papillary thyroid cancer     Date thyroid cancer diagnosed: 10/21/2019 on biopsy of a sonographically suspicious PET-positive thyroid nodule in the left lobe (cytology indeterminate (atypia of undetermined significance/[place] category III; Afirma genomic sequencing  suspicious)     Date(s) of surgery: 2/3/2020 (Dr. Jones at [place])     Surgical pathology: 1.4 cm follicular thyroid carcinoma in the left lobe (1 focus of angioinvasion, positive capsular invasion, no lymphatic invasion, uninvolved surgical margins, no extrathyroidal extension).  0.1 cm papillary carcinoma in the isthmus (uninvolved surgical margins).  Background lymphocytic

## 2023-05-05 ENCOUNTER — OFFICE VISIT (OUTPATIENT)
Dept: CARDIOLOGY CLINIC | Age: 88
End: 2023-05-05

## 2023-05-05 ENCOUNTER — ANTI-COAG VISIT (OUTPATIENT)
Dept: CARDIOLOGY CLINIC | Age: 88
End: 2023-05-05

## 2023-05-05 VITALS
HEART RATE: 80 BPM | WEIGHT: 190 LBS | SYSTOLIC BLOOD PRESSURE: 132 MMHG | BODY MASS INDEX: 26.6 KG/M2 | HEIGHT: 71 IN | DIASTOLIC BLOOD PRESSURE: 76 MMHG

## 2023-05-05 DIAGNOSIS — I48.21 PERMANENT ATRIAL FIBRILLATION (HCC): ICD-10-CM

## 2023-05-05 DIAGNOSIS — I48.21 PERMANENT ATRIAL FIBRILLATION (HCC): Primary | ICD-10-CM

## 2023-05-05 DIAGNOSIS — Z79.01 LONG TERM CURRENT USE OF ANTICOAGULANT: ICD-10-CM

## 2023-05-05 DIAGNOSIS — Z79.01 LONG TERM (CURRENT) USE OF ANTICOAGULANTS: ICD-10-CM

## 2023-05-05 DIAGNOSIS — I10 ESSENTIAL HYPERTENSION: ICD-10-CM

## 2023-05-05 DIAGNOSIS — Z95.0 CARDIAC PACEMAKER: Primary | ICD-10-CM

## 2023-05-05 DIAGNOSIS — I25.10 CORONARY ARTERY DISEASE INVOLVING NATIVE CORONARY ARTERY OF NATIVE HEART WITHOUT ANGINA PECTORIS: ICD-10-CM

## 2023-05-05 LAB
POC INR: 2.7
PROTHROMBIN TIME, POC: NORMAL

## 2023-05-05 ASSESSMENT — ENCOUNTER SYMPTOMS
ABDOMINAL PAIN: 0
HOARSE VOICE: 0
BLURRED VISION: 0
WHEEZING: 0
ORTHOPNEA: 0
BOWEL INCONTINENCE: 0
DIARRHEA: 0
HEMATEMESIS: 0
SPUTUM PRODUCTION: 0
COLOR CHANGE: 0
HEMATOCHEZIA: 0
SHORTNESS OF BREATH: 0

## 2023-05-05 NOTE — PATIENT INSTRUCTIONS
Reminder: Please contact the Coumadin Clinic at [phone]  when you have medication changes. Examples, new medications, antibiotics, discontinued medications, new supplements, missed doses of warfarin or if you took extra doses of warfarin. This also includes OTC medications. Notifying us helps reduce the possibility of high and low INR's. In addition, if warfarin needs to be held for any procedures, please have surgeon or physician's office contact us before holding anticoagulant. Thanks, [facility] Cardiology Coumadin Clinic.

## 2023-05-05 NOTE — PROGRESS NOTES
[facility] CARDIOLOGY  [address], [address] [facility], [address] [place] [address]  PHONE: [phone]        23        NAME:  Selena Hendricks  : 1935  MRN: [id number]       SUBJECTIVE:   Selena Hendricks is a 80 y.o. male seen for a follow up visit regarding the following: CAD,AF,SSS with pacemaker,and primary hypertension. He returns for scheduled follow up. Overall,he and his wife report that he has been doing very well. Chief Complaint   Patient presents with    Atrial Fibrillation     6 month follow up       HPI:    Atrial Fibrillation  Presents for follow-up visit. Symptoms are negative for an AICD problem, bradycardia, chest pain, dizziness, hemodynamic instability, hypertension, hypotension, pacemaker problem, palpitations, shortness of breath, syncope, tachycardia and weakness. The symptoms have been stable. Past Medical History, Past Surgical History, Family history, Social History, and Medications were all reviewed with the patient today and updated as necessary.          Current Outpatient Medications:     levothyroxine (SYNTHROID) 125 MCG tablet, Take 1 tablet by mouth every morning (before breakfast), Disp: 90 tablet, Rfl: 3    metoprolol succinate (TOPROL XL) 25 MG extended release tablet, TAKE 1 TABLET EVERY DAY, Disp: 90 tablet, Rfl: 3    tamsulosin (FLOMAX) 0.4 MG capsule, Take 1 capsule by mouth daily TAKE 1 CAPSULE EVERY MORNING, Disp: 90 capsule, Rfl: 3    Multiple Vitamin (MULTIVITAMIN ADULT PO), Take by mouth daily, Disp: , Rfl:     hydrALAZINE (APRESOLINE) 25 MG tablet, TAKE 1 TABLET TWICE DAILY, Disp: 180 tablet, Rfl: 3    dilTIAZem (DILACOR XR) 180 MG extended release capsule, TAKE 1 CAPSULE TWICE DAILY, Disp: 180 capsule, Rfl: 3    warfarin (COUMADIN) 1 MG tablet, Take 1 tab as directed by [facility] Cardiololgy, Disp: 40 tablet, Rfl: 0    warfarin (COUMADIN) 1 MG tablet, Take 1 tablet by mouth in the morning., Disp: 40 tablet, Rfl: 2    warfarin (COUMADIN) 2.5 MG
Speaking Coherently
normal...

## 2023-05-05 NOTE — PROGRESS NOTES
Anticoagulation Summary  As of 2023      INR goal:  2.0-3.0   TTR:  54.4 % (11.2 mo)   INR used for dosin.7 (2023)   Warfarin maintenance plan:  3.5 mg (1 mg x 1 and 2.5 mg x 1) every Mon, Fri; 2.5 mg (2.5 mg x 1) all other days   Weekly warfarin total:  19.5 mg   Plan last modified:  Terri Cuevas MA (4/3/2023)   Next INR check:  2023   Target end date:      Indications    Permanent atrial fibrillation ([facility] [address] [I48.21]  Long term current use of anticoagulant [Z79.01]                 Anticoagulation Episode Summary       INR check location:  Anticoagulation Clinic    Preferred lab:      Send INR reminders to:  [facility] CARDIOLOGY MELINDA PT    Comments:            Anticoagulation Care Providers       Provider Role Specialty Phone number    Ana Hadley MD Responsible Cardiology [phone]

## 2023-05-08 ENCOUNTER — TELEPHONE (OUTPATIENT)
Age: 88
End: 2023-05-08

## 2023-05-08 DIAGNOSIS — I48.21 PERMANENT ATRIAL FIBRILLATION (HCC): Primary | ICD-10-CM

## 2023-05-08 RX ORDER — LISINOPRIL 40 MG/1
TABLET ORAL
Qty: 180 TABLET | Refills: 3 | Status: SHIPPED | OUTPATIENT
Start: 2023-05-08

## 2023-06-08 ENCOUNTER — ANTI-COAG VISIT (OUTPATIENT)
Age: 88
End: 2023-06-08
Payer: MEDICARE

## 2023-06-08 DIAGNOSIS — I48.21 PERMANENT ATRIAL FIBRILLATION (HCC): Primary | ICD-10-CM

## 2023-06-08 DIAGNOSIS — Z79.01 LONG TERM CURRENT USE OF ANTICOAGULANT: ICD-10-CM

## 2023-06-08 LAB
POC INR: 2.3
PROTHROMBIN TIME, POC: NORMAL

## 2023-06-08 PROCEDURE — 93793 ANTICOAG MGMT PT WARFARIN: CPT | Performed by: INTERNAL MEDICINE

## 2023-06-08 PROCEDURE — 85610 PROTHROMBIN TIME: CPT | Performed by: INTERNAL MEDICINE

## 2023-06-08 NOTE — PROGRESS NOTES
Anticoagulation Summary  As of 2023      INR goal:  2.0-3.0   TTR:  58.7 % (1 y)   INR used for dosin.3 (2023)   Warfarin maintenance plan:  3.5 mg (1 mg x 1 and 2.5 mg x 1) every Mon, Fri; 2.5 mg (2.5 mg x 1) all other days   Weekly warfarin total:  19.5 mg   Plan last modified:  Jacob Norman MA (4/3/2023)   Next INR check:  2023   Target end date:      Indications    Permanent atrial fibrillation ([facility] [address] [I48.21]  Long term current use of anticoagulant [Z79.01]                 Anticoagulation Episode Summary       INR check location:  Anticoagulation Clinic    Preferred lab:      Send INR reminders to:  \[facility]\"" CARDIOLOGY MELINDA PT    Comments:            Anticoagulation Care Providers       Provider Role Specialty Phone number    Jean Claude Enriquez MD [facility] Cardiology [phone]

## 2023-06-08 NOTE — PATIENT INSTRUCTIONS
Reminder: Please contact the Coumadin Clinic at [phone]  when you have medication changes. Examples, new medications, antibiotics, discontinued medications, new supplements, missed doses of warfarin or if you took extra doses of warfarin. This also includes OTC medications. Notifying us helps reduce the possibility of high and low INR's. In addition, if warfarin needs to be held for any procedures, please have surgeon or physician's office contact us before holding anticoagulant. Thanks, [facility] Cardiology Coumadin Clinic.

## 2023-07-06 ENCOUNTER — ANTI-COAG VISIT (OUTPATIENT)
Age: 88
End: 2023-07-06

## 2023-07-06 DIAGNOSIS — I48.21 PERMANENT ATRIAL FIBRILLATION (HCC): Primary | ICD-10-CM

## 2023-07-06 DIAGNOSIS — Z79.01 LONG TERM CURRENT USE OF ANTICOAGULANT: ICD-10-CM

## 2023-07-06 LAB
POC INR: 3.8
PROTHROMBIN TIME, POC: NORMAL

## 2023-07-06 NOTE — PATIENT INSTRUCTIONS

## 2023-07-06 NOTE — PROGRESS NOTES
Pt is above range, pt denies any diet or med changes.  Will decrease dose and recheck in 2 weeks//KM    Anticoagulation Summary  As of 7/6/2023      INR goal:  2.0-3.0   TTR:  57.9 % (1.1 y)   INR used for dosing:  3.8 (7/6/2023)   Warfarin maintenance plan:  3.5 mg (1 mg x 1 and 2.5 mg x 1) every Mon, Fri; 2.5 mg (2.5 mg x 1) all other days   Weekly warfarin total:  19.5 mg   Plan last modified:  Liberty Le MA (4/3/2023)   Next INR check:  7/20/2023   Target end date:      Indications    Permanent atrial fibrillation ([address]) [I48.21]  Long term current use of anticoagulant [Z79.01]                 Anticoagulation Episode Summary       INR check location:  Anticoagulation Clinic    Preferred lab:      Send INR reminders to:  [facility] CARDIOLOGY MELINDA PT    Comments:            Anticoagulation Care Providers       Provider Role Specialty Phone number    Liberty Geronimo MD [facility] Cardiology [phone]

## 2023-07-07 DIAGNOSIS — I49.5 SSS (SICK SINUS SYNDROME) (HCC): ICD-10-CM

## 2023-07-07 DIAGNOSIS — Z95.0 CARDIAC PACEMAKER: Primary | ICD-10-CM

## 2023-07-07 DIAGNOSIS — I48.21 PERMANENT ATRIAL FIBRILLATION (HCC): ICD-10-CM

## 2023-07-07 RX ORDER — WARFARIN SODIUM 1 MG/1
TABLET ORAL
Qty: 90 TABLET | Refills: 1 | Status: SHIPPED | OUTPATIENT
Start: 2023-07-07

## 2023-07-17 ENCOUNTER — OFFICE VISIT (OUTPATIENT)
Dept: ORTHOPEDIC SURGERY | Age: 88
End: 2023-07-17
Payer: MEDICARE

## 2023-07-17 DIAGNOSIS — M47.816 LUMBAR SPONDYLOSIS: Primary | ICD-10-CM

## 2023-07-17 DIAGNOSIS — M41.26 OTHER IDIOPATHIC SCOLIOSIS, LUMBAR REGION: ICD-10-CM

## 2023-07-17 DIAGNOSIS — M51.36 DDD (DEGENERATIVE DISC DISEASE), LUMBAR: ICD-10-CM

## 2023-07-17 PROCEDURE — G8417 CALC BMI ABV UP PARAM F/U: HCPCS | Performed by: PHYSICIAN ASSISTANT

## 2023-07-17 PROCEDURE — 99204 OFFICE O/P NEW MOD 45 MIN: CPT | Performed by: PHYSICIAN ASSISTANT

## 2023-07-17 PROCEDURE — G8428 CUR MEDS NOT DOCUMENT: HCPCS | Performed by: PHYSICIAN ASSISTANT

## 2023-07-17 PROCEDURE — 1036F TOBACCO NON-USER: CPT | Performed by: PHYSICIAN ASSISTANT

## 2023-07-17 PROCEDURE — 1123F ACP DISCUSS/DSCN MKR DOCD: CPT | Performed by: PHYSICIAN ASSISTANT

## 2023-07-17 NOTE — PROGRESS NOTES
Name: Pat Gaspar  YOB: 1935  Gender: male  MRN: [id number]    CC:   Chief Complaint   Patient presents with    Lower Back Pain         HPI:   Pat Gaspar is a 80 y.o. male with a PMHx of A-fib, pacemaker, on Coumadin, among other comorbidities listed below. They present here for evaluation of bilateral low back pain. This been going on for many years. His pain is up to a 10 out of 10 at times. It is primarily right-sided low back pain but he also has pain bilaterally on the left side. He notes that he stands and sits at a tilted angle as well. He reports his legs feel that they get weak and give out when walking at times. He denies any radiating pain down the legs. He has tried a home exercise program but cannot tolerate the exercises because they exacerbate his pain. He last tried these last Friday. He takes Tylenol for pain, he is not a candidate for NSAIDs given his comorbidities. His pain is worse with standing and walking. His pain extends from the right lower back into the upper buttock region. He has a remote history of a bad accident many many years ago, no recent trauma.           Past Medical History Includes:   Past Medical History:   Diagnosis Date    Arthritis     Atrial fibrillation ([address])     Coronary artery disease     [id number]     Follicular thyroid carcinoma ([address])     Hypertension     Incarcerated ventral hernia 01/28/2016    s/p ventral hernia repair (no mesh); Dr. Abbie Ellis [facility]) 2010    Microscopic hematuria     Obstructive sleep apnea on CPAP     Papillary thyroid carcinoma (HCC)     Peptic ulcer disease     Primary hypothyroidism     Reflux esophagitis 2/23/2018    Transient ischemic attack (TIA) 10/31/2008   ,   Past Surgical History:   Procedure Laterality Date    CARDIAC CATHETERIZATION  1995    angioplasty    [address] Right 1990s    HERNIA REPAIR  01/2016    Ventral hernia (Dr. Evelin Trejo)

## 2023-07-20 ENCOUNTER — OFFICE VISIT (OUTPATIENT)
Dept: ORTHOPEDIC SURGERY | Age: 88
End: 2023-07-20

## 2023-07-20 ENCOUNTER — ANTI-COAG VISIT (OUTPATIENT)
Age: 88
End: 2023-07-20

## 2023-07-20 DIAGNOSIS — Z79.01 LONG TERM CURRENT USE OF ANTICOAGULANT: ICD-10-CM

## 2023-07-20 DIAGNOSIS — I48.21 PERMANENT ATRIAL FIBRILLATION (HCC): Primary | ICD-10-CM

## 2023-07-20 DIAGNOSIS — M47.816 LUMBAR SPONDYLOSIS: Primary | ICD-10-CM

## 2023-07-20 LAB
POC INR: 2.3
PROTHROMBIN TIME, POC: NORMAL

## 2023-07-20 RX ORDER — TRIAMCINOLONE ACETONIDE 40 MG/ML
200 INJECTION, SUSPENSION INTRA-ARTICULAR; INTRAMUSCULAR ONCE
Status: COMPLETED | OUTPATIENT
Start: 2023-07-20 | End: 2023-07-20

## 2023-07-20 RX ADMIN — TRIAMCINOLONE ACETONIDE 200 MG: 40 INJECTION, SUSPENSION INTRA-ARTICULAR; INTRAMUSCULAR at 15:08

## 2023-07-20 NOTE — PATIENT INSTRUCTIONS
Reminder: Please contact the Coumadin Clinic at [phone]  when you have medication changes. Examples, new medications, antibiotics, discontinued medications, new supplements, missed doses of warfarin or if you took extra doses of warfarin. This also includes OTC medications. Notifying us helps reduce the possibility of high and low INR's. In addition, if warfarin needs to be held for any procedures, please have surgeon or physician's office contact us before holding anticoagulant. Thanks, [facility] Cardiology Coumadin Clinic.

## 2023-07-20 NOTE — PROGRESS NOTES
Date: 07/20/23   Name: Marco Singh    Pre-Procedural Diagnosis:    Diagnosis Orders   1. Lumbar spondylosis  FL INJ LUMB/SAC FACET SINGLE LEVEL    XR INJ FACET LUMB SACRAL 2ND LVL    triamcinolone acetonide (KENALOG-40) injection 200 mg          Procedure: Bilateral Facet Joint Injections (2 levels)    Precautions:  [facility] Precautions spine injections: None. Patient denies any prior sensitivity to steroid, local anesthetic, contrast dye, iodine or shellfish. The procedure was discussed at length with the patient and informed consent was signed. The patient was placed in a prone position on the fluoroscopy table and the skin was prepped and draped in a routine sterile fashion. The areas to be injected were each anesthetized with approximately 2-3 cc of 1% Lidocaine. Initially a 22-gauge 3.5 inch spinal needle was carefully advanced under fluoroscopic guidance to the bilateral L4-L5 and L5-S1 facet joint spaces. 1 cc of 0.25% Marcaine and 50 mg of Kenalog were injected through the spinal needle at each site. Fluoroscopic guidance was used intermittently over a 10-minute period to insure proper needle placement and patient safety. A hard copy of the fluoroscopic  images has been placed in the patient's chart. The patient was monitored after the procedure and discharged home without complication. A total of 5 cc of Kenalog were administered during this procedure. Resume Meds:     N/A Remains on coumadin.     Brooks Chung MD  07/20/23

## 2023-07-20 NOTE — PROGRESS NOTES
Anticoagulation Summary  As of 7/20/2023      INR goal:  2.0-3.0   TTR:  57.4 % (1.1 y)   INR used for dosing:     Warfarin maintenance plan:  3.5 mg (1 mg x 1 and 2.5 mg x 1) every Mon, Fri; 2.5 mg (2.5 mg x 1) all other days   Weekly warfarin total:  19.5 mg   Plan last modified:  Pam Garcia MA (4/3/2023)   Next INR check:  8/3/2023   Target end date:      Indications    Permanent atrial fibrillation ([address]) [I48.21]  Long term current use of anticoagulant [Z79.01]                 Anticoagulation Episode Summary       INR check location:  Anticoagulation Clinic    Preferred lab:      Send INR reminders to:  [facility] CARDIOLOGY MELINDA PT    Comments:            Anticoagulation Care Providers       Provider Role Specialty Phone number    Barb Hernandez MD [facility] Cardiology [phone]

## 2023-07-24 RX ORDER — TRIAMCINOLONE ACETONIDE 40 MG/ML
200 INJECTION, SUSPENSION INTRA-ARTICULAR; INTRAMUSCULAR ONCE
Status: CANCELLED | OUTPATIENT
Start: 2023-07-25

## 2023-07-25 ENCOUNTER — OFFICE VISIT (OUTPATIENT)
Dept: ORTHOPEDIC SURGERY | Age: 88
End: 2023-07-25

## 2023-07-25 DIAGNOSIS — M47.816 LUMBAR SPONDYLOSIS: Primary | ICD-10-CM

## 2023-07-25 NOTE — PROGRESS NOTES
Pt inadvertently scheduled for injection today in error. Had nice response to injections 5 day ago. He will keep follow up  with Dina Echavarria in 2-3 weeks. Pt understood was gracious about mix up.

## 2023-08-04 ENCOUNTER — ANTI-COAG VISIT (OUTPATIENT)
Age: 88
End: 2023-08-04

## 2023-08-04 ENCOUNTER — NURSE ONLY (OUTPATIENT)
Age: 88
End: 2023-08-04

## 2023-08-04 DIAGNOSIS — Z79.01 LONG TERM CURRENT USE OF ANTICOAGULANT: ICD-10-CM

## 2023-08-04 DIAGNOSIS — I49.5 SSS (SICK SINUS SYNDROME) (HCC): Primary | ICD-10-CM

## 2023-08-04 DIAGNOSIS — I48.21 PERMANENT ATRIAL FIBRILLATION (HCC): Primary | ICD-10-CM

## 2023-08-04 LAB
POC INR: 3.9
PROTHROMBIN TIME, POC: YES

## 2023-08-04 NOTE — PATIENT INSTRUCTIONS
Reminder: Please contact the Coumadin Clinic at [phone]  when you have medication changes. Examples, new medications, antibiotics, discontinued medications, new supplements, missed doses of warfarin or if you took extra doses of warfarin. This also includes OTC medications. Notifying us helps reduce the possibility of high and low INR's. In addition, if warfarin needs to be held for any procedures, please have surgeon or physician's office contact us before holding anticoagulant. Thanks, [facility] Cardiology Coumadin Clinic.

## 2023-08-04 NOTE — PROGRESS NOTES
Anticoagulation Summary  As of 8/4/2023      INR goal:  2.0-3.0   TTR:  57.0 % (1.2 y)   INR used for dosing:  3.9 (8/4/2023)   Warfarin maintenance plan:  3.5 mg (1 mg x 1 and 2.5 mg x 1) every Mon, Fri; 2.5 mg (2.5 mg x 1) all other days   Weekly warfarin total:  19.5 mg   Plan last modified:  Shashi Benson MA (4/3/2023)   Next INR check:  8/18/2023   Target end date:      Indications    Permanent atrial fibrillation ([address]) [I48.21]  Long term current use of anticoagulant [Z79.01]                 Anticoagulation Episode Summary       INR check location:  Anticoagulation Clinic    Preferred lab:      Send INR reminders to:  [facility] CARDIOLOGY MEL[facility]DA PT    Comments:            Anticoagulation Care Providers       Provider Role Specialty Phone number    Kinsey Duque MD [facility] Cardiology [phone]

## 2023-08-07 RX ORDER — FUROSEMIDE 40 MG/1
TABLET ORAL
Qty: 180 TABLET | Refills: 3 | Status: SHIPPED | OUTPATIENT
Start: 2023-08-07

## 2023-08-09 ENCOUNTER — OFFICE VISIT (OUTPATIENT)
Dept: ORTHOPEDIC SURGERY | Age: 88
End: 2023-08-09
Payer: MEDICARE

## 2023-08-09 VITALS — WEIGHT: 190 LBS | HEIGHT: 71 IN | BODY MASS INDEX: 26.6 KG/M2

## 2023-08-09 DIAGNOSIS — M47.816 LUMBAR SPONDYLOSIS: Primary | ICD-10-CM

## 2023-08-09 PROCEDURE — G8427 DOCREV CUR MEDS BY ELIG CLIN: HCPCS | Performed by: PHYSICIAN ASSISTANT

## 2023-08-09 PROCEDURE — 1123F ACP DISCUSS/DSCN MKR DOCD: CPT | Performed by: PHYSICIAN ASSISTANT

## 2023-08-09 PROCEDURE — G8417 CALC BMI ABV UP PARAM F/U: HCPCS | Performed by: PHYSICIAN ASSISTANT

## 2023-08-09 PROCEDURE — 99213 OFFICE O/P EST LOW 20 MIN: CPT | Performed by: PHYSICIAN ASSISTANT

## 2023-08-09 PROCEDURE — 1036F TOBACCO NON-USER: CPT | Performed by: PHYSICIAN ASSISTANT

## 2023-08-09 NOTE — PROGRESS NOTES
08/09/23        Name: Ria Dozier  YOB: 1935  Gender: male  MRN: [id number]    CC: Follow-up       HPI: Ria Dozier is a 80 y.o. male who returns for Follow-up         Patient returns the office today after injection. Patient underwent bilateral L4-5 and L5-S1 facet joint injections with Dr. Vicky Wilson on 7/20/2023. They report greater than 80% improvement in their pain since the injection, and this continues thru today. They feel improvement in their functional abilities and greater comfort with their ADL's. Meds/PSH/PMH/FH/SH: This information has been reviewed. ALLERGIES:   Allergies   Allergen Reactions    Hydrocodone-Acetaminophen Other (See Comments)     Severe restlessness    Oxycodone-Acetaminophen Nausea And Vomiting              Physical Examination:            Imaging:         MRI Result (most recent):  No results found for this or any previous visit from the past 3650 days. Assessment and Plan    Patient had excellent response to the injection. I recommend surveillance for now. Will plan to follow up in 3-6 months for recheck of symptoms. If the patient's symptoms progress or evolve, he will likely require further workup with a CT myelogram.  He is not a candidate for an MRI given his pacemaker status.

## 2023-08-18 ENCOUNTER — ANTI-COAG VISIT (OUTPATIENT)
Age: 88
End: 2023-08-18

## 2023-08-18 DIAGNOSIS — Z79.01 LONG TERM CURRENT USE OF ANTICOAGULANT: ICD-10-CM

## 2023-08-18 DIAGNOSIS — I48.21 PERMANENT ATRIAL FIBRILLATION (HCC): Primary | ICD-10-CM

## 2023-08-18 LAB
POC INR: 3.5
PROTHROMBIN TIME, POC: YES

## 2023-08-18 NOTE — PROGRESS NOTES
Anticoagulation Summary  As of 8/18/2023      INR goal:  2.0-3.0   TTR:  55.2 % (1.2 y)   INR used for dosing:  3.5 (8/18/2023)   Warfarin maintenance plan:  3.5 mg (1 mg x 1 and 2.5 mg x 1) every Mon, Fri; 2.5 mg (2.5 mg x 1) all other days   Weekly warfarin total:  19.5 mg   Plan last modified:  Radha Alaniz MA (4/3/2023)   Next INR check:  9/1/2023   Target end date:      Indications    Permanent atrial fibrillation ([address]) [I48.21]  Long term current use of anticoagulant [Z79.01]                 Anticoagulation Episode Summary       INR check location:  Anticoagulation Clinic    Preferred lab:      Send INR reminders to:  [facility] CARDIOLOGY MELINDA PT    Comments:            Anticoagulation Care Providers       Provider Role Specialty Phone number    Krzysztfo Lewis MD Responsible Cardiology [phone]

## 2023-08-18 NOTE — PATIENT INSTRUCTIONS
Reminder: Please contact the Coumadin Clinic at [phone]  when you have medication changes. Examples, new medications, antibiotics, discontinued medications, new supplements, missed doses of warfarin or if you took extra doses of warfarin. This also includes OTC medications. Notifying us helps reduce the possibility of high and low INR's. In addition, if warfarin needs to be held for any procedures, please have surgeon or physician's office contact us before holding anticoagulant. Thanks, [facility] Cardiology Coumadin Clinic.
cleansed/copious irrigation

## 2023-09-01 ENCOUNTER — ANTI-COAG VISIT (OUTPATIENT)
Age: 88
End: 2023-09-01

## 2023-09-01 DIAGNOSIS — Z79.01 LONG TERM CURRENT USE OF ANTICOAGULANT: ICD-10-CM

## 2023-09-01 DIAGNOSIS — I48.21 PERMANENT ATRIAL FIBRILLATION (HCC): Primary | ICD-10-CM

## 2023-09-01 LAB
POC INR: >8
PROTHROMBIN TIME, POC: NORMAL

## 2023-09-01 NOTE — PROGRESS NOTES
Pts INR is supratherapeutic, this is likely due to pt taking several different antibiotics. Per Dr. Jesus Fish, hold 3 days take 2.54 mg Monday recheck on Tuesday.  //KM    Anticoagulation Summary  As of 9/1/2023      INR goal:  2.0-3.0   TTR:  55.2 % (1.2 y)   INR used for dosing:  >8.0 (9/1/2023)   Warfarin maintenance plan:  3.5 mg (1 mg x 1 and 2.5 mg x 1) every Mon, Fri; 2.5 mg (2.5 mg x 1) all other days   Weekly warfarin total:  19.5 mg   Plan last modified:  Denise Londono MA (4/3/2023)   Next INR check:  9/5/2023   Target end date:      Indications    Permanent atrial fibrillation ([address]) [I48.21]  Long term current use of anticoagulant [Z79.01]                 Anticoagulation Episode Summary       INR check location:  Anticoagulation Clinic    Preferred lab:      Send INR reminders to:  [facility] CARDIOLOGY MELINDA PT    Comments:            Anticoagulation Care Providers       Provider Role Specialty Phone number    Fletcher Garcia MD [facility] Cardiology [phone]

## 2023-09-01 NOTE — PATIENT INSTRUCTIONS
Reminder: Please contact the Coumadin Clinic at [phone]  when you have medication changes. Examples, new medications, antibiotics, discontinued medications, new supplements, missed doses of warfarin or if you took extra doses of warfarin. This also includes OTC medications. Notifying us helps reduce the possibility of high and low INR's. In addition, if warfarin needs to be held for any procedures, please have surgeon or physician's office contact us before holding anticoagulant. Thanks, [facility] Cardiology Coumadin Clinic.

## 2023-09-05 ENCOUNTER — ANTI-COAG VISIT (OUTPATIENT)
Age: 88
End: 2023-09-05
Payer: MEDICARE

## 2023-09-05 DIAGNOSIS — Z79.01 LONG TERM CURRENT USE OF ANTICOAGULANT: ICD-10-CM

## 2023-09-05 DIAGNOSIS — I48.21 PERMANENT ATRIAL FIBRILLATION (HCC): Primary | ICD-10-CM

## 2023-09-05 LAB
POC INR: 1.4
PROTHROMBIN TIME, POC: NORMAL

## 2023-09-05 PROCEDURE — 85610 PROTHROMBIN TIME: CPT | Performed by: INTERNAL MEDICINE

## 2023-09-05 PROCEDURE — 93793 ANTICOAG MGMT PT WARFARIN: CPT | Performed by: INTERNAL MEDICINE

## 2023-09-05 NOTE — PROGRESS NOTES
Pt is below range, this is likely due told warfarin med hold. Pt did not take his antibiotics during hold. He will speak with his PCP about the continuation of those medications.  Will increase dose and recheck in 1 week//KM    Anticoagulation Summary  As of 2023      INR goal:  2.0-3.0   TTR:  54.9 % (1.3 y)   INR used for dosin.4 (2023)   Warfarin maintenance plan:  3.5 mg (1 mg x 1 and 2.5 mg x 1) every Mon, Fri; 2.5 mg (2.5 mg x 1) all other days   Weekly warfarin total:  19.5 mg   Plan last modified:  Disha Fuchs MA (4/3/2023)   Next INR check:     Target end date:      Indications    Permanent atrial fibrillation ([address]) [I48.21]  Long term current use of anticoagulant [Z79.01]                 Anticoagulation Episode Summary       INR check location:  Anticoagulation Clinic    Preferred lab:      Send INR reminders to:  [facility] CARDIOLOGY MELINDA PT    Comments:            Anticoagulation Care Providers       Provider Role Specialty Phone number    Sherin Adkins MD [facility] Cardiology [phone]

## 2023-09-05 NOTE — PATIENT INSTRUCTIONS
Speak with Dr. Spencer Francois office about the continuation of antibiotics. If you start antibiotics please contact Michelle   [phone]. If you DO NOT start antibiotics, continue warfarin as instructed on dosage calendar. Reminder: Please contact the Coumadin Clinic at [phone]  when you have medication changes. Examples, new medications, antibiotics, discontinued medications, new supplements, missed doses of warfarin or if you took extra doses of warfarin. This also includes OTC medications. Notifying us helps reduce the possibility of high and low INR's. In addition, if warfarin needs to be held for any procedures, please have surgeon or physician's office contact us before holding anticoagulant. Thanks, [facility] Cardiology Coumadin Clinic.

## 2023-09-12 ENCOUNTER — ANTI-COAG VISIT (OUTPATIENT)
Age: 88
End: 2023-09-12

## 2023-09-12 DIAGNOSIS — Z79.01 LONG TERM CURRENT USE OF ANTICOAGULANT: ICD-10-CM

## 2023-09-12 DIAGNOSIS — I48.21 PERMANENT ATRIAL FIBRILLATION (HCC): Primary | ICD-10-CM

## 2023-09-12 LAB
POC INR: 1.6
PROTHROMBIN TIME, POC: NORMAL

## 2023-09-12 NOTE — PROGRESS NOTES
Pt is still below range, will increase and recheck in 2 weeks//KM    Anticoagulation Summary  As of 2023      INR goal:  2.0-3.0   TTR:  54.1 % (1.3 y)   INR used for dosin.6 (2023)   Warfarin maintenance plan:  3.5 mg (1 mg x 1 and 2.5 mg x 1) every Mon, Fri; 2.5 mg (2.5 mg x 1) all other days   Weekly warfarin total:  19.5 mg   Plan last modified:  Ayala Valiente MA (4/3/2023)   Next INR check:  2023   Target end date:      Indications    Permanent atrial fibrillation ([address]) [I48.21]  Long term current use of anticoagulant [Z79.01]                 Anticoagulation Episode Summary       INR check location:  Anticoagulation Clinic    Preferred lab:      Send INR reminders to:  [facility] CARDIOLOGY MELINDA GUILLERMO    Comments:            Anticoagulation Care Providers       Provider Role Specialty Phone number    Fabián Rangel MD [facility] Cardiology [phone]

## 2023-09-12 NOTE — PATIENT INSTRUCTIONS
Reminder: Please contact the Coumadin Clinic at [phone]  when you have medication changes. Examples, new medications, antibiotics, discontinued medications, new supplements, missed doses of warfarin or if you took extra doses of warfarin. This also includes OTC medications. Notifying us helps reduce the possibility of high and low INR's. In addition, if warfarin needs to be held for any procedures, please have surgeon or physician's office contact us before holding anticoagulant. Thanks, [facility] Cardiology Coumadin Clinic.

## 2023-09-26 ENCOUNTER — ANTI-COAG VISIT (OUTPATIENT)
Age: 88
End: 2023-09-26
Payer: MEDICARE

## 2023-09-26 ENCOUNTER — TELEPHONE (OUTPATIENT)
Age: 88
End: 2023-09-26

## 2023-09-26 DIAGNOSIS — I48.21 PERMANENT ATRIAL FIBRILLATION (HCC): Primary | ICD-10-CM

## 2023-09-26 DIAGNOSIS — Z79.01 LONG TERM CURRENT USE OF ANTICOAGULANT: ICD-10-CM

## 2023-09-26 LAB
POC INR: 2
PROTHROMBIN TIME, POC: NORMAL

## 2023-09-26 PROCEDURE — 93793 ANTICOAG MGMT PT WARFARIN: CPT | Performed by: INTERNAL MEDICINE

## 2023-09-26 PROCEDURE — 85610 PROTHROMBIN TIME: CPT | Performed by: INTERNAL MEDICINE

## 2023-09-26 NOTE — PROGRESS NOTES
Anticoagulation Summary  As of 2023      INR goal:  2.0-3.0   TTR:  52.5 % (1.3 y)   INR used for dosin.0 (2023)   Warfarin maintenance plan:  3.5 mg (1 mg x 1 and 2.5 mg x 1) every Mon, Fri; 2.5 mg (2.5 mg x 1) all other days   Weekly warfarin total:  19.5 mg   Plan last modified:  Cristina Schmitz MA (4/3/2023)   Next INR check:  10/10/2023   Target end date:      Indications    Permanent atrial fibrillation ([address]) [I48.21]  Long term current use of anticoagulant [Z79.01]                 Anticoagulation Episode Summary       INR check location:  Anticoagulation Clinic    Preferred lab:      Send INR reminders to:  [facility] CARDIOLOGY MELINDA PT    Comments:            Anticoagulation Care Providers       Provider Role Specialty Phone number    Bridget Taveras MD [facility] Cardiology [phone]

## 2023-09-26 NOTE — TELEPHONE ENCOUNTER
Pt is scheduled for an EGD on 10/5/23. They are asking for a 5 day hold of Warfarin. Please advise.  Pt's last OV 5/5/23

## 2023-09-26 NOTE — PATIENT INSTRUCTIONS
Reminder: Please contact the Coumadin Clinic at [phone]  when you have medication changes. Examples, new medications, antibiotics, discontinued medications, new supplements, missed doses of warfarin or if you took extra doses of warfarin. This also includes OTC medications. Notifying us helps reduce the possibility of high and low INR's. In addition, if warfarin needs to be held for any procedures, please have surgeon or physician's office contact us before holding anticoagulant. Thanks, [facility] Cardiology Coumadin Clinic.

## 2023-09-28 NOTE — TELEPHONE ENCOUNTER
Dr. Teresa Godoy approved 5 day hold, [facility]  informing pt of hold. Also spoke with Loreto at Dr. Opal Briseno office, informing her of hold.

## 2023-10-10 ENCOUNTER — ANTI-COAG VISIT (OUTPATIENT)
Age: 88
End: 2023-10-10
Payer: MEDICARE

## 2023-10-10 DIAGNOSIS — Z79.01 LONG TERM CURRENT USE OF ANTICOAGULANT: ICD-10-CM

## 2023-10-10 DIAGNOSIS — E03.9 PRIMARY HYPOTHYROIDISM: ICD-10-CM

## 2023-10-10 DIAGNOSIS — C73 FOLLICULAR THYROID CARCINOMA (HCC): ICD-10-CM

## 2023-10-10 DIAGNOSIS — I48.21 PERMANENT ATRIAL FIBRILLATION (HCC): Primary | ICD-10-CM

## 2023-10-10 DIAGNOSIS — C73 PAPILLARY THYROID CARCINOMA (HCC): ICD-10-CM

## 2023-10-10 LAB
POC INR: 1.9
PROTHROMBIN TIME, POC: NORMAL
T4 FREE SERPL-MCNC: 1.6 NG/DL (ref 0.78–1.46)
TSH, 3RD GENERATION: 0.46 UIU/ML (ref 0.36–3.74)

## 2023-10-10 PROCEDURE — 93793 ANTICOAG MGMT PT WARFARIN: CPT | Performed by: INTERNAL MEDICINE

## 2023-10-10 PROCEDURE — 85610 PROTHROMBIN TIME: CPT | Performed by: INTERNAL MEDICINE

## 2023-10-10 NOTE — PATIENT INSTRUCTIONS
Reminder: Please contact the Coumadin Clinic at [phone]  when you have medication changes. Examples, new medications, antibiotics, discontinued medications, new supplements, missed doses of warfarin or if you took extra doses of warfarin. This also includes OTC medications. Notifying us helps reduce the possibility of high and low INR's. In addition, if warfarin needs to be held for any procedures, please have surgeon or physician's office contact us before holding anticoagulant. Thanks, [facility] Cardiology Coumadin Clinic.

## 2023-10-10 NOTE — PROGRESS NOTES
Pt below range likely due to med hold, will recheck in 2 weeks, no changes at this time.    Anticoagulation Summary  As of 10/10/2023      INR goal:  2.0-3.0   TTR:  51.0 % (1.4 y)   INR used for dosing:     Warfarin maintenance plan:  3.5 mg (1 mg x 1 and 2.5 mg x 1) every Mon, Fri; 2.5 mg (2.5 mg x 1) all other days   Weekly warfarin total:  19.5 mg   Plan last modified:  Mino Silva MA (4/3/2023)   Next INR check:  10/24/2023   Target end date:      Indications    Permanent atrial fibrillation ([address]) [I48.21]  Long term current use of anticoagulant [Z79.01]                 Anticoagulation Episode Summary       INR check location:  Anticoagulation Clinic    Preferred lab:      Send INR reminders to:  [facility] CARDIOLOGY MELINDA GUILLERMO    Comments:            Anticoagulation Care Providers       Provider Role Specialty Phone number    Jenetta Hashimoto, MD [facility] Cardiology [phone]

## 2023-10-12 LAB — THYROGLOB AB SERPL-ACNC: 11.5 IU/ML (ref 0–0.9)

## 2023-10-17 RX ORDER — DILTIAZEM HYDROCHLORIDE 180 MG/1
CAPSULE, EXTENDED RELEASE ORAL
Qty: 180 CAPSULE | Refills: 10 | Status: SHIPPED | OUTPATIENT
Start: 2023-10-17

## 2023-10-17 RX ORDER — HYDRALAZINE HYDROCHLORIDE 25 MG/1
TABLET, FILM COATED ORAL
Qty: 180 TABLET | Refills: 10 | Status: SHIPPED | OUTPATIENT
Start: 2023-10-17

## 2023-10-23 LAB — THYROGLOBULIN: <2 NG/ML

## 2023-10-24 ENCOUNTER — OFFICE VISIT (OUTPATIENT)
Dept: ENDOCRINOLOGY | Age: 88
End: 2023-10-24
Payer: MEDICARE

## 2023-10-24 VITALS
WEIGHT: 187.4 LBS | BODY MASS INDEX: 26.14 KG/M2 | DIASTOLIC BLOOD PRESSURE: 74 MMHG | OXYGEN SATURATION: 97 % | SYSTOLIC BLOOD PRESSURE: 134 MMHG | HEART RATE: 71 BPM

## 2023-10-24 DIAGNOSIS — E03.9 PRIMARY HYPOTHYROIDISM: ICD-10-CM

## 2023-10-24 DIAGNOSIS — C73 FOLLICULAR THYROID CARCINOMA (HCC): Primary | ICD-10-CM

## 2023-10-24 DIAGNOSIS — C73 PAPILLARY THYROID CARCINOMA (HCC): ICD-10-CM

## 2023-10-24 PROCEDURE — G8427 DOCREV CUR MEDS BY ELIG CLIN: HCPCS | Performed by: INTERNAL MEDICINE

## 2023-10-24 PROCEDURE — 1123F ACP DISCUSS/DSCN MKR DOCD: CPT | Performed by: INTERNAL MEDICINE

## 2023-10-24 PROCEDURE — 99214 OFFICE O/P EST MOD 30 MIN: CPT | Performed by: INTERNAL MEDICINE

## 2023-10-24 PROCEDURE — 1036F TOBACCO NON-USER: CPT | Performed by: INTERNAL MEDICINE

## 2023-10-24 PROCEDURE — G8484 FLU IMMUNIZE NO ADMIN: HCPCS | Performed by: INTERNAL MEDICINE

## 2023-10-24 PROCEDURE — G8417 CALC BMI ABV UP PARAM F/U: HCPCS | Performed by: INTERNAL MEDICINE

## 2023-10-24 RX ORDER — LEVOTHYROXINE SODIUM 0.12 MG/1
125 TABLET ORAL
Qty: 90 TABLET | Refills: 3
Start: 2023-10-24

## 2023-10-24 RX ORDER — LEVOTHYROXINE SODIUM 0.12 MG/1
125 TABLET ORAL
Qty: 14 TABLET | Refills: 3 | Status: SHIPPED | OUTPATIENT
Start: 2023-10-24 | End: 2023-10-24 | Stop reason: SDUPTHER

## 2023-10-24 RX ORDER — LEVOTHYROXINE SODIUM 0.12 MG/1
125 TABLET ORAL
Qty: 90 TABLET | Refills: 3 | Status: SHIPPED | OUTPATIENT
Start: 2023-10-24 | End: 2023-10-24 | Stop reason: SDUPTHER

## 2023-10-24 ASSESSMENT — ENCOUNTER SYMPTOMS
CHOKING: 0
TROUBLE SWALLOWING: 0
VOICE CHANGE: 0

## 2023-10-24 NOTE — PROGRESS NOTES
Medication Sig Dispense Refill    levothyroxine (SYNTHROID) 125 MCG tablet Take 1 tablet by mouth every morning (before breakfast) 90 tablet 3    hydrALAZINE (APRESOLINE) 25 MG tablet TAKE 1 TABLET TWICE DAILY 180 tablet 10    dilTIAZem (DILACOR XR) 180 MG extended release capsule TAKE 1 CAPSULE TWICE DAILY 180 capsule 10    furosemide (LASIX) 40 MG tablet TAKE 1 TABLET TWICE DAILY 180 tablet 3    warfarin (COUMADIN) 1 MG tablet Take 1 tab daily as directed by [facility] Cardiology 90 tablet 1    lisinopril (PRINIVIL;ZESTRIL) 40 MG tablet TAKE 1 TABLET TWICE DAILY 180 tablet 3    metoprolol succinate (TOPROL XL) 25 MG extended release tablet TAKE 1 TABLET EVERY DAY 90 tablet 3    tamsulosin (FLOMAX) 0.4 MG capsule Take 1 capsule by mouth daily TAKE 1 CAPSULE EVERY MORNING 90 capsule 3    Multiple Vitamin (MULTIVITAMIN ADULT PO) Take by mouth daily      warfarin (COUMADIN) 1 MG tablet Take 1 tab as directed by [facility] Cardiolol[facility] 40 tablet 0    warfarin (COUMADIN) 2.5 MG tablet Take 1 to 1.5 tabs by mouth, daily as directed by [facility] Cardiology 135 tablet 3    albuterol sulfate  (90 Base) MCG/ACT inhaler Inhale 2 puffs into the lungs every 4 hours as needed      esomeprazole (NEXIUM) 40 MG delayed release capsule Take by mouth daily       No current facility-administered medications for this visit. Arnold Miller MD, FACE      Portions of this note were generated with the assistance of voice recognition software. As such, some errors in transcription may be present.

## 2023-10-25 ENCOUNTER — ANTI-COAG VISIT (OUTPATIENT)
Age: 88
End: 2023-10-25
Payer: MEDICARE

## 2023-10-25 DIAGNOSIS — Z79.01 LONG TERM CURRENT USE OF ANTICOAGULANT: ICD-10-CM

## 2023-10-25 DIAGNOSIS — I48.21 PERMANENT ATRIAL FIBRILLATION (HCC): Primary | ICD-10-CM

## 2023-10-25 LAB
POC INR: 3
PROTHROMBIN TIME, POC: NORMAL

## 2023-10-25 PROCEDURE — 85610 PROTHROMBIN TIME: CPT | Performed by: INTERNAL MEDICINE

## 2023-10-25 PROCEDURE — 93793 ANTICOAG MGMT PT WARFARIN: CPT | Performed by: INTERNAL MEDICINE

## 2023-10-25 NOTE — PATIENT INSTRUCTIONS
Reminder: Please contact the Coumadin Clinic at [phone]  when you have medication changes. Examples, new medications, antibiotics, discontinued medications, new supplements, missed doses of warfarin or if you took extra doses of warfarin. This also includes OTC medications. Notifying us helps reduce the possibility of high and low INR's. In addition, if warfarin needs to be held for any procedures, please have surgeon or physician's office contact us before holding anticoagulant. Thanks, [facility] Cardiology Coumadin Clinic.

## 2023-11-03 ENCOUNTER — NURSE ONLY (OUTPATIENT)
Age: 88
End: 2023-11-03

## 2023-11-03 DIAGNOSIS — I49.5 TACHY-BRADY SYNDROME (HCC): Primary | ICD-10-CM

## 2023-11-09 ENCOUNTER — OFFICE VISIT (OUTPATIENT)
Dept: ORTHOPEDIC SURGERY | Age: 88
End: 2023-11-09
Payer: MEDICARE

## 2023-11-09 VITALS — BODY MASS INDEX: 26.18 KG/M2 | WEIGHT: 187 LBS | HEIGHT: 71 IN

## 2023-11-09 DIAGNOSIS — M47.816 LUMBAR SPONDYLOSIS: Primary | ICD-10-CM

## 2023-11-09 PROCEDURE — 1123F ACP DISCUSS/DSCN MKR DOCD: CPT | Performed by: PHYSICIAN ASSISTANT

## 2023-11-09 PROCEDURE — G8484 FLU IMMUNIZE NO ADMIN: HCPCS | Performed by: PHYSICIAN ASSISTANT

## 2023-11-09 PROCEDURE — G8417 CALC BMI ABV UP PARAM F/U: HCPCS | Performed by: PHYSICIAN ASSISTANT

## 2023-11-09 PROCEDURE — 1036F TOBACCO NON-USER: CPT | Performed by: PHYSICIAN ASSISTANT

## 2023-11-09 PROCEDURE — G8427 DOCREV CUR MEDS BY ELIG CLIN: HCPCS | Performed by: PHYSICIAN ASSISTANT

## 2023-11-09 PROCEDURE — 99214 OFFICE O/P EST MOD 30 MIN: CPT | Performed by: PHYSICIAN ASSISTANT

## 2023-11-09 NOTE — PROGRESS NOTES
11/09/23        Name: Yaz Cuellar  YOB: 1935  Gender: male  MRN: [id number]    CC: Follow-up       HPI: Yaz Cuellar is a 80 y.o. male who returns for Follow-up         Patient returns the office today for follow-up. He last underwent bilateral L4-5 and L5-S1 facet joint injections with Dr. Goldy Burton on 7/20/2023. He is in well regarding this injection with greater than 80% improvement in his pain for more than 3 months following this injection. Has been able to walk and be much more active and more comfortable exercising for activity. Over the last few weeks his back pain is gradually returned. He is continue to do back exercises daily since his last injection but those are no longer helping and continuing. He takes Tylenol as needed. Still not having any radicular features. His pain is similar nature to previously several months ago when we saw him. History was obtained by patient      Meds/PSH/PMH/FH/SH: This information has been reviewed. ALLERGIES:   Allergies   Allergen Reactions    Hydrocodone-Acetaminophen Other (See Comments)     Severe restlessness    Oxycodone-Acetaminophen Nausea And Vomiting              Physical Examination:            Imaging:         MRI Result (most recent):  No results found for this or any previous visit from the past 3650 days. Assessment and Plan    The patient has had a return of his back pain. I recommend undergoing bilateral L4-5 and L5-S1 facet joint injections with Dr. Goldy Burton again. This to be scheduled and arranged. I will plan to follow-up in 6 months for annual visit or sooner if needed.

## 2023-11-10 ENCOUNTER — OFFICE VISIT (OUTPATIENT)
Age: 88
End: 2023-11-10

## 2023-11-10 VITALS
HEIGHT: 71 IN | SYSTOLIC BLOOD PRESSURE: 134 MMHG | BODY MASS INDEX: 26.6 KG/M2 | WEIGHT: 190 LBS | HEART RATE: 66 BPM | DIASTOLIC BLOOD PRESSURE: 68 MMHG

## 2023-11-10 DIAGNOSIS — Z95.0 CARDIAC PACEMAKER: Primary | ICD-10-CM

## 2023-11-10 DIAGNOSIS — I10 ESSENTIAL HYPERTENSION: ICD-10-CM

## 2023-11-10 DIAGNOSIS — I48.21 PERMANENT ATRIAL FIBRILLATION (HCC): ICD-10-CM

## 2023-11-10 DIAGNOSIS — I25.10 CORONARY ARTERY DISEASE INVOLVING NATIVE CORONARY ARTERY OF NATIVE HEART WITHOUT ANGINA PECTORIS: ICD-10-CM

## 2023-11-10 ASSESSMENT — ENCOUNTER SYMPTOMS
BLURRED VISION: 0
HEMATEMESIS: 0
SHORTNESS OF BREATH: 0
DIARRHEA: 0
COLOR CHANGE: 0
HEMATOCHEZIA: 0
BOWEL INCONTINENCE: 0
WHEEZING: 0
ORTHOPNEA: 0
SPUTUM PRODUCTION: 0
ABDOMINAL PAIN: 0
HOARSE VOICE: 0

## 2023-11-20 ENCOUNTER — OFFICE VISIT (OUTPATIENT)
Dept: ORTHOPEDIC SURGERY | Age: 88
End: 2023-11-20
Payer: MEDICARE

## 2023-11-20 DIAGNOSIS — M47.816 LUMBAR SPONDYLOSIS: Primary | ICD-10-CM

## 2023-11-20 PROCEDURE — 64494 INJ PARAVERT F JNT L/S 2 LEV: CPT | Performed by: PHYSICAL MEDICINE & REHABILITATION

## 2023-11-20 PROCEDURE — 64493 INJ PARAVERT F JNT L/S 1 LEV: CPT | Performed by: PHYSICAL MEDICINE & REHABILITATION

## 2023-11-20 RX ORDER — TRIAMCINOLONE ACETONIDE 40 MG/ML
200 INJECTION, SUSPENSION INTRA-ARTICULAR; INTRAMUSCULAR ONCE
Status: COMPLETED | OUTPATIENT
Start: 2023-11-20 | End: 2023-11-20

## 2023-11-20 RX ADMIN — TRIAMCINOLONE ACETONIDE 200 MG: 40 INJECTION, SUSPENSION INTRA-ARTICULAR; INTRAMUSCULAR at 14:06

## 2023-11-20 NOTE — PROGRESS NOTES
Date: 11/20/23   Name: Angelica Zaman    Pre-Procedural Diagnosis:    Diagnosis Orders   1. Lumbar spondylosis  FL INJ LUMB/SAC FACET SINGLE LEVEL    XR INJ FACET LUMB SACRAL 2ND LVL    triamcinolone acetonide (KENALOG-40) injection 200 mg          Procedure: Bilateral Facet Joint Injections (2 levels)    Precautions:  [facility] Precautions spine injections: None. Patient denies any prior sensitivity to steroid, local anesthetic, contrast dye, iodine or shellfish. The procedure was discussed at length with the patient and informed consent was signed. The patient was placed in a prone position on the fluoroscopy table and the skin was prepped and draped in a routine sterile fashion. The areas to be injected were each anesthetized with approximately 2-3 cc of 1% Lidocaine. Initially a 22-gauge 3.5 inch spinal needle was carefully advanced under fluoroscopic guidance to the bilateral L4-L5 and L5-S1 facet joint spaces. 1 cc of 0.25% Marcaine and 50 mg of Kenalog were injected through the spinal needle at each site. Fluoroscopic guidance was used intermittently over a 10-minute period to insure proper needle placement and patient safety. A hard copy of the fluoroscopic  images has been placed in the patient's chart. The patient was monitored after the procedure and discharged home without complication. A total of 5 cc of Kenalog were administered during this procedure. Resume Meds:     N/A Remains on coumadin.     Elmer Kerns MD  11/20/23

## 2023-11-22 ENCOUNTER — ANTI-COAG VISIT (OUTPATIENT)
Age: 88
End: 2023-11-22
Payer: MEDICARE

## 2023-11-22 DIAGNOSIS — Z79.01 LONG TERM CURRENT USE OF ANTICOAGULANT: ICD-10-CM

## 2023-11-22 DIAGNOSIS — I48.21 PERMANENT ATRIAL FIBRILLATION (HCC): Primary | ICD-10-CM

## 2023-11-22 LAB
POC INR: 4
PROTHROMBIN TIME, POC: NORMAL

## 2023-11-22 PROCEDURE — 93793 ANTICOAG MGMT PT WARFARIN: CPT | Performed by: INTERNAL MEDICINE

## 2023-11-22 PROCEDURE — 85610 PROTHROMBIN TIME: CPT | Performed by: INTERNAL MEDICINE

## 2023-11-22 NOTE — PATIENT INSTRUCTIONS
Reminder: Please contact the Coumadin Clinic at [phone]  when you have medication changes. Examples, new medications, antibiotics, discontinued medications, new supplements, missed doses of warfarin or if you took extra doses of warfarin. This also includes OTC medications. Notifying us helps reduce the possibility of high and low INR's. In addition, if warfarin needs to be held for any procedures, please have surgeon or physician's office contact us before holding anticoagulant. Thanks, [facility] Cardiology Coumadin Clinic.

## 2023-12-06 ENCOUNTER — ANTI-COAG VISIT (OUTPATIENT)
Age: 88
End: 2023-12-06
Payer: MEDICARE

## 2023-12-06 DIAGNOSIS — I48.21 PERMANENT ATRIAL FIBRILLATION (HCC): Primary | ICD-10-CM

## 2023-12-06 DIAGNOSIS — Z79.01 LONG TERM CURRENT USE OF ANTICOAGULANT: ICD-10-CM

## 2023-12-06 LAB
POC INR: 3.3
PROTHROMBIN TIME, POC: NORMAL

## 2023-12-06 PROCEDURE — 85610 PROTHROMBIN TIME: CPT | Performed by: INTERNAL MEDICINE

## 2023-12-06 PROCEDURE — 93793 ANTICOAG MGMT PT WARFARIN: CPT | Performed by: INTERNAL MEDICINE

## 2023-12-06 NOTE — PATIENT INSTRUCTIONS
Reminder: Please contact the Coumadin Clinic at [phone]  when you have medication changes. Examples, new medications, antibiotics, discontinued medications, new supplements, missed doses of warfarin or if you took extra doses of warfarin. This also includes OTC medications. Notifying us helps reduce the possibility of high and low INR's. In addition, if warfarin needs to be held for any procedures, please have surgeon or physician's office contact us before holding anticoagulant. Thanks, [facility] Cardiology Coumadin Clinic.

## 2023-12-06 NOTE — PROGRESS NOTES
Pt is above range. Pt is taking a supplement that has saw palmetto which is known to increase INR.  Will decrease weekly dosage and recheck in 2 weeks//KM

## 2023-12-27 ENCOUNTER — OFFICE VISIT (OUTPATIENT)
Dept: ORTHOPEDIC SURGERY | Age: 88
End: 2023-12-27
Payer: MEDICARE

## 2023-12-27 VITALS — HEIGHT: 71 IN | WEIGHT: 190 LBS | BODY MASS INDEX: 26.6 KG/M2

## 2023-12-27 DIAGNOSIS — M47.816 LUMBAR SPONDYLOSIS: Primary | ICD-10-CM

## 2023-12-27 PROCEDURE — 1123F ACP DISCUSS/DSCN MKR DOCD: CPT | Performed by: PHYSICIAN ASSISTANT

## 2023-12-27 PROCEDURE — G8427 DOCREV CUR MEDS BY ELIG CLIN: HCPCS | Performed by: PHYSICIAN ASSISTANT

## 2023-12-27 PROCEDURE — G8484 FLU IMMUNIZE NO ADMIN: HCPCS | Performed by: PHYSICIAN ASSISTANT

## 2023-12-27 PROCEDURE — 99214 OFFICE O/P EST MOD 30 MIN: CPT | Performed by: PHYSICIAN ASSISTANT

## 2023-12-27 PROCEDURE — G8417 CALC BMI ABV UP PARAM F/U: HCPCS | Performed by: PHYSICIAN ASSISTANT

## 2023-12-27 PROCEDURE — 1036F TOBACCO NON-USER: CPT | Performed by: PHYSICIAN ASSISTANT

## 2023-12-27 NOTE — PROGRESS NOTES
12/27/23        Name: Pillo Leon  YOB: 1935  Gender: male  MRN: [id number]    CC: Follow-up       HPI: Pillo Leon is a 80 y.o. male who returns for Follow-up         Patient returns to the office today for follow-up. He last underwent bilateral L4-5 and L5-S1 facet joint injection with Dr. Ahsan Castro on 11/20/2023. He reports only a few days of improvement after the injection. 70 to 80% improvement for only 2 to 3 days. His back pain is since returned up to a 10 out of 10 at times. Having difficulty getting in bed and changing positions. History was obtained by patient      Meds/PSH/PMH/FH/SH: This information has been reviewed. ALLERGIES:   Allergies   Allergen Reactions    Hydrocodone-Acetaminophen Other (See Comments)     Severe restlessness    Oxycodone-Acetaminophen Nausea And Vomiting              Physical Examination:            Imaging:         MRI Result (most recent):  No results found for this or any previous visit from the past 3650 days. Assessment and Plan    Unfortunate the patient did not have his good response to the facet injection at this time as it did before. 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 He could be a candidate for an ablation in the future at [facility], but I would first recommend a CT myelogram to rule out spinal stenosis or other pathology that may be targeted therapies for lumbar injection treatments. This was explained to the patient in detail. Will order CT myelogram of the lumbar spine to rule out significant spinal or foraminal stenosis, and have him follow-up in the office with me afterwards to discuss results. He may still merit a referral to [facility] for radiofrequency ablation in the future.

## 2024-01-02 ENCOUNTER — TELEPHONE (OUTPATIENT)
Dept: ORTHOPEDIC SURGERY | Age: 89
End: 2024-01-02

## 2024-01-02 NOTE — TELEPHONE ENCOUNTER
Called that number and no one answered, if pt calls please specify which referral are they asking for.

## 2024-01-02 NOTE — TELEPHONE ENCOUNTER
Glory wade/ Dr Luna [phone] has not received referral on patient and patient is at office trying to make an apt.

## 2024-01-03 ENCOUNTER — ANTI-COAG VISIT (OUTPATIENT)
Age: 89
End: 2024-01-03

## 2024-01-03 DIAGNOSIS — I48.21 PERMANENT ATRIAL FIBRILLATION (HCC): Primary | ICD-10-CM

## 2024-01-03 DIAGNOSIS — Z79.01 LONG TERM CURRENT USE OF ANTICOAGULANT: ICD-10-CM

## 2024-01-03 DIAGNOSIS — M47.816 LUMBAR SPONDYLOSIS: ICD-10-CM

## 2024-01-03 LAB
POC INR: 2.6
PROTHROMBIN TIME, POC: NORMAL

## 2024-01-03 NOTE — PATIENT INSTRUCTIONS
Reminder: Please contact the Coumadin Clinic at [phone]  when you have medication changes. Examples, new medications, antibiotics, discontinued medications, new supplements, missed doses of warfarin or if you took extra doses of warfarin.  This also includes OTC medications. Notifying us helps reduce the possibility of high and low INR's. In addition, if warfarin needs to be held for any procedures, please have surgeon or physician's office contact us before holding anticoagulant. Thanks, [facility] Cardiology Coumadin Clinic.

## 2024-01-17 ENCOUNTER — ANTI-COAG VISIT (OUTPATIENT)
Age: 89
End: 2024-01-17

## 2024-01-17 DIAGNOSIS — Z79.01 LONG TERM CURRENT USE OF ANTICOAGULANT: ICD-10-CM

## 2024-01-17 DIAGNOSIS — I48.21 PERMANENT ATRIAL FIBRILLATION (HCC): Primary | ICD-10-CM

## 2024-01-17 LAB
POC INR: 2.4
PROTHROMBIN TIME, POC: NORMAL

## 2024-01-17 NOTE — PATIENT INSTRUCTIONS
Reminder: Please contact the Coumadin Clinic at [phone]  when you have medication changes. Examples, new medications, antibiotics, discontinued medications, new supplements, missed doses of warfarin or if you took extra doses of warfarin.  This also includes OTC medications. Notifying us helps reduce the possibility of high and low INR's. In addition, if warfarin needs to be held for any procedures, please have surgeon or physician's office contact us before holding anticoagulant. Thanks, [facility] Cardiology Coumadin Clinic.

## 2024-01-18 RX ORDER — METOPROLOL SUCCINATE 25 MG/1
TABLET, EXTENDED RELEASE ORAL
Qty: 90 TABLET | Refills: 3 | Status: SHIPPED | OUTPATIENT
Start: 2024-01-18

## 2024-01-18 RX ORDER — WARFARIN SODIUM 2.5 MG/1
TABLET ORAL
Qty: 135 TABLET | Refills: 3 | Status: SHIPPED | OUTPATIENT
Start: 2024-01-18

## 2024-02-02 ENCOUNTER — NURSE ONLY (OUTPATIENT)
Age: 89
End: 2024-02-02

## 2024-02-02 DIAGNOSIS — I49.5 TACHY-BRADY SYNDROME (HCC): Primary | ICD-10-CM

## 2024-03-14 ENCOUNTER — ANTI-COAG VISIT (OUTPATIENT)
Age: 89
End: 2024-03-14

## 2024-03-14 DIAGNOSIS — I48.21 PERMANENT ATRIAL FIBRILLATION (HCC): Primary | ICD-10-CM

## 2024-03-14 DIAGNOSIS — Z79.01 LONG TERM CURRENT USE OF ANTICOAGULANT: ICD-10-CM

## 2024-03-14 LAB
POC INR: 2.4
PROTHROMBIN TIME, POC: NORMAL

## 2024-03-14 NOTE — PATIENT INSTRUCTIONS
Reminder: Please contact the Coumadin Clinic at [phone]  when you have medication changes. Examples, new medications, antibiotics, discontinued medications, new supplements, missed doses of warfarin or if you took extra doses of warfarin.  This also includes OTC medications. Notifying us helps reduce the possibility of high and low INR's. In addition, if warfarin needs to be held for any procedures, please have surgeon or physician's office contact us before holding anticoagulant. Thanks, [facility] Cardiology Coumadin Clinic.

## 2024-04-11 ENCOUNTER — ANTI-COAG VISIT (OUTPATIENT)
Age: 89
End: 2024-04-11
Payer: MEDICARE

## 2024-04-11 DIAGNOSIS — C73 PAPILLARY THYROID CARCINOMA (HCC): ICD-10-CM

## 2024-04-11 DIAGNOSIS — Z79.01 LONG TERM CURRENT USE OF ANTICOAGULANT: ICD-10-CM

## 2024-04-11 DIAGNOSIS — I48.21 PERMANENT ATRIAL FIBRILLATION (HCC): Primary | ICD-10-CM

## 2024-04-11 DIAGNOSIS — E03.9 PRIMARY HYPOTHYROIDISM: ICD-10-CM

## 2024-04-11 DIAGNOSIS — C73 FOLLICULAR THYROID CARCINOMA (HCC): ICD-10-CM

## 2024-04-11 LAB
POC INR: 2.4
PROTHROMBIN TIME, POC: YES
T4 FREE SERPL-MCNC: 1.3 NG/DL (ref 0.78–1.46)
TSH, 3RD GENERATION: 2.34 UIU/ML (ref 0.36–3.74)

## 2024-04-11 PROCEDURE — 85610 PROTHROMBIN TIME: CPT | Performed by: INTERNAL MEDICINE

## 2024-04-11 PROCEDURE — 93793 ANTICOAG MGMT PT WARFARIN: CPT | Performed by: INTERNAL MEDICINE

## 2024-04-11 NOTE — PROGRESS NOTES
Anticoagulation Summary  As of 2024      INR goal:  2.0-3.0   TTR:  56.2 % (1.9 y)   INR used for dosin.4 (2024)   Warfarin maintenance plan:  2.5 mg (2.5 mg x 1) every day   Weekly warfarin total:  17.5 mg   Plan last modified:  Michelle Ly MA (2023)   Next INR check:  2024   Target end date:      Indications    Permanent atrial fibrillation (HCC) [I48.21]  Long term current use of anticoagulant [Z79.01]                 Anticoagulation Episode Summary       INR check location:  Anticoagulation Clinic    Preferred lab:      Send INR reminders to:  [facility] CARDIOLOGY MELINDA PT    Comments:            Anticoagulation Care Providers       Provider Role Specialty Phone number    Trevor Oden MD [facility] Cardiology [phone]

## 2024-04-11 NOTE — PATIENT INSTRUCTIONS
Reminder: Please contact the Coumadin Clinic at [phone]  when you have medication changes. Examples, new medications, antibiotics, discontinued medications, new supplements, missed doses of warfarin or if you took extra doses of warfarin.  This also includes OTC medications. Notifying us helps reduce the possibility of high and low INR's. In addition, if warfarin needs to be held for any procedures, please have surgeon or physician's office contact us before holding anticoagulant. Thanks, [facility] Cardiology Coumadin Clinic.

## 2024-04-13 LAB — THYROGLOB AB SERPL-ACNC: 106.2 IU/ML (ref 0–0.9)

## 2024-04-24 LAB
THYROGLOB AB SERPL-ACNC: 106.2 IU/ML (ref 0–0.9)
THYROGLOBULIN: 6.2 NG/ML

## 2024-04-25 ENCOUNTER — OFFICE VISIT (OUTPATIENT)
Dept: ENDOCRINOLOGY | Age: 89
End: 2024-04-25
Payer: MEDICARE

## 2024-04-25 VITALS
BODY MASS INDEX: 26.78 KG/M2 | DIASTOLIC BLOOD PRESSURE: 68 MMHG | HEART RATE: 72 BPM | SYSTOLIC BLOOD PRESSURE: 136 MMHG | WEIGHT: 192 LBS

## 2024-04-25 DIAGNOSIS — E03.9 PRIMARY HYPOTHYROIDISM: ICD-10-CM

## 2024-04-25 DIAGNOSIS — Z85.850 HISTORY OF THYROID CANCER: Primary | ICD-10-CM

## 2024-04-25 DIAGNOSIS — I48.0 PAROXYSMAL ATRIAL FIBRILLATION (HCC): ICD-10-CM

## 2024-04-25 PROCEDURE — G2211 COMPLEX E/M VISIT ADD ON: HCPCS | Performed by: INTERNAL MEDICINE

## 2024-04-25 PROCEDURE — G8417 CALC BMI ABV UP PARAM F/U: HCPCS | Performed by: INTERNAL MEDICINE

## 2024-04-25 PROCEDURE — 1123F ACP DISCUSS/DSCN MKR DOCD: CPT | Performed by: INTERNAL MEDICINE

## 2024-04-25 PROCEDURE — 99214 OFFICE O/P EST MOD 30 MIN: CPT | Performed by: INTERNAL MEDICINE

## 2024-04-25 PROCEDURE — G8427 DOCREV CUR MEDS BY ELIG CLIN: HCPCS | Performed by: INTERNAL MEDICINE

## 2024-04-25 PROCEDURE — 1036F TOBACCO NON-USER: CPT | Performed by: INTERNAL MEDICINE

## 2024-04-25 RX ORDER — LEVOTHYROXINE SODIUM 0.12 MG/1
125 TABLET ORAL
Qty: 90 TABLET | Refills: 3 | Status: SHIPPED | OUTPATIENT
Start: 2024-04-25

## 2024-04-25 ASSESSMENT — ENCOUNTER SYMPTOMS
CONSTIPATION: 1
TROUBLE SWALLOWING: 0
DIARRHEA: 0
VOICE CHANGE: 1

## 2024-04-25 NOTE — PROGRESS NOTES
Ab and Thyroglobulin, WINNIE or JG     [facility] order [id number]     Standing Status:   Future     Standing Expiration Date:   4/25/2025         Current Outpatient Medications   Medication Sig Dispense Refill    levothyroxine (SYNTHROID) 125 MCG tablet Take 1 tablet by mouth every morning (before breakfast) 90 tablet 3    warfarin (COUMADIN) 2.5 MG tablet TAKE 1 TO 1 AND 1/2 TABS BY MOUTH DAILY AS DIRECTED BY [facility] CARDIOLOGY 135 tablet 3    metoprolol succinate (TOPROL XL) 25 MG extended release tablet TAKE 1 TABLET EVERY DAY 90 tablet 3    cephALEXin (KEFLEX) 250 MG capsule       finasteride (PROSCAR) 5 MG tablet Take 1 tablet by mouth daily 90 tablet 3    tamsulosin (FLOMAX) 0.4 MG capsule Take 1 capsule by mouth every evening 90 capsule 3    hydrALAZINE (APRESOLINE) 25 MG tablet TAKE 1 TABLET TWICE DAILY 180 tablet 10    dilTIAZem (DILACOR XR) 180 MG extended release capsule TAKE 1 CAPSULE TWICE DAILY 180 capsule 10    furosemide (LASIX) 40 MG tablet TAKE 1 TABLET TWICE DAILY 180 tablet 3    lisinopril (PRINIVIL;ZESTRIL) 40 MG tablet TAKE 1 TABLET TWICE DAILY 180 tablet 3    Multiple Vitamin (MULTIVITAMIN ADULT PO) Take by mouth daily      albuterol sulfate  (90 Base) MCG/ACT inhaler Inhale 2 puffs into the lungs every 4 hours as needed      esomeprazole (NEXIUM) 40 MG delayed release capsule Take by mouth daily      warfarin (COUMADIN) 1 MG tablet Take 1 tab daily as directed by [facility] Cardiology (Patient not taking: Reported on 4/25/2024) 90 tablet 1    warfarin (COUMADIN) 1 MG tablet Take 1 tab as directed by [facility] Cardiololgy (Patient not taking: Reported on 4/25/2024) 40 tablet 0     No current facility-administered medications for this visit.       VERNON Contreras MD, FACE      Portions of this note were generated with the assistance of voice recognition software.  As such, some errors in transcription may be present.

## 2024-05-09 ENCOUNTER — ANTI-COAG VISIT (OUTPATIENT)
Age: 89
End: 2024-05-09

## 2024-05-09 DIAGNOSIS — I48.21 PERMANENT ATRIAL FIBRILLATION (HCC): Primary | ICD-10-CM

## 2024-05-09 DIAGNOSIS — Z79.01 LONG TERM CURRENT USE OF ANTICOAGULANT: ICD-10-CM

## 2024-05-09 LAB
POC INR: 2.3
PROTHROMBIN TIME, POC: NORMAL

## 2024-05-09 NOTE — PATIENT INSTRUCTIONS
Reminder: Please contact the Coumadin Clinic at [phone]  when you have medication changes. Examples, new medications, antibiotics, discontinued medications, new supplements, missed doses of warfarin or if you took extra doses of warfarin.  This also includes OTC medications. Notifying us helps reduce the possibility of high and low INR's. In addition, if warfarin needs to be held for any procedures, please have surgeon or physician's office contact us before holding anticoagulant. Thanks, [facility] Cardiology Coumadin Clinic.

## 2024-05-13 ENCOUNTER — NURSE ONLY (OUTPATIENT)
Age: 89
End: 2024-05-13

## 2024-05-13 DIAGNOSIS — I49.5 TACHY-BRADY SYNDROME (HCC): Primary | ICD-10-CM

## 2024-05-21 ENCOUNTER — OFFICE VISIT (OUTPATIENT)
Dept: ORTHOPEDIC SURGERY | Age: 89
End: 2024-05-21
Payer: MEDICARE

## 2024-05-21 VITALS — BODY MASS INDEX: 26.88 KG/M2 | WEIGHT: 192 LBS | HEIGHT: 71 IN

## 2024-05-21 DIAGNOSIS — M47.816 LUMBAR SPONDYLOSIS: Primary | ICD-10-CM

## 2024-05-21 PROCEDURE — 1036F TOBACCO NON-USER: CPT | Performed by: PHYSICIAN ASSISTANT

## 2024-05-21 PROCEDURE — 99213 OFFICE O/P EST LOW 20 MIN: CPT | Performed by: PHYSICIAN ASSISTANT

## 2024-05-21 PROCEDURE — G8417 CALC BMI ABV UP PARAM F/U: HCPCS | Performed by: PHYSICIAN ASSISTANT

## 2024-05-21 PROCEDURE — G2211 COMPLEX E/M VISIT ADD ON: HCPCS | Performed by: PHYSICIAN ASSISTANT

## 2024-05-21 PROCEDURE — 1123F ACP DISCUSS/DSCN MKR DOCD: CPT | Performed by: PHYSICIAN ASSISTANT

## 2024-05-21 PROCEDURE — G8427 DOCREV CUR MEDS BY ELIG CLIN: HCPCS | Performed by: PHYSICIAN ASSISTANT

## 2024-05-21 NOTE — PROGRESS NOTES
Name: Dilip Babin  YOB: 1935  Gender: male  MRN: [id number]         *ANNUAL VISIT *        CC:   Chief Complaint   Patient presents with    Follow-up         HPI:   Dilip Babin is a 89 y.o. male with  PMHx below.  There are an established  patient of mine, and present here for Annual Visit.        We have been treating this patient's back pain with lumbar facet injection therapy.  He actually states that after I last saw him in the office his back pain has been much better.  Not having significant back pain at this time or radicular features.  He also has been seeing a chiropractor periodically and does feel like that has helped him as well.  Right now is not having any rating pain or numbness to the legs.  He does have some isolated left knee pain mostly with standing and walking relieved by sitting.  He says he has seen Dr. Anderson in the past.  Overall doing fairly well.  Pacemaker on warfarin.  He never did have a CT myelogram we discussed at her last visit.              Past Medical History Includes:   Past Medical History:   Diagnosis Date    Arthritis     Atrial fibrillation (HCC)     Coronary artery disease     COVID-19     Follicular thyroid carcinoma (HCC)     Hypertension     Incarcerated ventral hernia 01/28/2016    s/p ventral hernia repair (no mesh); Dr. Vyas     Melanoma of back (HCC) 2010    Microscopic hematuria     Obstructive sleep apnea on CPAP     Papillary thyroid carcinoma (HCC)     Peptic ulcer disease     Primary hypothyroidism     Reflux esophagitis     Transient ischemic attack (TIA) 10/31/2008   ,   Past Surgical History:   Procedure Laterality Date    CARDIAC CATHETERIZATION  1995    angioplasty    HEMORRHOID SURGERY  1990s    HERNIA REPAIR Right 1990s    HERNIA REPAIR  01/2016    Ventral hernia (Dr. Vyas)    MALIGNANT SKIN LESION EXCISION      2012    MOHS SURGERY  early 2000's    Right side RCR    OTHER SURGICAL HISTORY Left 08/12/2020    Basal cell

## 2024-05-29 ENCOUNTER — OFFICE VISIT (OUTPATIENT)
Age: 89
End: 2024-05-29
Payer: MEDICARE

## 2024-05-29 ENCOUNTER — ANTI-COAG VISIT (OUTPATIENT)
Age: 89
End: 2024-05-29
Payer: MEDICARE

## 2024-05-29 VITALS
HEIGHT: 71 IN | BODY MASS INDEX: 26.77 KG/M2 | DIASTOLIC BLOOD PRESSURE: 76 MMHG | WEIGHT: 191.2 LBS | HEART RATE: 71 BPM | SYSTOLIC BLOOD PRESSURE: 122 MMHG

## 2024-05-29 DIAGNOSIS — I48.21 PERMANENT ATRIAL FIBRILLATION (HCC): Primary | ICD-10-CM

## 2024-05-29 DIAGNOSIS — I10 ESSENTIAL HYPERTENSION: ICD-10-CM

## 2024-05-29 DIAGNOSIS — R29.898 WEAKNESS OF BOTH LEGS: ICD-10-CM

## 2024-05-29 DIAGNOSIS — R06.09 DYSPNEA ON EXERTION: ICD-10-CM
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
## 2024-05-29 DIAGNOSIS — Z79.01 LONG TERM CURRENT USE OF ANTICOAGULANT: ICD-10-CM

## 2024-05-29 DIAGNOSIS — Z95.0 CARDIAC PACEMAKER: ICD-10-CM

## 2024-05-29 PROBLEM — R29.6 FALLS: Status: ACTIVE | Noted: 2024-05-29
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
## 2024-05-29 LAB
POC INR: 2.8
PROTHROMBIN TIME, POC: NORMAL

## 2024-05-29 PROCEDURE — 99214 OFFICE O/P EST MOD 30 MIN: CPT | Performed by: INTERNAL MEDICINE

## 2024-05-29 PROCEDURE — 93000 ELECTROCARDIOGRAM COMPLETE: CPT | Performed by: INTERNAL MEDICINE

## 2024-05-29 PROCEDURE — G8417 CALC BMI ABV UP PARAM F/U: HCPCS | Performed by: INTERNAL MEDICINE

## 2024-05-29 PROCEDURE — 1036F TOBACCO NON-USER: CPT | Performed by: INTERNAL MEDICINE

## 2024-05-29 PROCEDURE — 85610 PROTHROMBIN TIME: CPT | Performed by: INTERNAL MEDICINE

## 2024-05-29 PROCEDURE — 1123F ACP DISCUSS/DSCN MKR DOCD: CPT | Performed by: INTERNAL MEDICINE

## 2024-05-29 PROCEDURE — G8427 DOCREV CUR MEDS BY ELIG CLIN: HCPCS | Performed by: INTERNAL MEDICINE

## 2024-05-29 ASSESSMENT — ENCOUNTER SYMPTOMS
ORTHOPNEA: 0
COLOR CHANGE: 0
WHEEZING: 0
BOWEL INCONTINENCE: 0
HOARSE VOICE: 0
HEMATEMESIS: 0
SPUTUM PRODUCTION: 0
DIARRHEA: 0
HEMATOCHEZIA: 0
ABDOMINAL PAIN: 0
SHORTNESS OF BREATH: 1
BLURRED VISION: 0

## 2024-05-29 NOTE — PATIENT INSTRUCTIONS
Reminder: Please contact the Coumadin Clinic at [phone]  when you have medication changes. Examples, new medications, antibiotics, discontinued medications, new supplements, missed doses of warfarin or if you took extra doses of warfarin.  This also includes OTC medications. Notifying us helps reduce the possibility of high and low INR's. In addition, if warfarin needs to be held for any procedures, please have surgeon or physician's office contact us before holding anticoagulant. Thanks, [facility] Cardiology Coumadin Clinic.

## 2024-05-29 NOTE — PROGRESS NOTES
[facility] CARDIOLOGY  [address] [place] [address], [facility] [address]  [place] [address]  PHONE: [phone]        24        NAME:  Dilip Babin  : 1935  MRN: [id number]       SUBJECTIVE:   Dilip Babin is a 89 y.o. male seen for a follow up visit regarding the following: CAD,AF ,PPM,and primary hypertension.He returns for scheduled follow up.  He reports worsening fatigue,FERRARI,and persistent leg edema.His wife reports 5 falls over the past 6 months.  Chief Complaint   Patient presents with    Atrial Fibrillation       HPI:    Atrial Fibrillation  Presents for follow-up visit. Symptoms include shortness of breath and weakness. Symptoms are negative for an AICD problem, bradycardia, chest pain, dizziness, hemodynamic instability, hypertension, hypotension, pacemaker problem, palpitations, syncope and tachycardia. The symptoms have been worsening.       Past Medical History, Past Surgical History, Family history, Social History, and Medications were all reviewed with the patient today and updated as necessary.         Current Outpatient Medications:     levothyroxine (SYNTHROID) 125 MCG tablet, Take 1 tablet by mouth every morning (before breakfast), Disp: 90 tablet, Rfl: 3    warfarin (COUMADIN) 2.5 MG tablet, TAKE 1 TO 1 AND 1/2 TABS BY MOUTH DAILY AS DIRECTED BY [facility] CARDIOLOGY, Disp: 135 tablet, Rfl: 3    metoprolol succinate (TOPROL XL) 25 MG extended release tablet, TAKE 1 TABLET EVERY DAY, Disp: 90 tablet, Rfl: 3    tamsulosin (FLOMAX) 0.4 MG capsule, Take 1 capsule by mouth every evening, Disp: 90 capsule, Rfl: 3    dilTIAZem (DILACOR XR) 180 MG extended release capsule, TAKE 1 CAPSULE TWICE DAILY, Disp: 180 capsule, Rfl: 10    furosemide (LASIX) 40 MG tablet, TAKE 1 TABLET TWICE DAILY, Disp: 180 tablet, Rfl: 3    lisinopril (PRINIVIL;ZESTRIL) 40 MG tablet, TAKE 1 TABLET TWICE DAILY, Disp: 180 tablet, Rfl: 3    Multiple Vitamin (MULTIVITAMIN ADULT PO), Take by mouth daily, Disp: , Rfl:

## 2024-06-03 NOTE — PROGRESS NOTES
Anticoagulation Summary  As of 2022      INR goal:  2.0-3.0   TTR:  33.4 % (2.3 mo)   INR used for dosin.3 (2022)   Warfarin maintenance plan:  3.5 mg (1 mg x 1 and 2.5 mg x 1) every Mon, Wed, Fri; 2.5 mg (2.5 mg x 1) all other days   Weekly warfarin total:  20.5 mg   Plan last modified:  Amauri Buck MA (2022)   Next INR check:  2022   Target end date:      Indications    Permanent atrial fibrillation ([facility] [address] [I48.21]  Long term current use of anticoagulant [Z79.01]                 Anticoagulation Episode Summary       INR check location:  Anticoagulation Clinic    Preferred lab:      Send INR reminders to:  [facility] CARDIOLOGY MELINDA PT    Comments:            Anticoagulation Care Providers       Provider Role Specialty Phone number    Nelly Alonzo MD [facility] Cardiology [phone] Spoke to patient.  Relayed result and recommendations.  Patient verbally understood.    Formatted US RUQ for 6 months and sent to PCP.

## 2024-06-24 ENCOUNTER — OFFICE VISIT (OUTPATIENT)
Dept: ORTHOPEDIC SURGERY | Age: 89
End: 2024-06-24

## 2024-06-24 DIAGNOSIS — M25.562 ACUTE PAIN OF LEFT KNEE: Primary | ICD-10-CM

## 2024-06-24 DIAGNOSIS — M17.12 PRIMARY OSTEOARTHRITIS OF LEFT KNEE: ICD-10-CM

## 2024-06-24 DIAGNOSIS — G89.29 CHRONIC LEFT-SIDED LOW BACK PAIN, UNSPECIFIED WHETHER SCIATICA PRESENT: ICD-10-CM

## 2024-06-24 DIAGNOSIS — M54.50 CHRONIC LEFT-SIDED LOW BACK PAIN, UNSPECIFIED WHETHER SCIATICA PRESENT: ICD-10-CM

## 2024-06-24 NOTE — PROGRESS NOTES
Name: Dliip Babin  YOB: 1935  Gender: male  MRN: [id number]    CC: Left knee pain    HPI: Dilip Babin is a 89 y.o. male who presents with concerns regarding his left knee.  He does have significant longstanding thoracolumbar issues with severe scoliosis.  He is followed by our spine care team and recently saw Ronald Cristina.  His wife is a patient of mine has done well with management of of her knee.    Patient states he been having concerns of his left knee for few months.  He notes pain in the anterior aspect of the knee in the popliteal aspect of the knee.  This is intermittent.  He does use a cane and has done so for at least 6 months.  His right knee gives him similar but less severe issues.  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      History was obtained from patient and spouse    ROS/Meds/PSH/PMH/FH/SH: I personally reviewed the patients standard intake form.  Below are the pertinents    Tobacco:  reports that he quit smoking about 54 years ago. His smoking use included cigarettes. He has never used smokeless tobacco.  Past Medical History:   Diagnosis Date    Arthritis     Atrial fibrillation (HCC)     Coronary artery disease     COVID-19     Follicular thyroid carcinoma (HCC)     Hypertension     Incarcerated ventral hernia 01/28/2016    s/p ventral hernia repair (no mesh); Dr. Vyas     Melanoma of back (HCC) 2010    Microscopic hematuria     Obstructive sleep apnea on CPAP     Papillary thyroid carcinoma (HCC)     Peptic ulcer disease     Primary hypothyroidism     Reflux esophagitis     Transient ischemic attack (TIA) 10/31/2008      Past Surgical History:   Procedure Laterality Date    CARDIAC CATHETERIZATION  1995    angioplasty    HEMORRHOID SURGERY  1990s    HERNIA REPAIR Right 1990s    HERNIA REPAIR  01/2016    Ventral hernia (Dr. Vyas)    MALIGNANT SKIN LESION EXCISION      2012    MOHS

## 2024-06-25 ENCOUNTER — ANTI-COAG VISIT (OUTPATIENT)
Age: 89
End: 2024-06-25
Payer: MEDICARE

## 2024-06-25 DIAGNOSIS — Z79.01 LONG TERM CURRENT USE OF ANTICOAGULANT: ICD-10-CM

## 2024-06-25 DIAGNOSIS — I48.21 PERMANENT ATRIAL FIBRILLATION (HCC): Primary | ICD-10-CM

## 2024-06-25 LAB
POC INR: 2.5
PROTHROMBIN TIME, POC: NORMAL

## 2024-06-25 PROCEDURE — 93793 ANTICOAG MGMT PT WARFARIN: CPT | Performed by: INTERNAL MEDICINE

## 2024-06-25 PROCEDURE — 85610 PROTHROMBIN TIME: CPT | Performed by: INTERNAL MEDICINE

## 2024-06-25 NOTE — PATIENT INSTRUCTIONS
Reminder: Please contact the Coumadin Clinic at [phone]  when you have medication changes. Examples, new medications, antibiotics, discontinued medications, new supplements, missed doses of warfarin or if you took extra doses of warfarin.  This also includes OTC medications. Notifying us helps reduce the possibility of high and low INR's. In addition, if warfarin needs to be held for any procedures, please have surgeon or physician's office contact us before holding anticoagulant. Thanks, [facility] Cardiology Coumadin Clinic.

## 2024-07-22 ENCOUNTER — OFFICE VISIT (OUTPATIENT)
Dept: ORTHOPEDIC SURGERY | Age: 89
End: 2024-07-22
Payer: MEDICARE

## 2024-07-22 ENCOUNTER — ANTI-COAG VISIT (OUTPATIENT)
Age: 89
End: 2024-07-22

## 2024-07-22 DIAGNOSIS — I48.21 PERMANENT ATRIAL FIBRILLATION (HCC): Primary | ICD-10-CM

## 2024-07-22 DIAGNOSIS — M17.12 PRIMARY OSTEOARTHRITIS OF LEFT KNEE: Primary | ICD-10-CM

## 2024-07-22 DIAGNOSIS — Z79.01 LONG TERM CURRENT USE OF ANTICOAGULANT: ICD-10-CM

## 2024-07-22 LAB
POC INR: 1.5
PROTHROMBIN TIME, POC: NORMAL

## 2024-07-22 PROCEDURE — 20611 DRAIN/INJ JOINT/BURSA W/US: CPT | Performed by: PHYSICIAN ASSISTANT

## 2024-07-22 NOTE — PATIENT INSTRUCTIONS
Reminder: Please contact the Coumadin Clinic at [phone]  when you have medication changes. Examples, new medications, antibiotics, discontinued medications, new supplements, missed doses of warfarin or if you took extra doses of warfarin.  This also includes OTC medications. Notifying us helps reduce the possibility of high and low INR's. In addition, if warfarin needs to be held for any procedures, please have surgeon or physician's office contact us before holding anticoagulant. Thanks, [facility] Cardiology Coumadin Clinic.

## 2024-07-22 NOTE — PROGRESS NOTES
Name: Dilip Babin  YOB: 1935  Gender: male  MRN: [id number]     CC: LEFT Knee Osteoarthritis      PROCEDURE: #1 of 3 Hyaluronic Injection    The patient's name and date of birth were confirmed prior to the injection.  The injection site was also confirmed with the patient prior to the procedure. After discussion of risks and benefits including but not limited to pain, infection, skin discoloration, and injury to blood vessels or nerves the patient verbally consented to proceed with injection of the LEFT.  The patient is to restrict their activity for 48 hours.    Radiology Report: US guidance was used to examine the joint, ensure adequate needle placement and to decrease the risk of joint aggravation. The intracondylar notch, retropatellar fat pad, patella tendon, patella and tibia were all visualized. Pre and post injection US still images were obtained and placed in the record. Image were obtained with a [employer] ultrasound transducer (model [id number]).    Procedure Note: After steriley prepping the LEFT knee, it was injected with a 2mL of Orthovisc and the medication was observed going into the intra-articular space via US guidance.    The patient tolerated the procedure without difficulty.    JEFFERY Miller

## 2024-07-29 ENCOUNTER — OFFICE VISIT (OUTPATIENT)
Dept: ORTHOPEDIC SURGERY | Age: 89
End: 2024-07-29
Payer: MEDICARE

## 2024-07-29 DIAGNOSIS — M17.12 PRIMARY OSTEOARTHRITIS OF LEFT KNEE: Primary | ICD-10-CM

## 2024-07-29 PROCEDURE — 20611 DRAIN/INJ JOINT/BURSA W/US: CPT | Performed by: PHYSICIAN ASSISTANT

## 2024-07-29 NOTE — PROGRESS NOTES
Name: Dilip Babin  YOB: 1935  Gender: male  MRN: [id number]     CC: LEFT Knee Osteoarthritis      PROCEDURE: #2 of 3 Hyaluronic Injection    The patient's name and date of birth were confirmed prior to the injection.  The injection site was also confirmed with the patient prior to the procedure. After discussion of risks and benefits including but not limited to pain, infection, skin discoloration, and injury to blood vessels or nerves the patient verbally consented to proceed with injection of the LEFT.  The patient is to restrict their activity for 48 hours.    Radiology Report: US guidance was used to examine the joint, ensure adequate needle placement and to decrease the risk of joint aggravation. The intracondylar notch, retropatellar fat pad, patella tendon, patella and tibia were all visualized. Pre and post injection US still images were obtained and placed in the record. Image were obtained with a NPM ultrasound transducer (model [id number]).    Procedure Note: After steriley prepping the LEFT knee, it was injected with a 2mL of Orthovisc and the medication was observed going into the intra-articular space via US guidance.    The patient tolerated the procedure without difficulty.    JEFFERY Miller

## 2024-08-02 ENCOUNTER — ANTI-COAG VISIT (OUTPATIENT)
Age: 89
End: 2024-08-02
Payer: MEDICARE

## 2024-08-02 ENCOUNTER — OFFICE VISIT (OUTPATIENT)
Age: 89
End: 2024-08-02
Payer: MEDICARE

## 2024-08-02 VITALS
DIASTOLIC BLOOD PRESSURE: 76 MMHG | WEIGHT: 189 LBS | HEART RATE: 68 BPM | SYSTOLIC BLOOD PRESSURE: 118 MMHG | BODY MASS INDEX: 26.36 KG/M2

## 2024-08-02 DIAGNOSIS — I10 ESSENTIAL HYPERTENSION: ICD-10-CM

## 2024-08-02 DIAGNOSIS — I48.21 PERMANENT ATRIAL FIBRILLATION (HCC): Primary | ICD-10-CM

## 2024-08-02 DIAGNOSIS — I35.0 NONRHEUMATIC AORTIC VALVE STENOSIS: ICD-10-CM

## 2024-08-02 DIAGNOSIS — Z95.0 CARDIAC PACEMAKER: ICD-10-CM

## 2024-08-02 DIAGNOSIS — Z79.01 LONG TERM CURRENT USE OF ANTICOAGULANT: ICD-10-CM

## 2024-08-02 DIAGNOSIS — I25.10 CORONARY ARTERY DISEASE INVOLVING NATIVE CORONARY ARTERY OF NATIVE HEART WITHOUT ANGINA PECTORIS: ICD-10-CM

## 2024-08-02 PROBLEM — I70.0 AORTIC CALCIFICATION (HCC): Status: RESOLVED | Noted: 2022-07-29 | Resolved: 2024-08-02

## 2024-08-02 LAB
POC INR: 3.3
PROTHROMBIN TIME, POC: NORMAL

## 2024-08-02 PROCEDURE — 85610 PROTHROMBIN TIME: CPT | Performed by: INTERNAL MEDICINE

## 2024-08-02 PROCEDURE — 99214 OFFICE O/P EST MOD 30 MIN: CPT | Performed by: INTERNAL MEDICINE

## 2024-08-02 PROCEDURE — 1036F TOBACCO NON-USER: CPT | Performed by: INTERNAL MEDICINE

## 2024-08-02 PROCEDURE — G8417 CALC BMI ABV UP PARAM F/U: HCPCS | Performed by: INTERNAL MEDICINE

## 2024-08-02 PROCEDURE — 1123F ACP DISCUSS/DSCN MKR DOCD: CPT | Performed by: INTERNAL MEDICINE

## 2024-08-02 PROCEDURE — G8427 DOCREV CUR MEDS BY ELIG CLIN: HCPCS | Performed by: INTERNAL MEDICINE

## 2024-08-02 RX ORDER — LISINOPRIL 40 MG/1
40 TABLET ORAL 2 TIMES DAILY
Qty: 180 TABLET | Refills: 3 | Status: SHIPPED | OUTPATIENT
Start: 2024-08-02

## 2024-08-02 RX ORDER — HYDRALAZINE HYDROCHLORIDE 25 MG/1
25 TABLET, FILM COATED ORAL 2 TIMES DAILY
Qty: 180 TABLET | Refills: 10 | Status: SHIPPED | OUTPATIENT
Start: 2024-08-02

## 2024-08-02 RX ORDER — FUROSEMIDE 40 MG/1
40 TABLET ORAL 2 TIMES DAILY
Qty: 180 TABLET | Refills: 3 | Status: SHIPPED | OUTPATIENT
Start: 2024-08-02

## 2024-08-02 RX ORDER — DILTIAZEM HYDROCHLORIDE 180 MG/1
180 CAPSULE, EXTENDED RELEASE ORAL 2 TIMES DAILY
Qty: 180 CAPSULE | Refills: 3 | Status: SHIPPED | OUTPATIENT
Start: 2024-08-02

## 2024-08-02 RX ORDER — METOPROLOL SUCCINATE 25 MG/1
25 TABLET, EXTENDED RELEASE ORAL DAILY
Qty: 90 TABLET | Refills: 3 | Status: SHIPPED | OUTPATIENT
Start: 2024-08-02

## 2024-08-02 ASSESSMENT — ENCOUNTER SYMPTOMS
BLURRED VISION: 0
HEMATOCHEZIA: 0
BOWEL INCONTINENCE: 0
HEMATEMESIS: 0
COLOR CHANGE: 0
WHEEZING: 0
DIARRHEA: 0
SPUTUM PRODUCTION: 0
HOARSE VOICE: 0
ORTHOPNEA: 0
ABDOMINAL PAIN: 0
SHORTNESS OF BREATH: 0

## 2024-08-02 NOTE — PATIENT INSTRUCTIONS
Reminder: Please contact the Coumadin Clinic at [phone]  when you have medication changes. Examples, new medications, antibiotics, discontinued medications, new supplements, missed doses of warfarin or if you took extra doses of warfarin.  This also includes OTC medications. Notifying us helps reduce the possibility of high and low INR's. In addition, if warfarin needs to be held for any procedures, please have surgeon or physician's office contact us before holding anticoagulant. Thanks, [facility] Cardiology Coumadin Clinic.

## 2024-08-02 NOTE — PROGRESS NOTES
ASSESSMENT and PLAN    Dilip was seen today for atrial fibrillation and results.    Diagnoses and all orders for this visit:    Permanent atrial fibrillation (HCC):Stable.EP referral to consider Watchman implant due to recurrent falls and bleeding risk.[id number] VASc score is 5.Continue VKA for now.Continue HR control with Cardizem & Toprol.  -     metoprolol succinate (TOPROL XL) 25 MG extended release tablet; Take 1 tablet by mouth daily  -     dilTIAZem (DILACOR XR) 180 MG extended release capsule; Take 1 capsule by mouth 2 times daily  -     [facility] Robert Dubon MD, Cardiac Electrophysiology, [place]    Cardiac pacemaker:Stable.Device clinic as scheduled.    Essential hypertension:Stable and near goal.Continue Cardizem,Toprol,Hydralazine,Lisinopril,Lasix,and home BP monitoring.  -     metoprolol succinate (TOPROL XL) 25 MG extended release tablet; Take 1 tablet by mouth daily  -     lisinopril (PRINIVIL;ZESTRIL) 40 MG tablet; Take 1 tablet by mouth 2 times daily  -     hydrALAZINE (APRESOLINE) 25 MG tablet; Take 1 tablet by mouth 2 times daily  -     furosemide (LASIX) 40 MG tablet; Take 1 tablet by mouth 2 times daily  -     dilTIAZem (DILACOR XR) 180 MG extended release capsule; Take 1 capsule by mouth 2 times daily    Coronary artery disease involving native coronary artery of native heart without angina pectoris:Stable with no reported angina.Continue Toprol.  -     metoprolol succinate (TOPROL XL) 25 MG extended release tablet; Take 1 tablet by mouth daily    Nonrheumatic aortic valve stenosis:Mild severity.Consider repeat echo in 1-2 years.          Disposition:    Return in about 6 months (around 2/2/2025).                PATT KEY MD  8/2/2024  4:14 PM

## 2024-08-05 ENCOUNTER — TELEPHONE (OUTPATIENT)
Dept: CARDIAC CATH/INVASIVE PROCEDURES | Age: 89
End: 2024-08-05

## 2024-08-05 NOTE — TELEPHONE ENCOUNTER
The patient was contacted to discuss LAAO. LVM with nurse navigator contact ([phone]) information. Mr. Babin has a Watchman consult with Dr. Dubon on 9/19/2024 at 10:15 am ([facility]).

## 2024-08-08 ENCOUNTER — NURSE ONLY (OUTPATIENT)
Age: 89
End: 2024-08-08

## 2024-08-08 ENCOUNTER — ANTI-COAG VISIT (OUTPATIENT)
Age: 89
End: 2024-08-08

## 2024-08-08 DIAGNOSIS — Z79.01 LONG TERM CURRENT USE OF ANTICOAGULANT: ICD-10-CM

## 2024-08-08 DIAGNOSIS — I49.5 SSS (SICK SINUS SYNDROME) (HCC): Primary | ICD-10-CM

## 2024-08-08 DIAGNOSIS — I48.21 PERMANENT ATRIAL FIBRILLATION (HCC): Primary | ICD-10-CM

## 2024-08-08 LAB
POC INR: 2.8
PROTHROMBIN TIME, POC: NORMAL

## 2024-08-08 NOTE — TELEPHONE ENCOUNTER
Spoke with Mrs. Babin re:upcoming Watchman consult.  He has been rescheduled for the next available opening at [facility].  He is scheduled to see Dr. Dubon on 9/27/2024 at 0830 am.  Watchman coordinator contact (Tamia Langford, [phone]) information provided.

## 2024-08-08 NOTE — PATIENT INSTRUCTIONS
Reminder: Please contact the Coumadin Clinic at [phone]  when you have medication changes. Examples, new medications, antibiotics, discontinued medications, new supplements, missed doses of warfarin or if you took extra doses of warfarin.  This also includes OTC medications. Notifying us helps reduce the possibility of high and low INR's. In addition, if warfarin needs to be held for any procedures, please have surgeon or physician's office contact us before holding anticoagulant. Thanks, [facility] Cardiology Coumadin Clinic.

## 2024-08-12 ENCOUNTER — OFFICE VISIT (OUTPATIENT)
Dept: ORTHOPEDIC SURGERY | Age: 89
End: 2024-08-12
Payer: MEDICARE

## 2024-08-12 DIAGNOSIS — M17.12 PRIMARY OSTEOARTHRITIS OF LEFT KNEE: Primary | ICD-10-CM

## 2024-08-12 PROCEDURE — 20611 DRAIN/INJ JOINT/BURSA W/US: CPT | Performed by: PHYSICIAN ASSISTANT

## 2024-08-12 NOTE — PROGRESS NOTES
Name: Dilip Babin  YOB: 1935  Gender: male  MRN: [id number]     CC: LEFT Knee Osteoarthritis      PROCEDURE: #3 of 3 Hyaluronic Injection    The patient's name and date of birth were confirmed prior to the injection.  The injection site was also confirmed with the patient prior to the procedure. After discussion of risks and benefits including but not limited to pain, infection, skin discoloration, and injury to blood vessels or nerves the patient verbally consented to proceed with injection of the LEFT.  The patient is to restrict their activity for 48 hours.    Radiology Report: US guidance was used to examine the joint, ensure adequate needle placement and to decrease the risk of joint aggravation. The intracondylar notch, retropatellar fat pad, patella tendon, patella and tibia were all visualized. Pre and post injection US still images were obtained and placed in the record. Image were obtained with a HOMETRAX ultrasound transducer (model [id number]).    Procedure Note: After steriley prepping the LEFT knee, it was injected with a 2mL of Orthovisc and the medication was observed going into the intra-articular space via US guidance.    The patient tolerated the procedure without difficulty.    JEFFERY Miller

## 2024-08-23 ENCOUNTER — ANTI-COAG VISIT (OUTPATIENT)
Age: 89
End: 2024-08-23

## 2024-08-23 DIAGNOSIS — Z79.01 LONG TERM CURRENT USE OF ANTICOAGULANT: ICD-10-CM

## 2024-08-23 DIAGNOSIS — I48.21 PERMANENT ATRIAL FIBRILLATION (HCC): Primary | ICD-10-CM

## 2024-08-23 LAB
POC INR: 2.1
PROTHROMBIN TIME, POC: NORMAL

## 2024-08-23 NOTE — PATIENT INSTRUCTIONS
Reminder: Please contact the Coumadin Clinic at [phone]  when you have medication changes. Examples, new medications, antibiotics, discontinued medications, new supplements, missed doses of warfarin or if you took extra doses of warfarin.  This also includes OTC medications. Notifying us helps reduce the possibility of high and low INR's. In addition, if warfarin needs to be held for any procedures, please have surgeon or physician's office contact us before holding anticoagulant. Thanks, [facility] Cardiology Coumadin Clinic.

## 2024-09-30 ENCOUNTER — TELEPHONE (OUTPATIENT)
Age: 89
End: 2024-09-30

## 2024-09-30 NOTE — TELEPHONE ENCOUNTER
Trying to reschedule device (battery check) from this past Friday but unable to reach patient or even leave a message.  Mailed letter to patient's home address asking him to call our device clinic when able to get this rescheduled.

## 2024-10-04 ENCOUNTER — TELEPHONE (OUTPATIENT)
Dept: CARDIAC CATH/INVASIVE PROCEDURES | Age: 89
End: 2024-10-04

## 2024-10-04 ENCOUNTER — ANTI-COAG VISIT (OUTPATIENT)
Age: 89
End: 2024-10-04

## 2024-10-04 DIAGNOSIS — I48.21 PERMANENT ATRIAL FIBRILLATION (HCC): Primary | ICD-10-CM

## 2024-10-04 DIAGNOSIS — Z79.01 LONG TERM CURRENT USE OF ANTICOAGULANT: ICD-10-CM

## 2024-10-04 LAB
POC INR: 1.9
PROTHROMBIN TIME, POC: NORMAL

## 2024-10-04 NOTE — TELEPHONE ENCOUNTER
Mr. Velasquez was phoned with new [employer] consult date with Dr. Dubon on 11/07/2024 at 08:30 am in the [place] office.  LVM with the consult date, time, and location.  [employer] coordinator contact ([phone]) information provided.

## 2024-10-21 DIAGNOSIS — Z85.850 HISTORY OF THYROID CANCER: ICD-10-CM

## 2024-10-21 DIAGNOSIS — E03.9 PRIMARY HYPOTHYROIDISM: ICD-10-CM

## 2024-10-21 LAB
T4 FREE SERPL-MCNC: 0.8 NG/DL (ref 0.9–1.7)
TSH, 3RD GENERATION: 32.9 UIU/ML (ref 0.27–4.2)

## 2024-10-29 ENCOUNTER — OFFICE VISIT (OUTPATIENT)
Dept: ENDOCRINOLOGY | Age: 89
End: 2024-10-29
Payer: MEDICARE

## 2024-10-29 VITALS — WEIGHT: 189 LBS | BODY MASS INDEX: 26.36 KG/M2 | SYSTOLIC BLOOD PRESSURE: 118 MMHG | DIASTOLIC BLOOD PRESSURE: 76 MMHG

## 2024-10-29 DIAGNOSIS — E03.9 PRIMARY HYPOTHYROIDISM: ICD-10-CM

## 2024-10-29 DIAGNOSIS — I48.0 PAROXYSMAL ATRIAL FIBRILLATION (HCC): ICD-10-CM

## 2024-10-29 DIAGNOSIS — Z85.850 HISTORY OF THYROID CANCER: Primary | ICD-10-CM

## 2024-10-29 PROCEDURE — G2211 COMPLEX E/M VISIT ADD ON: HCPCS | Performed by: INTERNAL MEDICINE

## 2024-10-29 PROCEDURE — G8484 FLU IMMUNIZE NO ADMIN: HCPCS | Performed by: INTERNAL MEDICINE

## 2024-10-29 PROCEDURE — 1123F ACP DISCUSS/DSCN MKR DOCD: CPT | Performed by: INTERNAL MEDICINE

## 2024-10-29 PROCEDURE — 99214 OFFICE O/P EST MOD 30 MIN: CPT | Performed by: INTERNAL MEDICINE

## 2024-10-29 PROCEDURE — 1036F TOBACCO NON-USER: CPT | Performed by: INTERNAL MEDICINE

## 2024-10-29 PROCEDURE — G8417 CALC BMI ABV UP PARAM F/U: HCPCS | Performed by: INTERNAL MEDICINE

## 2024-10-29 PROCEDURE — G8427 DOCREV CUR MEDS BY ELIG CLIN: HCPCS | Performed by: INTERNAL MEDICINE

## 2024-10-29 PROCEDURE — 1159F MED LIST DOCD IN RCRD: CPT | Performed by: INTERNAL MEDICINE

## 2024-10-29 RX ORDER — LEVOTHYROXINE SODIUM 137 UG/1
137 TABLET ORAL
Qty: 90 TABLET | Refills: 3 | Status: SHIPPED | OUTPATIENT
Start: 2024-10-29

## 2024-10-29 ASSESSMENT — ENCOUNTER SYMPTOMS
TROUBLE SWALLOWING: 0
CONSTIPATION: 0
VOICE CHANGE: 1
DIARRHEA: 0

## 2024-10-29 NOTE — PROGRESS NOTES
VERNON Contreras MD, [facility] ENDOCRINOLOGY   AND   THYROID NODULE CLINIC            Reason for visit: Follow-up of thyroid cancer      ASSESSMENT AND PLAN:    1. 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 Babin had MAMIE low risk thyroid cancer.  As such, he is adequately treated with thyroidectomy without radioiodine therapy.  Thyroglobulin has been in the expected range for someone who has undergone thyroidectomy but not received radioiodine.  For unknown reasons, his two most recent thyroglobulin antibody titers were much higher.  I will follow thyroglobulin over time.  I will provide him with longitudinal care.  - Thyroglobulin Ab and Thyroglobulin, WINNIE or JG; Future  - Thyroglobulin Ab and Thyroglobulin, WINNIE or JG; Future    2. Primary hypothyroidism (preceding thyroidectomy)  He is undertreated.  I will have him increase his levothyroxine dose.  He will recheck thyroid function in 6 weeks and again prior to the next appointment with me.  - levothyroxine (SYNTHROID) 137 MCG tablet; Take 1 tablet by mouth every morning (before breakfast)  Dispense: 90 tablet; Refill: 3  - TSH; Future  - T4, Free; Future  - TSH; Future  - T4, Free; Future    3. Paroxysmal atrial fibrillation (HCC)          Follow-up and Dispositions    Return in about 6 months (around 4/29/2025).             History of Present Illness:    THYROID CANCER  Dilip Babin is seen in the THYROID NODULE CLINIC for follow-up of thyroid cancer.     Type of thyroid cancer: follicular thyroid cancer, microscopic papillary thyroid cancer     Date thyroid cancer diagnosed: 10/21/2019 on biopsy of a sonographically suspicious PET-positive thyroid nodule in the left lobe (cytology indeterminate (atypia of undetermined significance/[place] category III; Afirma genomic sequencing  suspicious)     Date of surgery: 2/3/2020 (Dr. Jones at [place])     Surgical

## 2024-11-01 ENCOUNTER — NURSE ONLY (OUTPATIENT)
Age: 89
End: 2024-11-01

## 2024-11-01 ENCOUNTER — ANTI-COAG VISIT (OUTPATIENT)
Age: 89
End: 2024-11-01

## 2024-11-01 DIAGNOSIS — I48.21 PERMANENT ATRIAL FIBRILLATION (HCC): Primary | ICD-10-CM

## 2024-11-01 DIAGNOSIS — I49.5 SSS (SICK SINUS SYNDROME) (HCC): Primary | ICD-10-CM

## 2024-11-01 DIAGNOSIS — Z79.01 LONG TERM CURRENT USE OF ANTICOAGULANT: ICD-10-CM

## 2024-11-01 LAB
POC INR: 1.7
PROTHROMBIN TIME, POC: ABNORMAL

## 2024-11-01 NOTE — PATIENT INSTRUCTIONS
Reminder: Please contact the Coumadin Clinic at [phone]  when you have medication changes. Examples, new medications, antibiotics, discontinued medications, new supplements, missed doses of warfarin or if you took extra doses of warfarin.  This also includes OTC medications. Notifying us helps reduce the possibility of high and low INR's. In addition, if warfarin needs to be held for any procedures, please have surgeon or physician's office contact us before holding anticoagulant. Thanks, [facility] Cardiology Coumadin Clinic.

## 2024-11-01 NOTE — PROGRESS NOTES
Patient knows of no reason for subtherapeutic INR result. One time dose of 1.25 mg tonight instead of 2.5 mg. Then continue current maintenance plan (see Anticoag Dosing Calendar). INR to be rechecked in 2 week(s).

## 2024-11-03 LAB
THYROGLOB AB SERPL-ACNC: 203.4 IU/ML (ref 0–0.9)
THYROGLOBULIN: 6 NG/ML

## 2024-11-07 ENCOUNTER — OFFICE VISIT (OUTPATIENT)
Age: 89
End: 2024-11-07

## 2024-11-07 VITALS
WEIGHT: 193 LBS | HEIGHT: 71 IN | HEART RATE: 72 BPM | DIASTOLIC BLOOD PRESSURE: 72 MMHG | BODY MASS INDEX: 27.02 KG/M2 | SYSTOLIC BLOOD PRESSURE: 126 MMHG

## 2024-11-07 DIAGNOSIS — I48.0 PAROXYSMAL ATRIAL FIBRILLATION (HCC): Primary | ICD-10-CM

## 2024-11-07 NOTE — PROGRESS NOTES
HISTORY Left 2020    Basal cell surgery with skin graft left nose.  Dr. Rusty Morales    PACEMAKER  2009    [employer] pacemaker     PACEMAKER  2018    replacement    THYROIDECTOMY  2020    Total thyroidectomy (Dr. Jones)     Family History   Problem Relation Age of Onset    Stroke Sister     Hypertension Mother     Thyroid Cancer Neg Hx     Thyroid Disease Daughter         benign nodules    Breast Cancer Daughter         breast sarcoma    Breast Cancer Sister     Cancer Sister         leukemia    Stroke Mother     Breast Cancer Sister      Social History     Tobacco Use    Smoking status: Former     Current packs/day: 0.00     Types: Cigarettes     Quit date: 1969     Years since quittin.9    Smokeless tobacco: Never   Substance Use Topics    Alcohol use: No       ROS:  A comprehensive review of systems was performed with the pertinent positives and negatives as noted in the HPI. All other systems were reviewed and are negative.    PHYSICAL EXAM:   Ht 1.803 m (5' 11\")   Wt 87.5 kg (193 lb)   BMI 26.92 kg/m²      Wt Readings from Last 3 Encounters:   24 87.5 kg (193 lb)   10/29/24 85.7 kg (189 lb)   24 85.7 kg (189 lb)     BP Readings from Last 3 Encounters:   10/29/24 118/76   24 118/76   24 138/70       Gen: well appearing, well developed, NAD  Eyes: Pupils equal, EOMI  CV: RRR, no M/R/G, normal JVD, normal distal pulses, no JOSHUA  Pulm: CTAB, no accessory muscle uses, no wheezes, crackles  GI: soft, NT, ND  Neuro: Alert and oriented    Medical problems and test results were reviewed with the patient today.     No results found for any visits on 24.    EKG:  (EKG has been independently visualized by me with interpretation below)  ventricular pacing    Dilip \"[place]\" was seen today for paroxysmal atrial fibrillation and watchman consult .    Diagnoses and all orders for this visit:    Paroxysmal atrial fibrillation (HCC)  -     EKG 12

## 2024-11-12 ENCOUNTER — TELEPHONE (OUTPATIENT)
Dept: CARDIAC CATH/INVASIVE PROCEDURES | Age: 89
End: 2024-11-12

## 2024-11-14 ENCOUNTER — ANTI-COAG VISIT (OUTPATIENT)
Age: 89
End: 2024-11-14

## 2024-11-14 DIAGNOSIS — I48.21 PERMANENT ATRIAL FIBRILLATION (HCC): Primary | ICD-10-CM

## 2024-11-14 DIAGNOSIS — Z79.01 LONG TERM CURRENT USE OF ANTICOAGULANT: ICD-10-CM

## 2024-11-14 LAB
POC INR: 1.7
PROTHROMBIN TIME, POC: NORMAL

## 2024-11-14 NOTE — PATIENT INSTRUCTIONS
Reminder: Please contact the Coumadin Clinic at [phone]  when you have medication changes. Examples, new medications, antibiotics, discontinued medications, new supplements, missed doses of warfarin or if you took extra doses of warfarin.  This also includes OTC medications. Notifying us helps reduce the possibility of high and low INR's. In addition, if warfarin needs to be held for any procedures, please have surgeon or physician's office contact us before holding anticoagulant. Thanks, [facility] Cardiology Coumadin Clinic.

## 2024-11-15 ENCOUNTER — TELEPHONE (OUTPATIENT)
Dept: CARDIAC CATH/INVASIVE PROCEDURES | Age: 89
End: 2024-11-15

## 2024-11-15 NOTE — TELEPHONE ENCOUNTER
Mr. Babin phoned in that he is not ready to proceed with the Watchman procedure at this time.  He has nurse navigator contact ([phone]) information for future reference.

## 2024-11-25 ENCOUNTER — TELEPHONE (OUTPATIENT)
Age: 88
End: 2024-11-25

## 2024-11-27 ENCOUNTER — ANTI-COAG VISIT (OUTPATIENT)
Age: 88
End: 2024-11-27
Payer: MEDICARE

## 2024-11-27 DIAGNOSIS — I48.21 PERMANENT ATRIAL FIBRILLATION (HCC): Primary | ICD-10-CM

## 2024-11-27 DIAGNOSIS — Z79.01 LONG TERM CURRENT USE OF ANTICOAGULANT: ICD-10-CM

## 2024-11-27 LAB
POC INR: 2
PROTHROMBIN TIME, POC: NORMAL

## 2024-11-27 PROCEDURE — 85610 PROTHROMBIN TIME: CPT | Performed by: INTERNAL MEDICINE

## 2024-11-27 PROCEDURE — 93793 ANTICOAG MGMT PT WARFARIN: CPT | Performed by: INTERNAL MEDICINE

## 2024-11-27 NOTE — PATIENT INSTRUCTIONS
Reminder: Please contact the Coumadin Clinic at [phone]  when you have medication changes. Examples, new medications, antibiotics, discontinued medications, new supplements, missed doses of warfarin or if you took extra doses of warfarin.  This also includes OTC medications. Notifying us helps reduce the possibility of high and low INR's. In addition, if warfarin needs to be held for any procedures, please have surgeon or physician's office contact us before holding anticoagulant. Thanks, [facility] Cardiology Coumadin Clinic.

## 2024-12-09 ENCOUNTER — OFFICE VISIT (OUTPATIENT)
Age: 88
End: 2024-12-09
Payer: MEDICARE

## 2024-12-09 VITALS
DIASTOLIC BLOOD PRESSURE: 64 MMHG | BODY MASS INDEX: 26.63 KG/M2 | WEIGHT: 190.2 LBS | HEIGHT: 71 IN | SYSTOLIC BLOOD PRESSURE: 138 MMHG | HEART RATE: 63 BPM

## 2024-12-09 DIAGNOSIS — Z45.010 PACEMAKER AT END OF BATTERY LIFE: ICD-10-CM

## 2024-12-09 DIAGNOSIS — I10 ESSENTIAL HYPERTENSION: ICD-10-CM

## 2024-12-09 DIAGNOSIS — R00.1 SYMPTOMATIC BRADYCARDIA: Primary | ICD-10-CM

## 2024-12-09 PROCEDURE — 99214 OFFICE O/P EST MOD 30 MIN: CPT | Performed by: PHYSICIAN ASSISTANT

## 2024-12-09 PROCEDURE — G8428 CUR MEDS NOT DOCUMENT: HCPCS | Performed by: PHYSICIAN ASSISTANT

## 2024-12-09 PROCEDURE — 93000 ELECTROCARDIOGRAM COMPLETE: CPT | Performed by: PHYSICIAN ASSISTANT

## 2024-12-09 PROCEDURE — 1123F ACP DISCUSS/DSCN MKR DOCD: CPT | Performed by: PHYSICIAN ASSISTANT

## 2024-12-09 PROCEDURE — 1036F TOBACCO NON-USER: CPT | Performed by: PHYSICIAN ASSISTANT

## 2024-12-09 PROCEDURE — G8417 CALC BMI ABV UP PARAM F/U: HCPCS | Performed by: PHYSICIAN ASSISTANT

## 2024-12-09 PROCEDURE — 1126F AMNT PAIN NOTED NONE PRSNT: CPT | Performed by: PHYSICIAN ASSISTANT

## 2024-12-09 PROCEDURE — G8484 FLU IMMUNIZE NO ADMIN: HCPCS | Performed by: PHYSICIAN ASSISTANT

## 2024-12-09 NOTE — PROGRESS NOTES
Take 1 tablet by mouth 2 times daily 180 tablet 3    dilTIAZem (DILACOR XR) 180 MG extended release capsule Take 1 capsule by mouth 2 times daily 180 capsule 3    warfarin (COUMADIN) 2.5 MG tablet TAKE 1 TO 1 AND 1/2 TABS BY MOUTH DAILY AS DIRECTED BY [facility] CARDIOLOGY 135 tablet 3    finasteride (PROSCAR) 5 MG tablet Take 1 tablet by mouth daily 90 tablet 3    tamsulosin (FLOMAX) 0.4 MG capsule Take 1 capsule by mouth every evening 90 capsule 3    warfarin (COUMADIN) 1 MG tablet Take 1 tab daily as directed by [facility] Cardiology 90 tablet 1    Multiple Vitamin (MULTIVITAMIN ADULT PO) Take by mouth daily      warfarin (COUMADIN) 1 MG tablet Take 1 tab as directed by [facility] Cardiololgy 40 tablet 0    albuterol sulfate  (90 Base) MCG/ACT inhaler Inhale 2 puffs into the lungs every 4 hours as needed      esomeprazole (NEXIUM) 40 MG delayed release capsule Take by mouth daily      cephALEXin (KEFLEX) 250 MG capsule  (Patient not taking: Reported on 5/29/2024)       No current facility-administered medications for this visit.     Allergies   Allergen Reactions    Hydrocodone-Acetaminophen Other (See Comments)     Severe restlessness    Oxycodone-Acetaminophen Nausea And Vomiting     Past Medical History:   Diagnosis Date    Arthritis     Atrial fibrillation (HCC)     Coronary artery disease     COVID-19     Follicular thyroid carcinoma (HCC)     Hypertension     Incarcerated ventral hernia 01/28/2016    s/p ventral hernia repair (no mesh); Dr. Vyas     Melanoma of back (HCC) 2010    Microscopic hematuria     Nonrheumatic aortic valve stenosis 08/02/2024    Obstructive sleep apnea on CPAP     Papillary thyroid carcinoma (HCC)     Peptic ulcer disease     Primary hypothyroidism     Reflux esophagitis     Transient ischemic attack (TIA) 10/31/2008     Past Surgical History:   Procedure Laterality Date    CARDIAC CATHETERIZATION  1995    angioplasty    HEMORRHOID SURGERY  1990s    HERNIA REPAIR Right 1990s

## 2024-12-10 ENCOUNTER — ANTI-COAG VISIT (OUTPATIENT)
Age: 88
End: 2024-12-10

## 2024-12-10 DIAGNOSIS — I48.21 PERMANENT ATRIAL FIBRILLATION (HCC): Primary | ICD-10-CM

## 2024-12-10 DIAGNOSIS — Z79.01 LONG TERM CURRENT USE OF ANTICOAGULANT: ICD-10-CM

## 2024-12-10 LAB
POC INR: 2.2
PROTHROMBIN TIME, POC: NORMAL

## 2024-12-10 NOTE — PROGRESS NOTES
Warfarin tablet strength and weekly dosing schedule confirmed today.Therapeutic INR, patient to continue current maintenance plan (see Anticoag Dosing Calendar). INR to be rechecked in 4 week(s).

## 2024-12-10 NOTE — PATIENT INSTRUCTIONS
Reminder: Please contact the Coumadin Clinic at [phone]  when you have medication changes. Examples, new medications, antibiotics, discontinued medications, new supplements, missed doses of warfarin or if you took extra doses of warfarin.  This also includes OTC medications. Notifying us helps reduce the possibility of high and low INR's. In addition, if warfarin needs to be held for any procedures, please have surgeon or physician's office contact us before holding anticoagulant. Thanks, [facility] Cardiology Coumadin Clinic.

## 2024-12-11 ENCOUNTER — TELEPHONE (OUTPATIENT)
Age: 88
End: 2024-12-11

## 2024-12-11 DIAGNOSIS — Z45.010 PACEMAKER AT END OF BATTERY LIFE: Primary | ICD-10-CM

## 2024-12-11 NOTE — TELEPHONE ENCOUNTER
Orders placed per procedure protocol and verbal.      Educated patient on pre-procedure instructions via phone call. Sent copy of instructions to verified email address.    Understanding verbalized.

## 2024-12-18 ENCOUNTER — OFFICE VISIT (OUTPATIENT)
Dept: UROLOGY | Age: 88
End: 2024-12-18
Payer: MEDICARE

## 2024-12-18 DIAGNOSIS — N40.1 BENIGN PROSTATIC HYPERPLASIA WITH LOWER URINARY TRACT SYMPTOMS, SYMPTOM DETAILS UNSPECIFIED: Primary | ICD-10-CM

## 2024-12-18 LAB
BILIRUBIN, URINE, POC: NEGATIVE
BLOOD URINE, POC: NEGATIVE
GLUCOSE URINE, POC: NEGATIVE MG/DL
KETONES, URINE, POC: NEGATIVE MG/DL
LEUKOCYTE ESTERASE, URINE, POC: NEGATIVE
NITRITE, URINE, POC: NEGATIVE
PH, URINE, POC: 5.5 (ref 4.6–8)
PROTEIN,URINE, POC: NEGATIVE MG/DL
SPECIFIC GRAVITY, URINE, POC: 1.01 (ref 1–1.03)
URINALYSIS CLARITY, POC: NORMAL
URINALYSIS COLOR, POC: NORMAL
UROBILINOGEN, POC: NORMAL MG/DL

## 2024-12-18 PROCEDURE — 81003 URINALYSIS AUTO W/O SCOPE: CPT | Performed by: UROLOGY

## 2024-12-18 PROCEDURE — 99213 OFFICE O/P EST LOW 20 MIN: CPT | Performed by: UROLOGY

## 2024-12-18 PROCEDURE — G8428 CUR MEDS NOT DOCUMENT: HCPCS | Performed by: UROLOGY

## 2024-12-18 PROCEDURE — 1123F ACP DISCUSS/DSCN MKR DOCD: CPT | Performed by: UROLOGY

## 2024-12-18 PROCEDURE — 1036F TOBACCO NON-USER: CPT | Performed by: UROLOGY

## 2024-12-18 PROCEDURE — G8484 FLU IMMUNIZE NO ADMIN: HCPCS | Performed by: UROLOGY

## 2024-12-18 PROCEDURE — G8417 CALC BMI ABV UP PARAM F/U: HCPCS | Performed by: UROLOGY

## 2024-12-18 RX ORDER — FINASTERIDE 5 MG/1
5 TABLET, FILM COATED ORAL DAILY
Qty: 90 TABLET | Refills: 3 | Status: SHIPPED | OUTPATIENT
Start: 2024-12-18

## 2024-12-18 RX ORDER — TAMSULOSIN HYDROCHLORIDE 0.4 MG/1
0.4 CAPSULE ORAL EVERY EVENING
Qty: 90 CAPSULE | Refills: 3 | Status: SHIPPED | OUTPATIENT
Start: 2024-12-18

## 2024-12-18 ASSESSMENT — ENCOUNTER SYMPTOMS: BACK PAIN: 0

## 2024-12-18 NOTE — PROGRESS NOTES
[facility] Urology  [address] [place] [id number]  [phone]          Dilip Babin  : 1935    Chief Complaint   Patient presents with    Follow-up     yearly          HPI     Dilip Babin is a 89 y.o. male followed previously by Dr. Webb in regards to stable complex b renal cysts and BPH.  He is on tamsulosin.  he is on tamsulosin.  Last ultrasound in  revealed stability of cysts with no further imaging needed.       Finasteride was begun in .  LUTS improved.           Past Medical History:   Diagnosis Date    Arthritis     Atrial fibrillation (HCC)     Coronary artery disease     COVID-19     Follicular thyroid carcinoma (HCC)     Hypertension     Incarcerated ventral hernia 2016    s/p ventral hernia repair (no mesh); Dr. Vyas     Melanoma of back (HCC)     Microscopic hematuria     Nonrheumatic aortic valve stenosis 2024    Obstructive sleep apnea on CPAP     Papillary thyroid carcinoma (HCC)     Peptic ulcer disease     Primary hypothyroidism     Reflux esophagitis     Transient ischemic attack (TIA) 10/31/2008     Past Surgical History:   Procedure Laterality Date    CARDIAC CATHETERIZATION      angioplasty    HEMORRHOID SURGERY  1990s    HERNIA REPAIR Right 1990s    HERNIA REPAIR  2016    Ventral hernia (Dr. Vyas)    MALIGNANT SKIN LESION EXCISION      2012    MOHS SURGERY  early 's    Right side RCR    OTHER SURGICAL HISTORY Left 2020    Basal cell surgery with skin graft left nose.  Dr. Rusty Morales    PACEMAKER  2009    [employer] pacemaker     PACEMAKER  2018    replacement    THYROIDECTOMY  2020    Total thyroidectomy (Dr. Jones)    VASECTOMY       Current Outpatient Medications   Medication Sig Dispense Refill    levothyroxine (SYNTHROID) 137 MCG tablet Take 1 tablet by mouth every morning (before breakfast) 90 tablet 3    metoprolol succinate (TOPROL XL) 25 MG extended release tablet

## 2024-12-26 DIAGNOSIS — E03.9 PRIMARY HYPOTHYROIDISM: ICD-10-CM

## 2024-12-26 DIAGNOSIS — Z45.010 PACEMAKER AT END OF BATTERY LIFE: ICD-10-CM

## 2024-12-26 LAB
ANION GAP SERPL CALC-SCNC: 9 MMOL/L (ref 7–16)
BASOPHILS # BLD: 0.1 K/UL (ref 0–0.2)
BASOPHILS NFR BLD: 1 % (ref 0–2)
BUN SERPL-MCNC: 19 MG/DL (ref 8–23)
CALCIUM SERPL-MCNC: 9.4 MG/DL (ref 8.8–10.2)
CHLORIDE SERPL-SCNC: 102 MMOL/L (ref 98–107)
CO2 SERPL-SCNC: 28 MMOL/L (ref 20–29)
CREAT SERPL-MCNC: 1.24 MG/DL (ref 0.8–1.3)
DIFFERENTIAL METHOD BLD: ABNORMAL
EOSINOPHIL # BLD: 0.2 K/UL (ref 0–0.8)
EOSINOPHIL NFR BLD: 3 % (ref 0.5–7.8)
ERYTHROCYTE [DISTWIDTH] IN BLOOD BY AUTOMATED COUNT: 15.4 % (ref 11.9–14.6)
GLUCOSE SERPL-MCNC: 98 MG/DL (ref 70–99)
HCT VFR BLD AUTO: 39.8 % (ref 41.1–50.3)
HGB BLD-MCNC: 12.6 G/DL (ref 13.6–17.2)
IMM GRANULOCYTES # BLD AUTO: 0 K/UL (ref 0–0.5)
IMM GRANULOCYTES NFR BLD AUTO: 0 % (ref 0–5)
LYMPHOCYTES # BLD: 1.7 K/UL (ref 0.5–4.6)
LYMPHOCYTES NFR BLD: 26 % (ref 13–44)
MAGNESIUM SERPL-MCNC: 2.1 MG/DL (ref 1.8–2.4)
MCH RBC QN AUTO: 28.6 PG (ref 26.1–32.9)
MCHC RBC AUTO-ENTMCNC: 31.7 G/DL (ref 31.4–35)
MCV RBC AUTO: 90.2 FL (ref 82–102)
MONOCYTES # BLD: 0.6 K/UL (ref 0.1–1.3)
MONOCYTES NFR BLD: 10 % (ref 4–12)
NEUTS SEG # BLD: 3.9 K/UL (ref 1.7–8.2)
NEUTS SEG NFR BLD: 60 % (ref 43–78)
NRBC # BLD: 0 K/UL (ref 0–0.2)
PLATELET # BLD AUTO: 178 K/UL (ref 150–450)
PMV BLD AUTO: 12.7 FL (ref 9.4–12.3)
POTASSIUM SERPL-SCNC: 4.6 MMOL/L (ref 3.5–5.1)
RBC # BLD AUTO: 4.41 M/UL (ref 4.23–5.6)
SODIUM SERPL-SCNC: 139 MMOL/L (ref 136–145)
T4 FREE SERPL-MCNC: 1.9 NG/DL (ref 0.9–1.7)
TSH, 3RD GENERATION: 1.58 UIU/ML (ref 0.27–4.2)
WBC # BLD AUTO: 6.5 K/UL (ref 4.3–11.1)

## 2024-12-29 DIAGNOSIS — N40.1 BENIGN PROSTATIC HYPERPLASIA WITH LOWER URINARY TRACT SYMPTOMS, SYMPTOM DETAILS UNSPECIFIED: ICD-10-CM

## 2024-12-30 RX ORDER — TAMSULOSIN HYDROCHLORIDE 0.4 MG/1
0.4 CAPSULE ORAL EVERY EVENING
Qty: 90 CAPSULE | Refills: 3 | OUTPATIENT
Start: 2024-12-30

## 2025-01-06 ENCOUNTER — TELEPHONE (OUTPATIENT)
Age: 89
End: 2025-01-06

## 2025-01-06 ENCOUNTER — ANTI-COAG VISIT (OUTPATIENT)
Age: 89
End: 2025-01-06
Payer: MEDICARE

## 2025-01-06 DIAGNOSIS — I48.21 PERMANENT ATRIAL FIBRILLATION (HCC): Primary | ICD-10-CM

## 2025-01-06 DIAGNOSIS — Z79.01 LONG TERM CURRENT USE OF ANTICOAGULANT: ICD-10-CM

## 2025-01-06 LAB
POC INR: 1.8
PROTHROMBIN TIME, POC: NORMAL

## 2025-01-06 PROCEDURE — 85610 PROTHROMBIN TIME: CPT | Performed by: INTERNAL MEDICINE

## 2025-01-06 NOTE — TELEPHONE ENCOUNTER
Patient has procedure with Dr. Dubon scheduled for 1/10/25. Had INR check today.     INR today was 1.8 from 2.2 12/10/24.    No changes were made on 12/10/24 date.    Patient takes Coumadin for afib, 2.5 mg PO daily.    Dr. Dubon notified.     Per Dr. Dubon- patient should stop Coumadin 1/8/25. Will not restart until after procedure. Patient notified and verbalized understanding.     Michelle Ly notified.

## 2025-01-08 ENCOUNTER — TELEPHONE (OUTPATIENT)
Age: 89
End: 2025-01-08

## 2025-01-08 NOTE — TELEPHONE ENCOUNTER
Patient willing to move Friday gen change to Monday. Per Dr. Dubon hold Coumadin Saturday and Sunday. Spouse verbalized understanding.  Communicate information  to wife, John at [phone].

## 2025-01-13 ENCOUNTER — ANESTHESIA EVENT (OUTPATIENT)
Dept: CARDIAC CATH/INVASIVE PROCEDURES | Age: 89
End: 2025-01-13
Payer: MEDICARE

## 2025-01-13 ENCOUNTER — ANESTHESIA (OUTPATIENT)
Dept: CARDIAC CATH/INVASIVE PROCEDURES | Age: 89
End: 2025-01-13
Payer: MEDICARE

## 2025-01-13 ENCOUNTER — HOSPITAL ENCOUNTER (OUTPATIENT)
Age: 89
Setting detail: OBSERVATION
Discharge: HOME | End: 2025-01-14
Attending: INTERNAL MEDICINE | Admitting: INTERNAL MEDICINE
Payer: MEDICARE

## 2025-01-13 DIAGNOSIS — Z45.010 PACEMAKER BATTERY DEPLETION: Primary | ICD-10-CM

## 2025-01-13 DIAGNOSIS — Z95.0 PACEMAKER: ICD-10-CM

## 2025-01-13 DIAGNOSIS — Z45.010 PACEMAKER AT END OF BATTERY LIFE: ICD-10-CM

## 2025-01-13 LAB
ANION GAP SERPL CALC-SCNC: 10 MMOL/L (ref 7–16)
BASOPHILS # BLD: 0.1 K/UL (ref 0–0.2)
BASOPHILS NFR BLD: 1.7 % (ref 0–2)
BUN SERPL-MCNC: 17 MG/DL (ref 8–23)
CALCIUM SERPL-MCNC: 9.5 MG/DL (ref 8.8–10.2)
CHLORIDE SERPL-SCNC: 102 MMOL/L (ref 98–107)
CO2 SERPL-SCNC: 25 MMOL/L (ref 20–29)
CREAT SERPL-MCNC: 1.08 MG/DL (ref 0.8–1.3)
DIFFERENTIAL METHOD BLD: ABNORMAL
ECHO BSA: 2.05 M2
EKG ATRIAL RATE: 71 BPM
EKG DIAGNOSIS: NORMAL
EKG Q-T INTERVAL: 490 MS
EKG QRS DURATION: 192 MS
EKG QTC CALCULATION (BAZETT): 501 MS
EKG R AXIS: -84 DEGREES
EKG T AXIS: 85 DEGREES
EKG VENTRICULAR RATE: 63 BPM
EOSINOPHIL # BLD: 0.27 K/UL (ref 0–0.8)
EOSINOPHIL NFR BLD: 4.7 % (ref 0.5–7.8)
ERYTHROCYTE [DISTWIDTH] IN BLOOD BY AUTOMATED COUNT: 15.2 % (ref 11.9–14.6)
GLUCOSE SERPL-MCNC: 112 MG/DL (ref 70–99)
HCT VFR BLD AUTO: 40.6 % (ref 41.1–50.3)
HGB BLD-MCNC: 13.1 G/DL (ref 13.6–17.2)
IMM GRANULOCYTES # BLD AUTO: 0.01 K/UL (ref 0–0.5)
IMM GRANULOCYTES NFR BLD AUTO: 0.2 % (ref 0–5)
INR PPP: 1.6
LYMPHOCYTES # BLD: 1.59 K/UL (ref 0.5–4.6)
LYMPHOCYTES NFR BLD: 27.5 % (ref 13–44)
MAGNESIUM SERPL-MCNC: 2.1 MG/DL (ref 1.8–2.4)
MCH RBC QN AUTO: 29.1 PG (ref 26.1–32.9)
MCHC RBC AUTO-ENTMCNC: 32.3 G/DL (ref 31.4–35)
MCV RBC AUTO: 90.2 FL (ref 82–102)
MONOCYTES # BLD: 0.68 K/UL (ref 0.1–1.3)
MONOCYTES NFR BLD: 11.7 % (ref 4–12)
NEUTS SEG # BLD: 3.14 K/UL (ref 1.7–8.2)
NEUTS SEG NFR BLD: 54.2 % (ref 43–78)
NRBC # BLD: 0 K/UL (ref 0–0.2)
PLATELET # BLD AUTO: 169 K/UL (ref 150–450)
PMV BLD AUTO: 12 FL (ref 9.4–12.3)
POTASSIUM SERPL-SCNC: 3.9 MMOL/L (ref 3.5–5.1)
PROTHROMBIN TIME: 19.1 SEC (ref 11.3–14.9)
RBC # BLD AUTO: 4.5 M/UL (ref 4.23–5.6)
SODIUM SERPL-SCNC: 136 MMOL/L (ref 136–145)
WBC # BLD AUTO: 5.8 K/UL (ref 4.3–11.1)

## 2025-01-13 PROCEDURE — 3700000000 HC ANESTHESIA ATTENDED CARE: Performed by: INTERNAL MEDICINE

## 2025-01-13 PROCEDURE — 2500000003 HC RX 250 WO HCPCS: Performed by: NURSE ANESTHETIST, CERTIFIED REGISTERED

## 2025-01-13 PROCEDURE — 33227 REMOVE&REPLACE PM GEN SINGL: CPT | Performed by: INTERNAL MEDICINE

## 2025-01-13 PROCEDURE — 2709999900 HC NON-CHARGEABLE SUPPLY: Performed by: INTERNAL MEDICINE

## 2025-01-13 PROCEDURE — 85610 PROTHROMBIN TIME: CPT

## 2025-01-13 PROCEDURE — 6360000002 HC RX W HCPCS: Performed by: INTERNAL MEDICINE

## 2025-01-13 PROCEDURE — 6370000000 HC RX 637 (ALT 250 FOR IP): Performed by: INTERNAL MEDICINE

## 2025-01-13 PROCEDURE — 6370000000 HC RX 637 (ALT 250 FOR IP)

## 2025-01-13 PROCEDURE — 33228 REMV&REPLC PM GEN DUAL LEAD: CPT | Performed by: INTERNAL MEDICINE

## 2025-01-13 PROCEDURE — 85025 COMPLETE CBC W/AUTO DIFF WBC: CPT

## 2025-01-13 PROCEDURE — 80048 BASIC METABOLIC PNL TOTAL CA: CPT

## 2025-01-13 PROCEDURE — G0378 HOSPITAL OBSERVATION PER HR: HCPCS

## 2025-01-13 PROCEDURE — 3700000001 HC ADD 15 MINUTES (ANESTHESIA): Performed by: INTERNAL MEDICINE

## 2025-01-13 PROCEDURE — C1785 PMKR, DUAL, RATE-RESP: HCPCS | Performed by: INTERNAL MEDICINE

## 2025-01-13 PROCEDURE — 6360000002 HC RX W HCPCS: Performed by: ANESTHESIOLOGY

## 2025-01-13 PROCEDURE — 93010 ELECTROCARDIOGRAM REPORT: CPT | Performed by: INTERNAL MEDICINE

## 2025-01-13 PROCEDURE — 2500000003 HC RX 250 WO HCPCS: Performed by: INTERNAL MEDICINE

## 2025-01-13 PROCEDURE — 83735 ASSAY OF MAGNESIUM: CPT

## 2025-01-13 PROCEDURE — 2720000010 HC SURG SUPPLY STERILE: Performed by: INTERNAL MEDICINE

## 2025-01-13 PROCEDURE — 6360000002 HC RX W HCPCS: Performed by: NURSE ANESTHETIST, CERTIFIED REGISTERED

## 2025-01-13 PROCEDURE — 93005 ELECTROCARDIOGRAM TRACING: CPT | Performed by: INTERNAL MEDICINE

## 2025-01-13 DEVICE — DR PACEMAKER DDDR
Type: IMPLANTABLE DEVICE | Status: FUNCTIONAL
Brand: ASSURITY MRI™

## 2025-01-13 RX ORDER — LIDOCAINE HYDROCHLORIDE AND EPINEPHRINE 10; 10 MG/ML; UG/ML
INJECTION, SOLUTION INFILTRATION; PERINEURAL PRN
Status: DISCONTINUED | OUTPATIENT
Start: 2025-01-13 | End: 2025-01-13 | Stop reason: HOSPADM

## 2025-01-13 RX ORDER — HYDRALAZINE HYDROCHLORIDE 25 MG/1
25 TABLET, FILM COATED ORAL EVERY 12 HOURS SCHEDULED
Status: DISCONTINUED | OUTPATIENT
Start: 2025-01-13 | End: 2025-01-14 | Stop reason: HOSPADM

## 2025-01-13 RX ORDER — SODIUM CHLORIDE 0.9 % (FLUSH) 0.9 %
SYRINGE (ML) INJECTION
Status: DISCONTINUED | OUTPATIENT
Start: 2025-01-13 | End: 2025-01-13 | Stop reason: SDUPTHER

## 2025-01-13 RX ORDER — NALOXONE HYDROCHLORIDE 0.4 MG/ML
INJECTION, SOLUTION INTRAMUSCULAR; INTRAVENOUS; SUBCUTANEOUS PRN
Status: DISCONTINUED | OUTPATIENT
Start: 2025-01-13 | End: 2025-01-13 | Stop reason: HOSPADM

## 2025-01-13 RX ORDER — SODIUM CHLORIDE 0.9 % (FLUSH) 0.9 %
5-40 SYRINGE (ML) INJECTION EVERY 12 HOURS SCHEDULED
Status: DISCONTINUED | OUTPATIENT
Start: 2025-01-13 | End: 2025-01-13

## 2025-01-13 RX ORDER — SODIUM CHLORIDE 0.9 % (FLUSH) 0.9 %
5-40 SYRINGE (ML) INJECTION PRN
Status: DISCONTINUED | OUTPATIENT
Start: 2025-01-13 | End: 2025-01-13

## 2025-01-13 RX ORDER — METOPROLOL SUCCINATE 25 MG/1
25 TABLET, EXTENDED RELEASE ORAL DAILY
Status: DISCONTINUED | OUTPATIENT
Start: 2025-01-13 | End: 2025-01-14 | Stop reason: HOSPADM

## 2025-01-13 RX ORDER — LISINOPRIL 20 MG/1
40 TABLET ORAL 2 TIMES DAILY
Status: DISCONTINUED | OUTPATIENT
Start: 2025-01-13 | End: 2025-01-14 | Stop reason: HOSPADM

## 2025-01-13 RX ORDER — SODIUM CHLORIDE 9 MG/ML
INJECTION, SOLUTION INTRAVENOUS PRN
Status: DISCONTINUED | OUTPATIENT
Start: 2025-01-13 | End: 2025-01-13

## 2025-01-13 RX ORDER — KETOROLAC TROMETHAMINE 15 MG/ML
15 INJECTION, SOLUTION INTRAMUSCULAR; INTRAVENOUS
Status: COMPLETED | OUTPATIENT
Start: 2025-01-13 | End: 2025-01-13

## 2025-01-13 RX ORDER — LIDOCAINE HYDROCHLORIDE 10 MG/ML
1 INJECTION, SOLUTION INFILTRATION; PERINEURAL
Status: DISCONTINUED | OUTPATIENT
Start: 2025-01-13 | End: 2025-01-13

## 2025-01-13 RX ORDER — PANTOPRAZOLE SODIUM 40 MG/1
40 TABLET, DELAYED RELEASE ORAL
Status: DISCONTINUED | OUTPATIENT
Start: 2025-01-14 | End: 2025-01-14 | Stop reason: HOSPADM

## 2025-01-13 RX ORDER — PROPOFOL 10 MG/ML
INJECTION, EMULSION INTRAVENOUS
Status: DISCONTINUED | OUTPATIENT
Start: 2025-01-13 | End: 2025-01-13 | Stop reason: SDUPTHER

## 2025-01-13 RX ORDER — SODIUM CHLORIDE, SODIUM LACTATE, POTASSIUM CHLORIDE, CALCIUM CHLORIDE 600; 310; 30; 20 MG/100ML; MG/100ML; MG/100ML; MG/100ML
INJECTION, SOLUTION INTRAVENOUS CONTINUOUS
Status: DISCONTINUED | OUTPATIENT
Start: 2025-01-13 | End: 2025-01-13 | Stop reason: HOSPADM

## 2025-01-13 RX ORDER — DILTIAZEM HYDROCHLORIDE 180 MG/1
180 CAPSULE, COATED, EXTENDED RELEASE ORAL 2 TIMES DAILY
Status: DISCONTINUED | OUTPATIENT
Start: 2025-01-13 | End: 2025-01-14 | Stop reason: HOSPADM

## 2025-01-13 RX ORDER — TAMSULOSIN HYDROCHLORIDE 0.4 MG/1
0.4 CAPSULE ORAL EVERY EVENING
Status: DISCONTINUED | OUTPATIENT
Start: 2025-01-13 | End: 2025-01-14 | Stop reason: HOSPADM

## 2025-01-13 RX ORDER — OXYCODONE HYDROCHLORIDE 5 MG/1
5 TABLET ORAL
Status: DISCONTINUED | OUTPATIENT
Start: 2025-01-13 | End: 2025-01-13 | Stop reason: HOSPADM

## 2025-01-13 RX ORDER — LIDOCAINE HYDROCHLORIDE 20 MG/ML
INJECTION, SOLUTION EPIDURAL; INFILTRATION; INTRACAUDAL; PERINEURAL
Status: DISCONTINUED | OUTPATIENT
Start: 2025-01-13 | End: 2025-01-13 | Stop reason: SDUPTHER

## 2025-01-13 RX ORDER — ACETAMINOPHEN 500 MG
1000 TABLET ORAL EVERY 8 HOURS PRN
Status: DISCONTINUED | OUTPATIENT
Start: 2025-01-13 | End: 2025-01-14 | Stop reason: HOSPADM

## 2025-01-13 RX ORDER — HYDRALAZINE HYDROCHLORIDE 20 MG/ML
10 INJECTION INTRAMUSCULAR; INTRAVENOUS EVERY 8 HOURS PRN
Status: DISCONTINUED | OUTPATIENT
Start: 2025-01-13 | End: 2025-01-14 | Stop reason: HOSPADM

## 2025-01-13 RX ORDER — OXYCODONE HYDROCHLORIDE 5 MG/1
5 TABLET ORAL EVERY 4 HOURS PRN
Status: DISCONTINUED | OUTPATIENT
Start: 2025-01-13 | End: 2025-01-14 | Stop reason: HOSPADM

## 2025-01-13 RX ORDER — ONDANSETRON 2 MG/ML
4 INJECTION INTRAMUSCULAR; INTRAVENOUS
Status: COMPLETED | OUTPATIENT
Start: 2025-01-13 | End: 2025-01-13

## 2025-01-13 RX ORDER — FINASTERIDE 5 MG/1
5 TABLET, FILM COATED ORAL DAILY
Status: DISCONTINUED | OUTPATIENT
Start: 2025-01-13 | End: 2025-01-14 | Stop reason: HOSPADM

## 2025-01-13 RX ADMIN — DILTIAZEM HYDROCHLORIDE 180 MG: 180 CAPSULE, EXTENDED RELEASE ORAL at 21:15

## 2025-01-13 RX ADMIN — CEFAZOLIN 2000 MG: 2 INJECTION, POWDER, FOR SOLUTION INTRAMUSCULAR; INTRAVENOUS at 10:09

## 2025-01-13 RX ADMIN — PROPOFOL 30 MG: 10 INJECTION, EMULSION INTRAVENOUS at 09:57

## 2025-01-13 RX ADMIN — FINASTERIDE 5 MG: 5 TABLET, FILM COATED ORAL at 16:37

## 2025-01-13 RX ADMIN — LISINOPRIL 40 MG: 20 TABLET ORAL at 21:15

## 2025-01-13 RX ADMIN — METOPROLOL SUCCINATE 25 MG: 25 TABLET, EXTENDED RELEASE ORAL at 16:37

## 2025-01-13 RX ADMIN — ACETAMINOPHEN 1000 MG: 500 TABLET, FILM COATED ORAL at 21:15

## 2025-01-13 RX ADMIN — SODIUM CHLORIDE, PRESERVATIVE FREE 30 ML: 5 INJECTION INTRAVENOUS at 10:05

## 2025-01-13 RX ADMIN — TAMSULOSIN HYDROCHLORIDE 0.4 MG: 0.4 CAPSULE ORAL at 17:52

## 2025-01-13 RX ADMIN — ONDANSETRON 4 MG: 2 INJECTION, SOLUTION INTRAMUSCULAR; INTRAVENOUS at 11:06

## 2025-01-13 RX ADMIN — SODIUM CHLORIDE, PRESERVATIVE FREE 30 ML: 5 INJECTION INTRAVENOUS at 10:35

## 2025-01-13 RX ADMIN — PROPOFOL 120 MCG/KG/MIN: 10 INJECTION, EMULSION INTRAVENOUS at 09:57

## 2025-01-13 RX ADMIN — HYDRALAZINE HYDROCHLORIDE 25 MG: 25 TABLET ORAL at 18:32

## 2025-01-13 RX ADMIN — ACETAMINOPHEN 1000 MG: 500 TABLET, FILM COATED ORAL at 12:34

## 2025-01-13 RX ADMIN — SODIUM CHLORIDE, PRESERVATIVE FREE 30 ML: 5 INJECTION INTRAVENOUS at 10:15

## 2025-01-13 RX ADMIN — LIDOCAINE HYDROCHLORIDE 60 MG: 20 INJECTION, SOLUTION EPIDURAL; INFILTRATION; INTRACAUDAL; PERINEURAL at 09:57

## 2025-01-13 RX ADMIN — KETOROLAC TROMETHAMINE 15 MG: 15 INJECTION, SOLUTION INTRAMUSCULAR; INTRAVENOUS at 11:23

## 2025-01-13 RX ADMIN — HYDROMORPHONE HYDROCHLORIDE 0.5 MG: 0.5 INJECTION, SOLUTION INTRAMUSCULAR; INTRAVENOUS; SUBCUTANEOUS at 10:55

## 2025-01-13 RX ADMIN — PROPOFOL 20 MG: 10 INJECTION, EMULSION INTRAVENOUS at 10:10

## 2025-01-13 ASSESSMENT — PAIN DESCRIPTION - LOCATION
LOCATION: INCISION

## 2025-01-13 ASSESSMENT — PAIN DESCRIPTION - DESCRIPTORS
DESCRIPTORS: ACHING;DISCOMFORT
DESCRIPTORS: ACHING
DESCRIPTORS: DISCOMFORT
DESCRIPTORS: DISCOMFORT

## 2025-01-13 ASSESSMENT — PAIN DESCRIPTION - ORIENTATION
ORIENTATION: LEFT
ORIENTATION: LEFT

## 2025-01-13 ASSESSMENT — PAIN SCALES - GENERAL
PAINLEVEL_OUTOF10: 10
PAINLEVEL_OUTOF10: 6
PAINLEVEL_OUTOF10: 7
PAINLEVEL_OUTOF10: 5

## 2025-01-13 NOTE — DISCHARGE INSTRUCTIONS
Post Op Generator Change Instructions        Incision & Dressing Care   Please keep Aquacel dressing on wound until your 2 week follow up in device clinic. The dressing promotes healing and is meant to stay on the wound. Keep incision site completely dry for 48 hours. During this time you may take a sponge bath, but no shower. After 48 hours you may shower with your back to the water letting the water run over the dressing. Do not let the water directly hit the dressing, it is water resistant no water proof. Do not use any lotions, creams, or ointments on the incision.    Avoid manipulating the device or leads with your fingers. Do not massage or excessively rub the implant site. This could displace the leads           Call you doctor for any of the following:   Any signs of infection, including redness, swelling or pain at the incision site, or a   temperature of 101° F or greater.   If you have twitching chest muscles, hiccups that won't stop, or a swollen arm on the   side of the incision.   Any feelings of light-headedness, chest pain, or shortness of breath.             Please call the Device Clinic at  [phone] if you have questions or concerns about your device. Please call the hospital if it is after 5 pm or the weekend please page the on call cardiologist at [facility] at [phone]         Follow Up Appointments  You will need to have your device checked 1- 2 weeks after the procedure and should have an appointment with the device clinic. If you do not hear from them call the device clinic.      Sedation for a Medical Procedure: Care Instructions     You were given a sedative medication during your visit. While many of the effects will have worn   off before you leave; you may continue to feel some effects for several hours.      Common side effects from sedation include:  Feeling sleepy. (Your doctors and nurses will make sure you are not too sleepy to go home.)  Nausea and vomiting.

## 2025-01-13 NOTE — ANESTHESIA PRE PROCEDURE
Nonrheumatic aortic valve stenosis 2024   • Obstructive sleep apnea on CPAP    • Papillary thyroid carcinoma (HCC)    • Peptic ulcer disease    • Primary hypothyroidism    • Reflux esophagitis    • Transient ischemic attack (TIA) 10/31/2008       Past Surgical History:        Procedure Laterality Date   • CARDIAC CATHETERIZATION      angioplasty   • HEMORRHOID SURGERY     • HERNIA REPAIR Right    • HERNIA REPAIR  2016    Ventral hernia (Dr. Vyas)   • MALIGNANT SKIN LESION EXCISION         • MOHS SURGERY  early     Right side RCR   • OTHER SURGICAL HISTORY Left 2020    Basal cell surgery with skin graft left nose.  Dr. Rusty Morales   • PACEMAKER  2009    [employer] pacemaker    • PACEMAKER  2018    replacement   • THYROIDECTOMY  2020    Total thyroidectomy (Dr. Jones)   • VASECTOMY         Social History:    Social History     Tobacco Use   • Smoking status: Former     Current packs/day: 0.00     Types: Cigarettes     Quit date: 1969     Years since quittin.0   • Smokeless tobacco: Never   Substance Use Topics   • Alcohol use: No                                Counseling given: Not Answered      Vital Signs (Current): There were no vitals filed for this visit.                                           BP Readings from Last 3 Encounters:   24 138/64   24 126/72   10/29/24 118/76       NPO Status: Time of last liquid consumption: 0000                        Time of last solid consumption: 1800                        Date of last liquid consumption: 25                        Date of last solid food consumption: 25    BMI:   Wt Readings from Last 3 Encounters:   24 86.3 kg (190 lb 3.2 oz)   24 87.5 kg (193 lb)   10/29/24 85.7 kg (189 lb)     There is no height or weight on file to calculate BMI.    CBC:   Lab Results   Component Value Date/Time    WBC 6.5 2024 11:46 AM    RBC 4.41 2024 11:46 AM    HGB 12.6

## 2025-01-13 NOTE — ANESTHESIA POSTPROCEDURE EVALUATION
Department of Anesthesiology  Postprocedure Note    Patient: Dilip Babin  MRN: [id number]  YOB: 1935  Date of evaluation: 1/13/2025    Procedure Summary       Date: 01/13/25 Room / Location: [facility] 2 ALL EVENTS / [facility] CARDIAC CATH LAB    Anesthesia Start: 0948 Anesthesia Stop: 1048    Procedure: Remove & replace PPM gen single lead Diagnosis:       Pacemaker at end of battery life      (Pacemaker at end of battery life [Z45.010])    Providers: Robert Dubon MD Responsible Provider: Dilpi Contreras Jr., MD    Anesthesia Type: TIVA ASA Status: 3            Anesthesia Type: No value filed.    Danielito Phase I: Danielito Score: 10    Danielito Phase II:      Anesthesia Post Evaluation    Patient location during evaluation: PACU  Patient participation: complete - patient participated  Level of consciousness: awake  Pain score: 0  Airway patency: patent  Nausea & Vomiting: no nausea and no vomiting  Cardiovascular status: blood pressure returned to baseline and hemodynamically stable  Respiratory status: acceptable, spontaneous ventilation and nonlabored ventilation  Hydration status: euvolemic  Multimodal analgesia pain management approach  Pain management: adequate    There were no known notable events for this encounter.

## 2025-01-13 NOTE — H&P
there are risks of large blood vessel injury, lung or cardiac injury, venous occlusion, pneumothorax, cardiac tamponade, perforation, need for urgent open heart surgery or additional procedures, device/lead failure, lead dislodgement, risks of anesthesia that will be discussed in more detail the day of the procedure and even death.  After our comprehensive discussion, the patient would like to proceed.       Robert Dubon MD, MS  Cardiology/Electrophysiology

## 2025-01-14 VITALS
WEIGHT: 177.6 LBS | BODY MASS INDEX: 24.86 KG/M2 | DIASTOLIC BLOOD PRESSURE: 81 MMHG | HEIGHT: 71 IN | SYSTOLIC BLOOD PRESSURE: 145 MMHG | HEART RATE: 105 BPM | RESPIRATION RATE: 16 BRPM | TEMPERATURE: 97.9 F | OXYGEN SATURATION: 95 %

## 2025-01-14 PROBLEM — Z45.010 PACEMAKER BATTERY DEPLETION: Status: RESOLVED | Noted: 2025-01-13 | Resolved: 2025-01-14

## 2025-01-14 PROBLEM — Z45.010 PACEMAKER AT END OF BATTERY LIFE: Status: RESOLVED | Noted: 2024-12-11 | Resolved: 2025-01-14

## 2025-01-14 PROCEDURE — 6360000002 HC RX W HCPCS

## 2025-01-14 PROCEDURE — G0378 HOSPITAL OBSERVATION PER HR: HCPCS

## 2025-01-14 PROCEDURE — 96374 THER/PROPH/DIAG INJ IV PUSH: CPT

## 2025-01-14 PROCEDURE — 6370000000 HC RX 637 (ALT 250 FOR IP): Performed by: INTERNAL MEDICINE

## 2025-01-14 RX ORDER — OXYCODONE HYDROCHLORIDE 5 MG/1
5 TABLET ORAL EVERY 6 HOURS PRN
Qty: 12 TABLET | Refills: 0 | Status: SHIPPED | OUTPATIENT
Start: 2025-01-14 | End: 2025-01-17

## 2025-01-14 RX ADMIN — PANTOPRAZOLE SODIUM 40 MG: 40 TABLET, DELAYED RELEASE ORAL at 05:08

## 2025-01-14 RX ADMIN — DILTIAZEM HYDROCHLORIDE 180 MG: 180 CAPSULE, EXTENDED RELEASE ORAL at 07:56

## 2025-01-14 RX ADMIN — LISINOPRIL 40 MG: 20 TABLET ORAL at 07:57

## 2025-01-14 RX ADMIN — METOPROLOL SUCCINATE 25 MG: 25 TABLET, EXTENDED RELEASE ORAL at 07:56

## 2025-01-14 RX ADMIN — ACETAMINOPHEN 1000 MG: 500 TABLET, FILM COATED ORAL at 09:20

## 2025-01-14 RX ADMIN — FINASTERIDE 5 MG: 5 TABLET, FILM COATED ORAL at 07:56

## 2025-01-14 RX ADMIN — HYDRALAZINE HYDROCHLORIDE 10 MG: 20 INJECTION INTRAMUSCULAR; INTRAVENOUS at 00:35

## 2025-01-14 RX ADMIN — LEVOTHYROXINE SODIUM 137 MCG: 0.05 TABLET ORAL at 05:08

## 2025-01-14 ASSESSMENT — PAIN DESCRIPTION - LOCATION: LOCATION: SHOULDER

## 2025-01-14 ASSESSMENT — PAIN SCALES - GENERAL: PAINLEVEL_OUTOF10: 5

## 2025-01-14 NOTE — PROGRESS NOTES
4 Eyes Skin Assessment     NAME:  Dilip Babin  YOB: 1935  MEDICAL RECORD NUMBER:  [id number]    The patient is being assessed for  Admission    I agree that at least one RN has performed a thorough Head to Toe Skin Assessment on the patient. ALL assessment sites listed below have been assessed.      Areas assessed by both nurses:    Shoulders, Back, Chest, Arms, Elbows, Hands, Sacrum. Buttock, Coccyx, Ischium, and Legs. Feet and Heels  L subclavian site s/p pacemaker generator change  Does the Patient have a Wound? No noted wound(s)       Buddy Prevention initiated by RN: No  Wound Care Orders initiated by RN: No    Pressure Injury (Stage 3,4, Unstageable, DTI, NWPT, and Complex wounds) if present, place Wound referral order by RN under : No    New Ostomies, if present place, Ostomy referral order under : No     Nurse 1 eSignature: Electronically signed by ADELINA DURÁN RN on 1/13/25 at 4:28 PM EST    **SHARE this note so that the co-signing nurse can place an eSignature**    Nurse 2 eSignature: Electronically signed by CLINT AMES RN on 1/13/25 at 5:22 PM EST   
Discharge instructions were reviewed with patient. An opportunity was given for questions. All medications were reviewed, and information was given on the new medications. Patient verbalized understanding, and has no questions at this time.    
Incision has had a slow ooze. Aquacel dressing marked, about 3 cm wide by 1 cm height  
Patient ambulated to bathroom with assistance and voided without difficulty. Left subclavian site stable.  
Patient pre-assessment complete for device battery change scheduled for 25, arrival time 0730. Patient verified using . Patient instructed to bring a list of all home medications on the day of procedure. NPO status reinforced.  Patient instructed to HOLD Coumadin, Flomax, Lisinopril, and Lasix. Instructed they can take all other medications excluding vitamins & supplements. Patient verbalizes understanding of all instructions & denies any questions at this time.      Per Dr. Dubon- patient needs to hold Coumadin starting Saturday. Understanding verbalized.        
Patient received to CPRU room # 9  Ambulatory from [facility]. Patient scheduled for GEN CHANGE today with Dr Dubon. Procedure reviewed & questions answered, voiced good understanding consent obtained & placed on chart. All medications and medical history reviewed. Will prep patient per orders. Patient & family updated on plan of care.      The patient has a fraility score of 3-MANAGING WELL, based on ambulation.   
Report received from Chandra COLINDRES. Procedural findings communicated. Intra procedural  medication administration reviewed. Progression of care discussed.     Patient received into CPRU [place] 7 post sheath removal.     Access site without bleeding or swelling yes    Dressing dry and intact yes    Patient instructed to limit movement to left upper extremity    Routine post procedural vital signs and site assessment initiated yes      
Spiritual Health History and Assessment/Progress Note  [facility]    (P) Spiritual/Emotional Needs,  ,  ,      Name: Dilip Babin MRN: [id number]    Age: 89 y.o.     Sex: male   Language: English   Mandaeism: Quaker   Pacemaker at end of battery life     Date: 1/14/2025            Total Time Calculated: (P) 10 min              Spiritual Assessment began in [facility] 4 TELEMETRY        Referral/Consult From: (P) Rounding   Encounter Overview/Reason: (P) Spiritual/Emotional Needs  Service Provided For: (P) Patient and family together    Aurea, Belief, Meaning:   Patient identifies as spiritual, is connected with a aurea tradition or spiritual practice, and has beliefs or practices that help with coping during difficult times  Family/Friends identify as spiritual, are connected with a aurea tradition or spiritual practice, and have beliefs or practices that help with coping during difficult times      Importance and Influence:  Patient has no beliefs influential to healthcare decision-making identified during this visit  Family/Friends have no beliefs influential to healthcare decision-making identified during this visit    Community:  Patient is connected with a spiritual community  Family/Friends are connected with a spiritual community:    Assessment and Plan of Care:     Patient Interventions include: Facilitated expression of thoughts and feelings and Affirmed coping skills/support systems  Family/Friends Interventions include: Facilitated expression of thoughts and feelings and Affirmed coping skills/support systems    Patient Plan of Care: Spiritual Care available upon further referral  Family/Friends Plan of Care: Spiritual Care available upon further referral    Electronically signed by KANU AARON on 1/14/2025 at 10:57 AM    
TRANSFER - IN REPORT:    Verbal report received from NATY Buckley on Dilip Babin being received from [facility] Cath Lab  for routine progression of care.     Report consisted of patient’s Situation, Background, Assessment and Recommendations(SBAR).     Information from the following report(s) SBAR, Procedure Summary, and Cardiac Rhythm V-paced  was reviewed. Opportunity for questions and clarification was provided.      Assessment completed upon patient’s arrival to unit and care assumed.     Patient received to room 436. Patient connected to monitor and assessment completed. Plan of care reviewed. Patient oriented to room and call light. Patient aware to use call light to communicate any chest pain or needs.     
TRANSFER - OUT REPORT:    Verbal report given to Mayra ALFONSO on Dilip Babin  being transferred to Critical access hospital for routine progression of patient care       Report consisted of patient's Situation, Background, Assessment and   Recommendations(SBAR).     Information from the following report(s) Nurse Handoff Report was reviewed with the receiving nurse.           Lines:   Peripheral IV 01/13/25 Distal;Left Forearm (Active)        Opportunity for questions and clarification was provided.      Patient transported with:  Registered Nurse        
This RN received report from NATY Buckley in cath lab. This RN can not take patient due to name interference with a patient this RN is taking care of on 4th floor. Bedside report given to Yamia ALFONSO who will be assuming care.   
Oral QPM    hydrALAZINE (APRESOLINE) tablet 25 mg  25 mg Oral 2 times per day    hydrALAZINE (APRESOLINE) injection 10 mg  10 mg IntraVENous Q8H PRN    oxyCODONE (ROXICODONE) immediate release tablet 5 mg  5 mg Oral Q4H PRN    melatonin tablet 3 mg  3 mg Oral Nightly PRN     Data Review:   Recent Results (from the past 24 hour(s))   Basic Metabolic Panel    Collection Time: 01/13/25  7:52 AM   Result Value Ref Range    Sodium 136 136 - 145 mmol/L    Potassium 3.9 3.5 - 5.1 mmol/L    Chloride 102 98 - 107 mmol/L    CO2 25 20 - 29 mmol/L    Anion Gap 10 7 - 16 mmol/L    Glucose 112 (H) 70 - 99 mg/dL    BUN 17 8 - 23 MG/DL    Creatinine 1.08 0.80 - 1.30 MG/DL    Est, Glom Filt Rate 66 >60 ml/min/1.73m2    Calcium 9.5 8.8 - 10.2 MG/DL   CBC with Auto Differential    Collection Time: 01/13/25  7:52 AM   Result Value Ref Range    WBC 5.8 4.3 - 11.1 K/uL    RBC 4.50 4.23 - 5.6 M/uL    Hemoglobin 13.1 (L) 13.6 - 17.2 g/dL    Hematocrit 40.6 (L) 41.1 - 50.3 %    MCV 90.2 82 - 102 FL    MCH 29.1 26.1 - 32.9 PG    MCHC 32.3 31.4 - 35.0 g/dL    RDW 15.2 (H) 11.9 - 14.6 %    Platelets 169 150 - 450 K/uL    MPV 12.0 9.4 - 12.3 FL    nRBC 0.00 0.0 - 0.2 K/uL    Differential Type AUTOMATED      Neutrophils % 54.2 43.0 - 78.0 %    Lymphocytes % 27.5 13.0 - 44.0 %    Monocytes % 11.7 4.0 - 12.0 %    Eosinophils % 4.7 0.5 - 7.8 %    Basophils % 1.7 0.0 - 2.0 %    Immature Granulocytes % 0.2 0.0 - 5.0 %    Neutrophils Absolute 3.14 1.70 - 8.20 K/UL    Lymphocytes Absolute 1.59 0.50 - 4.60 K/UL    Monocytes Absolute 0.68 0.10 - 1.30 K/UL    Eosinophils Absolute 0.27 0.00 - 0.80 K/UL    Basophils Absolute 0.10 0.00 - 0.20 K/UL    Immature Granulocytes Absolute 0.01 0.0 - 0.5 K/UL   Magnesium    Collection Time: 01/13/25  7:52 AM   Result Value Ref Range    Magnesium 2.1 1.8 - 2.4 mg/dL   Protime-INR    Collection Time: 01/13/25  8:17 AM   Result Value Ref Range    Protime 19.1 (H) 11.3 - 14.9 sec    INR 1.6     EKG 12 Lead    Collection

## 2025-01-14 NOTE — DISCHARGE SUMMARY
[facility] Cardiology Discharge Summary     Patient ID:  Dilip Babin  [id number]  89 y.o.  1935    Admit date: 1/13/2025    Discharge date and time:  1/14/25    Admitting Physician: Robert Dubon MD     Discharge Physician: JEFFERY Chu/    Admission Diagnoses: Pacemaker at end of battery life [Z45.010]  Pacemaker [Z95.0]  Pacemaker battery depletion [Z45.010]    Discharge Diagnoses:     Principal Problem:    Pacemaker at end of battery life  Active Problems:    Pacemaker    Pacemaker battery depletion  Resolved Problems:    * No resolved hospital problems. *        Cardiology Procedures this admission:  Pacemaker Implantation, CXR  Consults: none    Hospital Course: Pt was admitted with [facility] PM at [facility] . Pt was scheduled for an AM gen change at [facility] on 1-13-25. Pt came in to [facility] and was taken to the cath lab by Dr. Dubon. Pt dual chamber PM generator was changed without complication. Follow up CXR showed no pneumothorax. Pt tolerated the procedure well and was taken to the telemetry floor for recovery.     The following morning Pt was up feeling well without any complaints of CP, SOB or palpitations. Pt's left subclavian PM site was clean, dry and intact without hematoma. Pt's labs were WNL. Pt was seen and examined by Dr. Dubon and determined stable and ready for discharge. Pt was instructed to follow PM discharge instructions given by nursing staff.  F/U for device interrogation appointment request sent to the office.  They will call the patient to schedule follow up.      DISPOSITION: The patient is being discharged home in stable condition on a low saturated fat, low cholesterol and low salt diet. Pt is instructed to advance activities as tolerated limited to fatigue or shortness of breath. Pt is instructed not to lift anything over 5-10 lbs with affected arm. No pushing or pulling or lifting arm above shoulder. See complete discharge instructions. Pt is

## 2025-01-14 NOTE — PLAN OF CARE
Problem: Discharge Planning  Goal: Discharge to home or other facility with appropriate resources  1/14/2025 1036 by Lacey Paul RN  Outcome: Completed  1/14/2025 1026 by Nikki Adorno RN  Outcome: Adequate for Discharge  1/13/2025 2227 by Yaima Morgan RN  Outcome: Progressing     Problem: ABCDS Injury Assessment  Goal: Absence of physical injury  1/14/2025 1036 by Lacey Paul RN  Outcome: Completed  1/14/2025 1026 by Nikki Adorno RN  Outcome: Adequate for Discharge  1/13/2025 2227 by Yaima Morgan RN  Outcome: Progressing     Problem: Pain  Goal: Verbalizes/displays adequate comfort level or baseline comfort level  1/14/2025 1036 by Lacey Paul RN  Outcome: Completed  1/14/2025 1026 by Nikki Adorno RN  Outcome: Adequate for Discharge  1/13/2025 2227 by Yaima Morgan RN  Outcome: Progressing     Problem: Safety - Adult  Goal: Free from fall injury  1/14/2025 1036 by Lacey Paul RN  Outcome: Completed  1/14/2025 1026 by Nikki Adorno RN  Outcome: Adequate for Discharge

## 2025-01-14 NOTE — PLAN OF CARE
Problem: Discharge Planning  Goal: Discharge to home or other facility with appropriate resources  1/14/2025 1026 by Nikki Adorno RN  Outcome: Adequate for Discharge  1/13/2025 2227 by Yaima Morgan RN  Outcome: Progressing     Problem: ABCDS Injury Assessment  Goal: Absence of physical injury  1/14/2025 1026 by Nikki Adorno RN  Outcome: Adequate for Discharge  1/13/2025 2227 by Yaima Morgan RN  Outcome: Progressing     Problem: Pain  Goal: Verbalizes/displays adequate comfort level or baseline comfort level  1/14/2025 1026 by Nikki Adorno RN  Outcome: Adequate for Discharge  1/13/2025 2227 by Yaima Morgan RN  Outcome: Progressing     Problem: Safety - Adult  Goal: Free from fall injury  Outcome: Adequate for Discharge

## 2025-01-15 ENCOUNTER — TELEPHONE (OUTPATIENT)
Age: 89
End: 2025-01-15

## 2025-01-15 NOTE — TELEPHONE ENCOUNTER
Patients wife called stating her  has the following needs :    Review post procedure medications  Clarify what he is to take  Doesn't want to take oxycodone  Question on Keflex    Please call and advise

## 2025-01-15 NOTE — TELEPHONE ENCOUNTER
Patient's wife had concerns about oxycodone. Tylenol has been controlling pain.  Informed her patient doesn't have to take oxycodone.     She inquired about kelfex and needing antibiotic.  Informed her he doesn't need an antibiotic and keflex was a med he hasn't taken for some time and it was removed off his list at the hospital. Suggested she reach out to urology to confirm he doesn't need to take.     She verbalized understanding and will call with anything additional.

## 2025-01-16 ENCOUNTER — ANTI-COAG VISIT (OUTPATIENT)
Age: 89
End: 2025-01-16
Payer: MEDICARE

## 2025-01-16 DIAGNOSIS — Z79.01 LONG TERM CURRENT USE OF ANTICOAGULANT: ICD-10-CM

## 2025-01-16 DIAGNOSIS — I48.21 PERMANENT ATRIAL FIBRILLATION (HCC): Primary | ICD-10-CM

## 2025-01-16 LAB
POC INR: 1.3
PROTHROMBIN TIME, POC: NORMAL

## 2025-01-16 PROCEDURE — 93793 ANTICOAG MGMT PT WARFARIN: CPT | Performed by: INTERNAL MEDICINE

## 2025-01-16 PROCEDURE — 85610 PROTHROMBIN TIME: CPT | Performed by: INTERNAL MEDICINE

## 2025-01-16 NOTE — PATIENT INSTRUCTIONS
Reminder: Please contact the Coumadin Clinic at [phone]  when you have medication changes. Examples, new medications, antibiotics, discontinued medications, new supplements, missed doses of warfarin or if you took extra doses of warfarin.  This also includes OTC medications. Notifying us helps reduce the possibility of high and low INR's. In addition, if warfarin needs to be held for any procedures, please have surgeon or physician's office contact us before holding anticoagulant. Thanks, [facility] Cardiology Coumadin Clinic.

## 2025-01-23 ENCOUNTER — ANTI-COAG VISIT (OUTPATIENT)
Age: 89
End: 2025-01-23
Payer: MEDICARE

## 2025-01-23 DIAGNOSIS — I48.21 PERMANENT ATRIAL FIBRILLATION (HCC): Primary | ICD-10-CM

## 2025-01-23 DIAGNOSIS — Z79.01 LONG TERM CURRENT USE OF ANTICOAGULANT: ICD-10-CM

## 2025-01-23 LAB
POC INR: 2.4
PROTHROMBIN TIME, POC: NORMAL

## 2025-01-23 PROCEDURE — 85610 PROTHROMBIN TIME: CPT | Performed by: INTERNAL MEDICINE

## 2025-01-23 PROCEDURE — 93793 ANTICOAG MGMT PT WARFARIN: CPT | Performed by: INTERNAL MEDICINE

## 2025-01-23 NOTE — PATIENT INSTRUCTIONS
Reminder: Please contact the Coumadin Clinic at [phone]  when you have medication changes. Examples, new medications, antibiotics, discontinued medications, new supplements, missed doses of warfarin or if you took extra doses of warfarin.  This also includes OTC medications. Notifying us helps reduce the possibility of high and low INR's. In addition, if warfarin needs to be held for any procedures, please have surgeon or physician's office contact us before holding anticoagulant. Thanks, [facility] Cardiology Coumadin Clinic.

## 2025-01-27 ENCOUNTER — NURSE ONLY (OUTPATIENT)
Age: 89
End: 2025-01-27

## 2025-01-27 DIAGNOSIS — I49.5 TACHY-BRADY SYNDROME (HCC): Primary | ICD-10-CM

## 2025-02-05 ENCOUNTER — OFFICE VISIT (OUTPATIENT)
Age: 89
End: 2025-02-05
Payer: MEDICARE

## 2025-02-05 ENCOUNTER — ANTI-COAG VISIT (OUTPATIENT)
Age: 89
End: 2025-02-05
Payer: MEDICARE

## 2025-02-05 VITALS
DIASTOLIC BLOOD PRESSURE: 80 MMHG | HEIGHT: 71 IN | HEART RATE: 78 BPM | SYSTOLIC BLOOD PRESSURE: 132 MMHG | BODY MASS INDEX: 25.48 KG/M2 | WEIGHT: 182 LBS

## 2025-02-05 DIAGNOSIS — I25.10 CORONARY ARTERY DISEASE INVOLVING NATIVE CORONARY ARTERY OF NATIVE HEART WITHOUT ANGINA PECTORIS: ICD-10-CM

## 2025-02-05 DIAGNOSIS — I48.21 PERMANENT ATRIAL FIBRILLATION (HCC): Primary | ICD-10-CM

## 2025-02-05 DIAGNOSIS — I35.0 NONRHEUMATIC AORTIC VALVE STENOSIS: ICD-10-CM

## 2025-02-05 DIAGNOSIS — I10 ESSENTIAL HYPERTENSION: ICD-10-CM

## 2025-02-05 DIAGNOSIS — Z79.01 LONG TERM CURRENT USE OF ANTICOAGULANT: ICD-10-CM

## 2025-02-05 DIAGNOSIS — Z95.0 CARDIAC PACEMAKER: ICD-10-CM

## 2025-02-05 LAB
POC INR: 1.7
PROTHROMBIN TIME, POC: NORMAL

## 2025-02-05 PROCEDURE — 1123F ACP DISCUSS/DSCN MKR DOCD: CPT | Performed by: INTERNAL MEDICINE

## 2025-02-05 PROCEDURE — 1036F TOBACCO NON-USER: CPT | Performed by: INTERNAL MEDICINE

## 2025-02-05 PROCEDURE — 99214 OFFICE O/P EST MOD 30 MIN: CPT | Performed by: INTERNAL MEDICINE

## 2025-02-05 PROCEDURE — G8427 DOCREV CUR MEDS BY ELIG CLIN: HCPCS | Performed by: INTERNAL MEDICINE

## 2025-02-05 PROCEDURE — 1126F AMNT PAIN NOTED NONE PRSNT: CPT | Performed by: INTERNAL MEDICINE

## 2025-02-05 PROCEDURE — 1159F MED LIST DOCD IN RCRD: CPT | Performed by: INTERNAL MEDICINE

## 2025-02-05 PROCEDURE — 1160F RVW MEDS BY RX/DR IN RCRD: CPT | Performed by: INTERNAL MEDICINE

## 2025-02-05 PROCEDURE — 85610 PROTHROMBIN TIME: CPT | Performed by: INTERNAL MEDICINE

## 2025-02-05 PROCEDURE — G8417 CALC BMI ABV UP PARAM F/U: HCPCS | Performed by: INTERNAL MEDICINE

## 2025-02-05 ASSESSMENT — ENCOUNTER SYMPTOMS
ABDOMINAL PAIN: 0
HEMATOCHEZIA: 0
BOWEL INCONTINENCE: 0
HEMATEMESIS: 0
DIARRHEA: 0
SPUTUM PRODUCTION: 0
BLURRED VISION: 0
SHORTNESS OF BREATH: 0
COLOR CHANGE: 0
WHEEZING: 0
ORTHOPNEA: 0
HOARSE VOICE: 0

## 2025-02-05 NOTE — PATIENT INSTRUCTIONS
Reminder: Please contact the Coumadin Clinic at [phone]  when you have medication changes. Examples, new medications, antibiotics, discontinued medications, new supplements, missed doses of warfarin or if you took extra doses of warfarin.  This also includes OTC medications. Notifying us helps reduce the possibility of high and low INR's. In addition, if warfarin needs to be held for any procedures, please have surgeon or physician's office contact us before holding anticoagulant. Thanks, [facility] Cardiology Coumadin Clinic.

## 2025-02-05 NOTE — PROGRESS NOTES
[facility] CARDIOLOGY  [address] [place] [address], [address]  [place] [address]  PHONE: [phone]        25        NAME:  Dilip Babin  : 1935  MRN: [id number]       SUBJECTIVE:   Dilip Babin is a 89 y.o. male seen for a follow up visit regarding the following: CAD,permanent AF,pacemaker,and primary hypertension.On his last visit,I referred him to Dr Dubon to consider a Watchman implant due to falls while on Warfarin.He underwent pulse generator exchange in 2025.He returns for follow up.He reports doing well.He decided to defer Watchman implant consideration.    Chief Complaint   Patient presents with    Atrial Fibrillation       HPI:    Atrial Fibrillation  Presents for follow-up visit. Symptoms are negative for an AICD problem, bradycardia, chest pain, dizziness, hemodynamic instability, hypertension, hypotension, pacemaker problem, palpitations, shortness of breath, syncope, tachycardia and weakness. The symptoms have been stable.       Past Medical History, Past Surgical History, Family history, Social History, and Medications were all reviewed with the patient today and updated as necessary.         Current Outpatient Medications:     finasteride (PROSCAR) 5 MG tablet, Take 1 tablet by mouth daily, Disp: 90 tablet, Rfl: 3    tamsulosin (FLOMAX) 0.4 MG capsule, Take 1 capsule by mouth every evening, Disp: 90 capsule, Rfl: 3    levothyroxine (SYNTHROID) 137 MCG tablet, Take 1 tablet by mouth every morning (before breakfast), Disp: 90 tablet, Rfl: 3    metoprolol succinate (TOPROL XL) 25 MG extended release tablet, Take 1 tablet by mouth daily, Disp: 90 tablet, Rfl: 3    lisinopril (PRINIVIL;ZESTRIL) 40 MG tablet, Take 1 tablet by mouth 2 times daily, Disp: 180 tablet, Rfl: 3    hydrALAZINE (APRESOLINE) 25 MG tablet, Take 1 tablet by mouth 2 times daily (Patient taking differently: Take 1 tablet by mouth as needed (AS DIRECTED)), Disp: 180 tablet, Rfl: 10    furosemide (LASIX) 40

## 2025-02-06 ENCOUNTER — TELEPHONE (OUTPATIENT)
Dept: UROLOGY | Age: 89
End: 2025-02-06

## 2025-02-06 NOTE — TELEPHONE ENCOUNTER
Wife LM stating [facility] has been contacting them regarding Flomax refill. Stating they cannot refill it unless we contact them but refills were sent in December. She will contact [facility].

## 2025-02-11 NOTE — PROGRESS NOTES
facility-administered medications for this visit.       Over 50% of today's office visit was spent in face to face time reviewing test results, prognosis, importance of compliance, education about disease process, benefits of medications, instructions for management of acute symptoms, and follow up plans.     Electronically signed. Dictated using voice recognition software.  Proof read but unrecognized errors may exist.

## 2025-02-12 ENCOUNTER — OFFICE VISIT (OUTPATIENT)
Dept: PULMONOLOGY | Age: 89
End: 2025-02-12

## 2025-02-12 VITALS
BODY MASS INDEX: 25.73 KG/M2 | DIASTOLIC BLOOD PRESSURE: 74 MMHG | TEMPERATURE: 97 F | RESPIRATION RATE: 17 BRPM | HEART RATE: 68 BPM | HEIGHT: 71 IN | WEIGHT: 183.8 LBS | OXYGEN SATURATION: 99 % | SYSTOLIC BLOOD PRESSURE: 138 MMHG

## 2025-02-12 DIAGNOSIS — J43.9 PULMONARY EMPHYSEMA, UNSPECIFIED EMPHYSEMA TYPE (HCC): Chronic | ICD-10-CM

## 2025-02-12 DIAGNOSIS — I35.0 NONRHEUMATIC AORTIC VALVE STENOSIS: Chronic | ICD-10-CM

## 2025-02-12 DIAGNOSIS — J45.20 MILD INTERMITTENT ASTHMA, UNCOMPLICATED: Chronic | ICD-10-CM

## 2025-02-12 DIAGNOSIS — I27.20 PULMONARY HTN (HCC): ICD-10-CM

## 2025-02-12 DIAGNOSIS — G47.33 OBSTRUCTIVE SLEEP APNEA SYNDROME: Chronic | ICD-10-CM

## 2025-02-12 DIAGNOSIS — R06.02 SHORTNESS OF BREATH: Primary | Chronic | ICD-10-CM

## 2025-02-12 DIAGNOSIS — R91.1 SOLITARY PULMONARY NODULE: Chronic | ICD-10-CM

## 2025-02-12 DIAGNOSIS — I48.21 PERMANENT ATRIAL FIBRILLATION (HCC): Chronic | ICD-10-CM

## 2025-02-12 LAB
EXPIRATORY TIME: NORMAL
FEF 25-75% %PRED-PRE: NORMAL
FEF 25-75% PRED: NORMAL
FEF 25-75-PRE: NORMAL
FEV1 %PRED-PRE: 81 %
FEV1 PRED: 2.69 L
FEV1/FVC %PRED-PRE: NORMAL
FEV1/FVC PRED: 106 %
FEV1/FVC: 78 %
FEV1: 2.19 L
FVC %PRED-PRE: 75 %
FVC PRED: 3.72 L
FVC: 2.8 L
PEF %PRED-PRE: NORMAL
PEF PRED: NORMAL
PEF-PRE: NORMAL

## 2025-02-12 RX ORDER — ALBUTEROL SULFATE 90 UG/1
INHALANT RESPIRATORY (INHALATION)
Qty: 18 G | Refills: 11 | Status: SHIPPED | OUTPATIENT
Start: 2025-02-12

## 2025-02-12 ASSESSMENT — PULMONARY FUNCTION TESTS
FEV1: 2.19
FEV1/FVC: 78
FEV1_PREDICTED: 2.69
FVC_PREDICTED: 3.72
FVC_PERCENT_PREDICTED_PRE: 75
FEV1_PERCENT_PREDICTED_PRE: 81
FVC: 2.80
FEV1/FVC_PREDICTED: 106

## 2025-02-12 NOTE — PATIENT INSTRUCTIONS
Albuterol inhaler 2 puffs 4 times daily if needed for shortness of breath or wheezing.  May use prior to exertional activity.    Encouraged to resume CPAP therapy as prescribed.    Commended in exercise, encouraged to continue exercise as tolerated.    Follow-up in 6 months, sooner if needed.    Discussed immunizations per CDC guidelines-recommend RSV vaccine, Prevnar 20 pneumonia vaccine, newest COVID booster.  He is up-to-date on flu vaccine.

## 2025-02-13 ASSESSMENT — ENCOUNTER SYMPTOMS
HEMOPTYSIS: 0
SPUTUM PRODUCTION: 0
COUGH: 0
WHEEZING: 0
SHORTNESS OF BREATH: 1

## 2025-02-17 ENCOUNTER — ANTI-COAG VISIT (OUTPATIENT)
Age: 89
End: 2025-02-17
Payer: MEDICARE

## 2025-02-17 DIAGNOSIS — Z79.01 LONG TERM CURRENT USE OF ANTICOAGULANT: ICD-10-CM

## 2025-02-17 DIAGNOSIS — I48.21 PERMANENT ATRIAL FIBRILLATION (HCC): Primary | ICD-10-CM

## 2025-02-17 LAB
POC INR: 1.9
PROTHROMBIN TIME, POC: NORMAL

## 2025-02-17 PROCEDURE — 93793 ANTICOAG MGMT PT WARFARIN: CPT | Performed by: INTERNAL MEDICINE

## 2025-02-17 PROCEDURE — 85610 PROTHROMBIN TIME: CPT | Performed by: INTERNAL MEDICINE

## 2025-02-17 NOTE — PATIENT INSTRUCTIONS
Reminder: Please contact the Coumadin Clinic at [phone]  when you have medication changes. Examples, new medications, antibiotics, discontinued medications, new supplements, missed doses of warfarin or if you took extra doses of warfarin.  This also includes OTC medications. Notifying us helps reduce the possibility of high and low INR's. In addition, if warfarin needs to be held for any procedures, please have surgeon or physician's office contact us before holding anticoagulant. Thanks, [facility] Cardiology Coumadin Clinic.

## 2025-03-04 ENCOUNTER — ANTI-COAG VISIT (OUTPATIENT)
Age: 89
End: 2025-03-04
Payer: MEDICARE

## 2025-03-04 DIAGNOSIS — I48.21 PERMANENT ATRIAL FIBRILLATION (HCC): Primary | ICD-10-CM

## 2025-03-04 DIAGNOSIS — Z79.01 LONG TERM CURRENT USE OF ANTICOAGULANT: ICD-10-CM

## 2025-03-04 LAB
POC INR: 2.2
PROTHROMBIN TIME, POC: NORMAL

## 2025-03-04 PROCEDURE — 93793 ANTICOAG MGMT PT WARFARIN: CPT | Performed by: INTERNAL MEDICINE

## 2025-03-04 PROCEDURE — 85610 PROTHROMBIN TIME: CPT | Performed by: INTERNAL MEDICINE

## 2025-03-04 NOTE — PATIENT INSTRUCTIONS
Reminder: Please contact the Coumadin Clinic at [phone]  when you have medication changes. Examples, new medications, antibiotics, discontinued medications, new supplements, missed doses of warfarin or if you took extra doses of warfarin.  This also includes OTC medications. Notifying us helps reduce the possibility of high and low INR's. In addition, if warfarin needs to be held for any procedures, please have surgeon or physician's office contact us before holding anticoagulant. Thanks, [facility] Cardiology Coumadin Clinic.

## 2025-03-04 NOTE — PROGRESS NOTES
Pt states he has been taking 1 tablet every day. Warfarin tablet strength and weekly dosing schedule confirmed today.Therapeutic INR, patient to continue current maintenance plan (see Anticoag Dosing Calendar). INR to be rechecked in 2 week(s).   Anticoagulation Episode Summary       Current INR goal:  2.0-3.0   TTR:  55.8% (2.8 y)   Next INR check:  3/18/2025   INR from last check:  2.2 (3/4/2025)   Weekly max warfarin dose:  --   Target end date:  --   INR check location:  Anticoagulation Clinic   Preferred lab:  --   Send INR reminders to:  [facility] CARDIOLOGY [place] [facility]    Indications    Permanent atrial fibrillation (HCC) [I48.21]  Long term current use of anticoagulant [Z79.01]             Comments:  --             Anticoagulation Care Providers       Provider Role Specialty Phone number    Trevor Oden MD [facility] Cardiovascular Disease [phone]

## 2025-03-18 ENCOUNTER — ANTI-COAG VISIT (OUTPATIENT)
Age: 89
End: 2025-03-18
Payer: MEDICARE

## 2025-03-18 DIAGNOSIS — I48.21 PERMANENT ATRIAL FIBRILLATION (HCC): Primary | ICD-10-CM

## 2025-03-18 DIAGNOSIS — Z79.01 LONG TERM CURRENT USE OF ANTICOAGULANT: ICD-10-CM

## 2025-03-18 LAB
POC INR: 2.2
PROTHROMBIN TIME, POC: NORMAL

## 2025-03-18 PROCEDURE — 85610 PROTHROMBIN TIME: CPT | Performed by: INTERNAL MEDICINE

## 2025-03-18 PROCEDURE — 93793 ANTICOAG MGMT PT WARFARIN: CPT | Performed by: INTERNAL MEDICINE

## 2025-03-18 NOTE — PATIENT INSTRUCTIONS
Reminder: Please contact the Coumadin Clinic at [phone]  when you have medication changes. Examples, new medications, antibiotics, discontinued medications, new supplements, missed doses of warfarin or if you took extra doses of warfarin.  This also includes OTC medications. Notifying us helps reduce the possibility of high and low INR's. In addition, if warfarin needs to be held for any procedures, please have surgeon or physician's office contact us before holding anticoagulant. Thanks, [facility] Cardiology Coumadin Clinic.

## 2025-03-28 RX ORDER — WARFARIN SODIUM 2.5 MG/1
TABLET ORAL
Qty: 135 TABLET | Refills: 3 | Status: SHIPPED | OUTPATIENT
Start: 2025-03-28

## 2025-03-28 NOTE — TELEPHONE ENCOUNTER
Requested Prescriptions     Pending Prescriptions Disp Refills    warfarin (COUMADIN) 2.5 MG tablet [Pharmacy Med Name: Warfarin Sodium Oral Tablet 2.5 MG] 135 tablet 3     Sig: TAKE 1 TO 1 AND 1/2 TABS BY MOUTH DAILY AS DIRECTED BY [facility] CARDIOLOGY     Verified rx in last OV date 3/18/25. Pharmacy confirmed. Erx as requested.

## 2025-04-15 ENCOUNTER — ANTI-COAG VISIT (OUTPATIENT)
Age: 89
End: 2025-04-15
Payer: MEDICARE

## 2025-04-15 DIAGNOSIS — I48.21 PERMANENT ATRIAL FIBRILLATION (HCC): Primary | ICD-10-CM

## 2025-04-15 DIAGNOSIS — Z79.01 LONG TERM CURRENT USE OF ANTICOAGULANT: ICD-10-CM

## 2025-04-15 LAB
POC INR: 1.5
PROTHROMBIN TIME, POC: NORMAL

## 2025-04-15 PROCEDURE — 93793 ANTICOAG MGMT PT WARFARIN: CPT | Performed by: INTERNAL MEDICINE

## 2025-04-15 PROCEDURE — 85610 PROTHROMBIN TIME: CPT | Performed by: INTERNAL MEDICINE

## 2025-04-15 NOTE — PATIENT INSTRUCTIONS
Reminder: Please contact the Coumadin Clinic at [phone]  when you have medication changes. Examples, new medications, antibiotics, discontinued medications, new supplements, missed doses of warfarin or if you took extra doses of warfarin.  This also includes OTC medications. Notifying us helps reduce the possibility of high and low INR's. In addition, if warfarin needs to be held for any procedures, please have surgeon or physician's office contact us before holding anticoagulant. Thanks, [facility] Cardiology Coumadin Clinic.

## 2025-04-16 DIAGNOSIS — E03.9 PRIMARY HYPOTHYROIDISM: ICD-10-CM

## 2025-04-16 DIAGNOSIS — Z85.850 HISTORY OF THYROID CANCER: ICD-10-CM

## 2025-04-16 LAB
T4 FREE SERPL-MCNC: 1.2 NG/DL (ref 0.9–1.7)
TSH, 3RD GENERATION: 9.84 UIU/ML (ref 0.27–4.2)

## 2025-04-18 LAB — THYROGLOB AB SERPL-ACNC: 172.9 IU/ML (ref 0–0.9)

## 2025-04-29 ENCOUNTER — ANTI-COAG VISIT (OUTPATIENT)
Age: 89
End: 2025-04-29
Payer: MEDICARE

## 2025-04-29 DIAGNOSIS — I48.21 PERMANENT ATRIAL FIBRILLATION (HCC): Primary | ICD-10-CM

## 2025-04-29 DIAGNOSIS — Z79.01 LONG TERM CURRENT USE OF ANTICOAGULANT: ICD-10-CM

## 2025-04-29 LAB
POC INR: 2.3
PROTHROMBIN TIME, POC: NORMAL

## 2025-04-29 PROCEDURE — 85610 PROTHROMBIN TIME: CPT | Performed by: INTERNAL MEDICINE

## 2025-04-29 PROCEDURE — 93793 ANTICOAG MGMT PT WARFARIN: CPT | Performed by: INTERNAL MEDICINE

## 2025-04-29 NOTE — PATIENT INSTRUCTIONS
Reminder: Please contact the Coumadin Clinic at [phone]  when you have medication changes. Examples, new medications, antibiotics, discontinued medications, new supplements, missed doses of warfarin or if you took extra doses of warfarin.  This also includes OTC medications. Notifying us helps reduce the possibility of high and low INR's. In addition, if warfarin needs to be held for any procedures, please have surgeon or physician's office contact us before holding anticoagulant. Thanks, [facility] Cardiology Coumadin Clinic.

## 2025-05-01 ENCOUNTER — OFFICE VISIT (OUTPATIENT)
Dept: ENDOCRINOLOGY | Age: 89
End: 2025-05-01
Payer: MEDICARE

## 2025-05-01 VITALS
HEIGHT: 71 IN | BODY MASS INDEX: 25.9 KG/M2 | WEIGHT: 185 LBS | DIASTOLIC BLOOD PRESSURE: 70 MMHG | HEART RATE: 56 BPM | OXYGEN SATURATION: 96 % | SYSTOLIC BLOOD PRESSURE: 120 MMHG

## 2025-05-01 DIAGNOSIS — E03.9 PRIMARY HYPOTHYROIDISM: ICD-10-CM

## 2025-05-01 DIAGNOSIS — I48.0 PAROXYSMAL ATRIAL FIBRILLATION (HCC): ICD-10-CM

## 2025-05-01 DIAGNOSIS — Z85.850 HISTORY OF THYROID CANCER: Primary | ICD-10-CM

## 2025-05-01 PROCEDURE — 1123F ACP DISCUSS/DSCN MKR DOCD: CPT | Performed by: INTERNAL MEDICINE

## 2025-05-01 PROCEDURE — 99214 OFFICE O/P EST MOD 30 MIN: CPT | Performed by: INTERNAL MEDICINE

## 2025-05-01 PROCEDURE — 1159F MED LIST DOCD IN RCRD: CPT | Performed by: INTERNAL MEDICINE

## 2025-05-01 PROCEDURE — G8417 CALC BMI ABV UP PARAM F/U: HCPCS | Performed by: INTERNAL MEDICINE

## 2025-05-01 PROCEDURE — G2211 COMPLEX E/M VISIT ADD ON: HCPCS | Performed by: INTERNAL MEDICINE

## 2025-05-01 PROCEDURE — G8427 DOCREV CUR MEDS BY ELIG CLIN: HCPCS | Performed by: INTERNAL MEDICINE

## 2025-05-01 PROCEDURE — 1036F TOBACCO NON-USER: CPT | Performed by: INTERNAL MEDICINE

## 2025-05-01 RX ORDER — LEVOTHYROXINE SODIUM 150 UG/1
150 TABLET ORAL
Qty: 90 TABLET | Refills: 3 | Status: SHIPPED | OUTPATIENT
Start: 2025-05-01

## 2025-05-01 ASSESSMENT — ENCOUNTER SYMPTOMS
VOICE CHANGE: 1
TROUBLE SWALLOWING: 0
DIARRHEA: 0
CONSTIPATION: 0

## 2025-05-01 NOTE — PROGRESS NOTES
VERNON Contreras MD, [facility] ENDOCRINOLOGY   AND   THYROID NODULE CLINIC            Reason for visit: Follow-up of thyroid cancer      ASSESSMENT AND PLAN:    1. 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 Babin had MAMIE low risk thyroid cancer.  As such, he is adequately treated with thyroidectomy without radioiodine therapy.  Thyroglobulin has been in the expected range for someone who has undergone thyroidectomy but not received radioiodine.  For unknown reasons, his two most recent thyroglobulin antibody titers were much higher.  I will follow thyroglobulin over time (every 6 months).  I will provide him with longitudinal care.  - Thyroglobulin Ab and Thyroglobulin, WINNIE or JG; Future    2. Primary hypothyroidism (preceding thyroidectomy)  He remains inadequately treated.  I will increase his levothyroxine dose as below.  He will recheck thyroid function in 2 months and again prior to the next appointment with me.  - levothyroxine (SYNTHROID) 150 MCG tablet; Take 1 tablet by mouth every morning (before breakfast)  Dispense: 90 tablet; Refill: 3  - TSH; Future  - T4, Free; Future  - TSH; Future  - T4, Free; Future    3. Paroxysmal atrial fibrillation (HCC)            Follow-up and Dispositions    Return in about 6 months (around 11/1/2025).               History of Present Illness:    THYROID CANCER  Dilip Babin is seen in the THYROID NODULE CLINIC for follow-up of thyroid cancer.     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 Jones at [place])     Surgical pathology: 1.4 cm follicular

## 2025-05-04 LAB
THYROGLOB AB SERPL-ACNC: 172.9 IU/ML (ref 0–0.9)
THYROGLOBULIN: 4.9 NG/ML

## 2025-05-05 ENCOUNTER — RESULTS FOLLOW-UP (OUTPATIENT)
Dept: ENDOCRINOLOGY | Age: 89
End: 2025-05-05

## 2025-05-05 PROCEDURE — 93294 REM INTERROG EVL PM/LDLS PM: CPT | Performed by: INTERNAL MEDICINE

## 2025-05-05 PROCEDURE — 93296 REM INTERROG EVL PM/IDS: CPT | Performed by: INTERNAL MEDICINE

## 2025-05-13 ENCOUNTER — ANTI-COAG VISIT (OUTPATIENT)
Age: 89
End: 2025-05-13
Payer: MEDICARE

## 2025-05-13 DIAGNOSIS — Z79.01 LONG TERM CURRENT USE OF ANTICOAGULANT: ICD-10-CM

## 2025-05-13 DIAGNOSIS — I48.21 PERMANENT ATRIAL FIBRILLATION (HCC): Primary | ICD-10-CM

## 2025-05-13 LAB
POC INR: 2.3
PROTHROMBIN TIME, POC: NORMAL

## 2025-05-13 PROCEDURE — 93793 ANTICOAG MGMT PT WARFARIN: CPT | Performed by: INTERNAL MEDICINE

## 2025-05-13 PROCEDURE — 85610 PROTHROMBIN TIME: CPT | Performed by: INTERNAL MEDICINE

## 2025-05-13 NOTE — PROGRESS NOTES
Pt states that he has been taking 1 tablet qd. Therapeutic INR, patient to continue current maintenance plan (see Anticoag Dosing Calendar). INR to be rechecked in 4 week(s).   Anticoagulation Episode Summary       Current INR goal:  2.0-3.0   TTR:  56.0% (2.9 y)   Next INR check:  6/10/2025   INR from last check:  2.3 (5/13/2025)   Weekly max warfarin dose:  --   Target end date:  --   INR check location:  Anticoagulation Clinic   Preferred lab:  --   Send INR reminders to:  \[facility]\"" CARDIOLOGY MELINDA PT    Indications    Permanent atrial fibrillation (HCC) [I48.21]  Long term current use of anticoagulant [Z79.01]             Comments:  --             Anticoagulation Care Providers       Provider Role Specialty Phone number    Trevor Oden MD [facility] Cardiovascular Disease [phone]

## 2025-05-13 NOTE — PATIENT INSTRUCTIONS
Reminder: Please contact the Coumadin Clinic at [phone]  when you have medication changes. Examples, new medications, antibiotics, discontinued medications, new supplements, missed doses of warfarin or if you took extra doses of warfarin.  This also includes OTC medications. Notifying us helps reduce the possibility of high and low INR's. In addition, if warfarin needs to be held for any procedures, please have surgeon or physician's office contact us before holding anticoagulant. Thanks, [facility] Cardiology Coumadin Clinic.

## 2025-05-22 ENCOUNTER — OFFICE VISIT (OUTPATIENT)
Dept: ORTHOPEDIC SURGERY | Age: 89
End: 2025-05-22
Payer: MEDICARE

## 2025-05-22 DIAGNOSIS — M47.816 LUMBAR SPONDYLOSIS: Primary | ICD-10-CM

## 2025-05-22 DIAGNOSIS — M54.6 THORACIC BACK PAIN, UNSPECIFIED BACK PAIN LATERALITY, UNSPECIFIED CHRONICITY: ICD-10-CM

## 2025-05-22 PROCEDURE — 99214 OFFICE O/P EST MOD 30 MIN: CPT | Performed by: PHYSICIAN ASSISTANT

## 2025-05-22 PROCEDURE — 1123F ACP DISCUSS/DSCN MKR DOCD: CPT | Performed by: PHYSICIAN ASSISTANT

## 2025-05-22 PROCEDURE — G8428 CUR MEDS NOT DOCUMENT: HCPCS | Performed by: PHYSICIAN ASSISTANT

## 2025-05-22 PROCEDURE — 1036F TOBACCO NON-USER: CPT | Performed by: PHYSICIAN ASSISTANT

## 2025-05-22 PROCEDURE — G8417 CALC BMI ABV UP PARAM F/U: HCPCS | Performed by: PHYSICIAN ASSISTANT

## 2025-05-22 RX ORDER — TIZANIDINE 2 MG/1
2-4 TABLET ORAL NIGHTLY PRN
Qty: 90 TABLET | Refills: 2 | Status: SHIPPED | OUTPATIENT
Start: 2025-05-22

## 2025-05-22 NOTE — PROGRESS NOTES
Name: Dilip Babin  YOB: 1935  Gender: male  MRN: [id number]         *ANNUAL VISIT *        CC:   Chief Complaint   Patient presents with    Follow-up     Yearly recheck         HPI:         History of Present Illness  The patient is a 89-year-old male, an established patient, who presents for his annual visit.    He reports intermittent back pain that has been persistent for several months. The pain intensifies upon standing or walking but subsides after a few minutes of sitting. He is not experiencing any radiating leg pain or numbness. His current medication regimen includes Tylenol for pain management and Coumadin as an anticoagulant. He is not diabetic.    Additionally, he experiences pain around his shoulder blade, which does not impede his ability to raise his arm. The pain is localized behind his right shoulder and does not radiate down his arm. It is exacerbated by standing and walking, particularly during household chores such as dishwashing. The pain intensifies to the point where he occasionally needs to sit down. He does not experience any tenderness upon palpation or discomfort when moving his arm across his chest.    PAST SURGICAL HISTORY:  He last underwent lumbar facet injections with Dr. Amador in 11/2023.          Past Medical History Includes:   Past Medical History:   Diagnosis Date    Arthritis     Atrial fibrillation (HCC)     Coronary artery disease     COVID-19     Follicular thyroid carcinoma (HCC)     Hypertension     Incarcerated ventral hernia 01/28/2016    s/p ventral hernia repair (no mesh); Dr. Vyas     Melanoma of back (HCC) 2010    Microscopic hematuria     Nonrheumatic aortic valve stenosis 08/02/2024    Obstructive sleep apnea on CPAP     Papillary thyroid carcinoma (HCC)     Peptic ulcer disease     Primary hypothyroidism     Reflux esophagitis     Transient ischemic attack (TIA) 10/31/2008   ,   Past Surgical History:   Procedure Laterality Date

## 2025-05-25 DIAGNOSIS — I10 ESSENTIAL HYPERTENSION: ICD-10-CM

## 2025-05-27 RX ORDER — LISINOPRIL 40 MG/1
40 TABLET ORAL 2 TIMES DAILY
Qty: 180 TABLET | Refills: 3 | Status: SHIPPED | OUTPATIENT
Start: 2025-05-27

## 2025-05-27 RX ORDER — FUROSEMIDE 40 MG/1
40 TABLET ORAL 2 TIMES DAILY
Qty: 180 TABLET | Refills: 3 | Status: SHIPPED | OUTPATIENT
Start: 2025-05-27

## 2025-06-10 ENCOUNTER — ANTI-COAG VISIT (OUTPATIENT)
Age: 89
End: 2025-06-10
Payer: MEDICARE

## 2025-06-10 DIAGNOSIS — I48.21 PERMANENT ATRIAL FIBRILLATION (HCC): Primary | ICD-10-CM

## 2025-06-10 DIAGNOSIS — Z79.01 LONG TERM CURRENT USE OF ANTICOAGULANT: ICD-10-CM

## 2025-06-10 LAB
POC INR: 2
PROTHROMBIN TIME, POC: NORMAL

## 2025-06-10 PROCEDURE — 85610 PROTHROMBIN TIME: CPT | Performed by: INTERNAL MEDICINE

## 2025-06-10 PROCEDURE — 93793 ANTICOAG MGMT PT WARFARIN: CPT | Performed by: INTERNAL MEDICINE

## 2025-06-10 NOTE — PATIENT INSTRUCTIONS
Reminder: Please contact the Coumadin Clinic at [phone]  when you have medication changes. Examples, new medications, antibiotics, discontinued medications, new supplements, missed doses of warfarin or if you took extra doses of warfarin.  This also includes OTC medications. Notifying us helps reduce the possibility of high and low INR's. In addition, if warfarin needs to be held for any procedures, please have surgeon or physician's office contact us before holding anticoagulant. Thanks, [facility] Cardiology Coumadin Clinic.

## 2025-06-10 NOTE — PROGRESS NOTES
Warfarin tablet strength and weekly dosing schedule confirmed today.Therapeutic INR, patient to continue current maintenance plan (see Anticoag Dosing Calendar). INR to be rechecked in 4 week(s).   Anticoagulation Episode Summary       Current INR goal:  2.0-3.0   TTR:  57.1% (3 y)   Next INR check:  7/8/2025   INR from last check:  2.0 (6/10/2025)   Weekly max warfarin dose:  --   Target end date:  --   INR check location:  Anticoagulation Clinic   Preferred lab:  --   Send INR reminders to:  [facility] CARDIOLOGY MELINDA PT    Indications    Permanent atrial fibrillation (HCC) [I48.21]  Long term current use of anticoagulant [Z79.01]             Comments:  --             Anticoagulation Care Providers       Provider Role Specialty Phone number    Trevor Oden MD Responsible Cardiovascular Disease [phone]

## 2025-06-25 DIAGNOSIS — I25.10 CORONARY ARTERY DISEASE INVOLVING NATIVE CORONARY ARTERY OF NATIVE HEART WITHOUT ANGINA PECTORIS: ICD-10-CM

## 2025-06-25 DIAGNOSIS — I10 ESSENTIAL HYPERTENSION: ICD-10-CM

## 2025-06-25 DIAGNOSIS — I48.21 PERMANENT ATRIAL FIBRILLATION (HCC): ICD-10-CM

## 2025-06-25 RX ORDER — METOPROLOL SUCCINATE 25 MG/1
25 TABLET, EXTENDED RELEASE ORAL DAILY
Qty: 90 TABLET | Refills: 0 | Status: SHIPPED | OUTPATIENT
Start: 2025-06-25

## 2025-06-25 NOTE — TELEPHONE ENCOUNTER
Verified rx in last OV date 8-2-24. Pharmacy confirmed. Erx as requested. Pt has appt with you 7-8-25 [place]

## 2025-07-08 ENCOUNTER — ANTI-COAG VISIT (OUTPATIENT)
Age: 89
End: 2025-07-08
Payer: MEDICARE

## 2025-07-08 DIAGNOSIS — I48.21 PERMANENT ATRIAL FIBRILLATION (HCC): Primary | ICD-10-CM

## 2025-07-08 DIAGNOSIS — E03.9 PRIMARY HYPOTHYROIDISM: ICD-10-CM

## 2025-07-08 DIAGNOSIS — Z79.01 LONG TERM CURRENT USE OF ANTICOAGULANT: ICD-10-CM

## 2025-07-08 LAB
POC INR: 2.1
PROTHROMBIN TIME, POC: NORMAL
T4 FREE SERPL-MCNC: 1.4 NG/DL (ref 0.9–1.7)
TSH, 3RD GENERATION: 3.67 UIU/ML (ref 0.27–4.2)

## 2025-07-08 PROCEDURE — 93793 ANTICOAG MGMT PT WARFARIN: CPT | Performed by: INTERNAL MEDICINE

## 2025-07-08 PROCEDURE — 85610 PROTHROMBIN TIME: CPT | Performed by: INTERNAL MEDICINE

## 2025-07-08 NOTE — PATIENT INSTRUCTIONS
Reminder: Please contact the Coumadin Clinic at [phone]  when you have medication changes. Examples, new medications, antibiotics, discontinued medications, new supplements, missed doses of warfarin or if you took extra doses of warfarin.  This also includes OTC medications. Notifying us helps reduce the possibility of high and low INR's. In addition, if warfarin needs to be held for any procedures, please have surgeon or physician's office contact us before holding anticoagulant. Thanks, [facility] Cardiology Coumadin Clinic.

## 2025-07-08 NOTE — PROGRESS NOTES
Anticoagulation Summary  As of 2025      INR goal:  2.0-3.0   TTR:  58.2% (3.1 y)   INR used for dosin.1 (2025)   Warfarin maintenance plan:  2.5 mg (2.5 mg x 1) every day   Weekly warfarin total:  17.5 mg   Plan last modified:  Harmony Conde MA (2025)   Next INR check:  2025   Target end date:  --    Indications    Permanent atrial fibrillation (HCC) [I48.21]  Long term current use of anticoagulant [Z79.01]                 Anticoagulation Episode Summary       INR check location:  Anticoagulation Clinic    Preferred lab:  --    Send INR reminders to:  [facility] CARDIOLOGY MELINDA PT    Comments:  --          Anticoagulation Care Providers       Provider Role Specialty Phone number    Trevor Oden MD [facility] Cardiovascular Disease [phone]

## 2025-08-05 PROCEDURE — 93296 REM INTERROG EVL PM/IDS: CPT | Performed by: INTERNAL MEDICINE

## 2025-08-05 PROCEDURE — 93294 REM INTERROG EVL PM/LDLS PM: CPT | Performed by: INTERNAL MEDICINE

## 2025-08-07 ENCOUNTER — OFFICE VISIT (OUTPATIENT)
Age: 89
End: 2025-08-07

## 2025-08-07 ENCOUNTER — ANTI-COAG VISIT (OUTPATIENT)
Age: 89
End: 2025-08-07
Payer: MEDICARE

## 2025-08-07 VITALS
WEIGHT: 182 LBS | SYSTOLIC BLOOD PRESSURE: 130 MMHG | DIASTOLIC BLOOD PRESSURE: 78 MMHG | BODY MASS INDEX: 25.48 KG/M2 | HEART RATE: 68 BPM | HEIGHT: 71 IN

## 2025-08-07 DIAGNOSIS — Z79.01 LONG TERM CURRENT USE OF ANTICOAGULANT: ICD-10-CM

## 2025-08-07 DIAGNOSIS — I25.10 CORONARY ARTERY DISEASE INVOLVING NATIVE CORONARY ARTERY OF NATIVE HEART WITHOUT ANGINA PECTORIS: ICD-10-CM

## 2025-08-07 DIAGNOSIS — Z95.0 CARDIAC PACEMAKER: ICD-10-CM

## 2025-08-07 DIAGNOSIS — I35.0 NONRHEUMATIC AORTIC VALVE STENOSIS: Primary | ICD-10-CM

## 2025-08-07 DIAGNOSIS — I48.21 PERMANENT ATRIAL FIBRILLATION (HCC): ICD-10-CM

## 2025-08-07 DIAGNOSIS — I48.21 PERMANENT ATRIAL FIBRILLATION (HCC): Primary | ICD-10-CM

## 2025-08-07 DIAGNOSIS — I10 ESSENTIAL HYPERTENSION: ICD-10-CM

## 2025-08-07 LAB
POC INR: 2.4
PROTHROMBIN TIME, POC: NORMAL

## 2025-08-07 PROCEDURE — 93793 ANTICOAG MGMT PT WARFARIN: CPT | Performed by: INTERNAL MEDICINE

## 2025-08-07 PROCEDURE — 85610 PROTHROMBIN TIME: CPT | Performed by: INTERNAL MEDICINE

## 2025-08-07 RX ORDER — HYDRALAZINE HYDROCHLORIDE 25 MG/1
25 TABLET, FILM COATED ORAL 2 TIMES DAILY
Qty: 180 TABLET | Refills: 3 | Status: SHIPPED | OUTPATIENT
Start: 2025-08-07

## 2025-08-07 RX ORDER — DILTIAZEM HYDROCHLORIDE 180 MG/1
180 CAPSULE, EXTENDED RELEASE ORAL 2 TIMES DAILY
Qty: 180 CAPSULE | Refills: 3 | Status: SHIPPED | OUTPATIENT
Start: 2025-08-07

## 2025-08-07 ASSESSMENT — ENCOUNTER SYMPTOMS
HEMATEMESIS: 0
ABDOMINAL PAIN: 0
DIARRHEA: 0
HEMATOCHEZIA: 0
SHORTNESS OF BREATH: 0
BOWEL INCONTINENCE: 0
BLURRED VISION: 0
WHEEZING: 0
COLOR CHANGE: 0
SPUTUM PRODUCTION: 0
ORTHOPNEA: 0
HOARSE VOICE: 0

## 2025-08-14 ENCOUNTER — OFFICE VISIT (OUTPATIENT)
Dept: PULMONOLOGY | Age: 89
End: 2025-08-14
Payer: MEDICARE

## 2025-08-14 VITALS
HEIGHT: 71 IN | RESPIRATION RATE: 18 BRPM | BODY MASS INDEX: 25.62 KG/M2 | TEMPERATURE: 97.6 F | DIASTOLIC BLOOD PRESSURE: 70 MMHG | WEIGHT: 183 LBS | SYSTOLIC BLOOD PRESSURE: 129 MMHG | HEART RATE: 60 BPM | OXYGEN SATURATION: 96 %

## 2025-08-14 DIAGNOSIS — I27.20 PULMONARY HTN (HCC): ICD-10-CM

## 2025-08-14 DIAGNOSIS — J45.20 MILD INTERMITTENT ASTHMA, UNCOMPLICATED: Primary | ICD-10-CM

## 2025-08-14 DIAGNOSIS — I35.0 NONRHEUMATIC AORTIC VALVE STENOSIS: ICD-10-CM

## 2025-08-14 DIAGNOSIS — R06.02 SHORTNESS OF BREATH: Chronic | ICD-10-CM

## 2025-08-14 DIAGNOSIS — I48.21 PERMANENT ATRIAL FIBRILLATION (HCC): ICD-10-CM

## 2025-08-14 DIAGNOSIS — J43.9 PULMONARY EMPHYSEMA, UNSPECIFIED EMPHYSEMA TYPE (HCC): ICD-10-CM

## 2025-08-14 DIAGNOSIS — G47.33 OBSTRUCTIVE SLEEP APNEA SYNDROME: ICD-10-CM

## 2025-08-14 PROCEDURE — 1160F RVW MEDS BY RX/DR IN RCRD: CPT | Performed by: NURSE PRACTITIONER

## 2025-08-14 PROCEDURE — 1159F MED LIST DOCD IN RCRD: CPT | Performed by: NURSE PRACTITIONER

## 2025-08-14 PROCEDURE — 99214 OFFICE O/P EST MOD 30 MIN: CPT | Performed by: NURSE PRACTITIONER

## 2025-08-14 PROCEDURE — G8427 DOCREV CUR MEDS BY ELIG CLIN: HCPCS | Performed by: NURSE PRACTITIONER

## 2025-08-14 PROCEDURE — 1123F ACP DISCUSS/DSCN MKR DOCD: CPT | Performed by: NURSE PRACTITIONER

## 2025-08-14 PROCEDURE — 1036F TOBACCO NON-USER: CPT | Performed by: NURSE PRACTITIONER

## 2025-08-14 PROCEDURE — 3023F SPIROM DOC REV: CPT | Performed by: NURSE PRACTITIONER

## 2025-08-14 PROCEDURE — G8417 CALC BMI ABV UP PARAM F/U: HCPCS | Performed by: NURSE PRACTITIONER

## 2025-08-14 RX ORDER — ALBUTEROL SULFATE 90 UG/1
INHALANT RESPIRATORY (INHALATION)
Qty: 18 G | Refills: 11 | Status: SHIPPED | OUTPATIENT
Start: 2025-08-14

## 2025-08-14 ASSESSMENT — ENCOUNTER SYMPTOMS
COUGH: 0
WHEEZING: 0
HEMOPTYSIS: 0
SPUTUM PRODUCTION: 0
SHORTNESS OF BREATH: 0

## 2025-09-04 ENCOUNTER — ANTI-COAG VISIT (OUTPATIENT)
Age: 89
End: 2025-09-04
Payer: MEDICARE

## 2025-09-04 DIAGNOSIS — Z79.01 LONG TERM CURRENT USE OF ANTICOAGULANT: ICD-10-CM

## 2025-09-04 DIAGNOSIS — I48.21 PERMANENT ATRIAL FIBRILLATION (HCC): Primary | ICD-10-CM

## 2025-09-04 LAB
POC INR: 2
PROTHROMBIN TIME, POC: NORMAL

## 2025-09-04 PROCEDURE — 85610 PROTHROMBIN TIME: CPT | Performed by: INTERNAL MEDICINE

## 2025-09-04 PROCEDURE — 93793 ANTICOAG MGMT PT WARFARIN: CPT | Performed by: INTERNAL MEDICINE

## (undated) DEVICE — SUTURE ABSORBABLE BRAIDED 2-0 CT-1 27 IN UD VICRYL J259H

## (undated) DEVICE — SUTURE VICRYL 3-0  CTD SH-1 J219H

## (undated) DEVICE — COTTON BALLS: Brand: DEROYAL

## (undated) DEVICE — DRESSING POSTOP AG PRISMASEAL 3.5X6IN

## (undated) DEVICE — PROBE 8225101 5PK STD PRASS FL TIP ROHS

## (undated) DEVICE — SPONGE DISSECT PNUT SM 3/8IN -- 5/PK

## (undated) DEVICE — BIPOLAR FORCEPS CORD: Brand: VALLEYLAB

## (undated) DEVICE — SHEAR HARMONIC FOCUS OEM 9CM --

## (undated) DEVICE — SOLUTION IV 1000ML 0.9% SOD CHL

## (undated) DEVICE — SUTURE PERMAHAND SZ 2-0 L18IN NONABSORBABLE BLK L26MM SH C012D

## (undated) DEVICE — GARMENT,MEDLINE,DVT,INT,CALF,MED, GEN2: Brand: MEDLINE

## (undated) DEVICE — SUTURE PERMAHAND SZ 2-0 L12X18IN NONABSORBABLE BLK SILK A185H

## (undated) DEVICE — BUTTON SWITCH PENCIL BLADE ELECTRODE, HOLSTER: Brand: EDGE

## (undated) DEVICE — SUTURE NONABSORBABLE MONOFILAMENT 5-0 PS-2 18 IN BLK ETHILON 1666H

## (undated) DEVICE — CONTAINER,SPECIMEN,O.R.STRL,4.5OZ: Brand: MEDLINE

## (undated) DEVICE — KIT PROCEDURE SURG HEAD AND NECK TOTE

## (undated) DEVICE — TUBING, SUCTION, 1/4" X 10', STRAIGHT: Brand: MEDLINE

## (undated) DEVICE — BLADE BEAV SPEAR TIP 45 DEG --

## (undated) DEVICE — TRAY PREP DRY W/ PREM GLV 2 APPL 6 SPNG 2 UNDPD 1 OVERWRAP

## (undated) DEVICE — 2000CC GUARDIAN II: Brand: GUARDIAN

## (undated) DEVICE — INSULATED BLADE ELECTRODE: Brand: EDGE

## (undated) DEVICE — Device

## (undated) DEVICE — REM POLYHESIVE ADULT PATIENT RETURN ELECTRODE: Brand: VALLEYLAB

## (undated) DEVICE — PLASMABLADE X PS210-030S-LIGHT 3.0SL: Brand: PLASMABLADE™ X

## (undated) DEVICE — EMG TUBE 8229708 NIM TRIVANTAGE 8.0MM ID: Brand: NIM TRIVANTAGE™

## (undated) DEVICE — SUT CHRMC 4-0 27IN RB1 BRN --

## (undated) DEVICE — DRAPE TWL SURG 16X26IN BLU ORB04] ALLCARE INC]